# Patient Record
Sex: FEMALE | Race: WHITE | Employment: OTHER | URBAN - METROPOLITAN AREA
[De-identification: names, ages, dates, MRNs, and addresses within clinical notes are randomized per-mention and may not be internally consistent; named-entity substitution may affect disease eponyms.]

---

## 2018-10-15 ENCOUNTER — HOSPITAL ENCOUNTER (EMERGENCY)
Age: 83
Discharge: HOME | End: 2018-10-15
Payer: MEDICARE

## 2018-10-15 VITALS
WEIGHT: 140 LBS | HEART RATE: 73 BPM | DIASTOLIC BLOOD PRESSURE: 78 MMHG | OXYGEN SATURATION: 95 % | BODY MASS INDEX: 26.89 KG/M2 | RESPIRATION RATE: 16 BRPM | SYSTOLIC BLOOD PRESSURE: 173 MMHG | TEMPERATURE: 98.3 F

## 2018-10-15 DIAGNOSIS — Z48.02 ENCOUNTER FOR STAPLE REMOVAL: Primary | ICD-10-CM

## 2018-10-15 PROCEDURE — 99212 OFFICE O/P EST SF 10 MIN: CPT

## 2018-10-15 RX ORDER — ASPIRIN 81 MG/1
81 TABLET, CHEWABLE ORAL DAILY
COMMUNITY

## 2018-10-15 ASSESSMENT — PAIN SCALES - GENERAL: PAINLEVEL_OUTOF10: 0

## 2018-10-15 ASSESSMENT — PAIN DESCRIPTION - DESCRIPTORS: DESCRIPTORS: SORE

## 2018-10-15 ASSESSMENT — PAIN DESCRIPTION - LOCATION: LOCATION: HEAD

## 2018-10-15 NOTE — ED PROVIDER NOTES
HPI: Obdulio Deleon is a 80 y.o. female with a past medical history of  has no past medical history on file. presenting for reevaluation of the wound as well as suture removal. The patient sustained a laceration 8  Days ago. The wound was closed with staples.  Patient states that the wound has been healing well without evidence of erythema, purulent drainage, or increased pain. The patient denies a sensation of foreign body. The patient denies any fevers.     ROS:   Unless otherwise stated in this report or unable to obtain because of the patient's clinical or mental status as evidenced by the medical record, this patients's positive and negative responses for Review of Systems, constitutional, psych, eyes, ENT, cardiovascular, respiratory, gastrointestinal, neurological, genitourinary, musculoskeletal, integument systems and systems related to the presenting problem are either stated in the preceding or were not pertinent or were negative for the symptoms and/or complaints related to the medical problem. --------------------------------------------- PAST HISTORY ---------------------------------------------  Past Medical History:  has no past medical history on file. Past Surgical History:  has a past surgical history that includes Hysterectomy and Cholecystectomy. Social History:  reports that she has never smoked. She has never used smokeless tobacco. She reports that she does not drink alcohol. Family History: family history is not on file. The patients home medications have been reviewed. Allergies: Patient has no known allergies. ------------------------- NURSING NOTES AND VITALS REVIEWED ---------------------------   The nursing notes within the ED encounter and vital signs as below have been reviewed by myself.   BP (!) 173/78   Pulse 73   Temp 98.3 °F (36.8 °C) (Oral)   Resp 16   Wt 140 lb (63.5 kg)   SpO2 95%   BMI 26.89 kg/m²   Oxygen Saturation Interpretation: Normal    The

## 2019-02-15 LAB — MAMMOGRAPHY, EXTERNAL: NORMAL

## 2019-02-23 ENCOUNTER — APPOINTMENT (OUTPATIENT)
Dept: GENERAL RADIOLOGY | Age: 84
End: 2019-02-23
Payer: MEDICARE

## 2019-02-23 ENCOUNTER — APPOINTMENT (OUTPATIENT)
Dept: CT IMAGING | Age: 84
End: 2019-02-23
Payer: MEDICARE

## 2019-02-23 ENCOUNTER — HOSPITAL ENCOUNTER (EMERGENCY)
Age: 84
Discharge: HOME | End: 2019-02-23
Payer: MEDICARE

## 2019-02-23 VITALS
SYSTOLIC BLOOD PRESSURE: 157 MMHG | OXYGEN SATURATION: 96 % | TEMPERATURE: 97.7 F | RESPIRATION RATE: 18 BRPM | HEART RATE: 84 BPM | DIASTOLIC BLOOD PRESSURE: 84 MMHG | HEIGHT: 61 IN | WEIGHT: 140 LBS | BODY MASS INDEX: 26.43 KG/M2

## 2019-02-23 DIAGNOSIS — M25.562 ACUTE PAIN OF BOTH KNEES: ICD-10-CM

## 2019-02-23 DIAGNOSIS — S60.221A CONTUSION OF RIGHT HAND, INITIAL ENCOUNTER: ICD-10-CM

## 2019-02-23 DIAGNOSIS — M25.571 ACUTE RIGHT ANKLE PAIN: ICD-10-CM

## 2019-02-23 DIAGNOSIS — S00.83XA FACIAL CONTUSION, INITIAL ENCOUNTER: ICD-10-CM

## 2019-02-23 DIAGNOSIS — M25.561 ACUTE PAIN OF BOTH KNEES: ICD-10-CM

## 2019-02-23 DIAGNOSIS — S09.90XA INJURY OF HEAD, INITIAL ENCOUNTER: Primary | ICD-10-CM

## 2019-02-23 PROCEDURE — 73560 X-RAY EXAM OF KNEE 1 OR 2: CPT

## 2019-02-23 PROCEDURE — 73130 X-RAY EXAM OF HAND: CPT

## 2019-02-23 PROCEDURE — 73610 X-RAY EXAM OF ANKLE: CPT

## 2019-02-23 PROCEDURE — 73630 X-RAY EXAM OF FOOT: CPT

## 2019-02-23 PROCEDURE — 70486 CT MAXILLOFACIAL W/O DYE: CPT

## 2019-02-23 PROCEDURE — 73110 X-RAY EXAM OF WRIST: CPT

## 2019-02-23 PROCEDURE — 99284 EMERGENCY DEPT VISIT MOD MDM: CPT

## 2019-02-23 PROCEDURE — 70450 CT HEAD/BRAIN W/O DYE: CPT

## 2019-02-23 PROCEDURE — 72125 CT NECK SPINE W/O DYE: CPT

## 2019-02-23 ASSESSMENT — PAIN DESCRIPTION - ORIENTATION: ORIENTATION: RIGHT;LEFT

## 2019-02-23 ASSESSMENT — PAIN DESCRIPTION - PAIN TYPE: TYPE: ACUTE PAIN

## 2019-02-23 ASSESSMENT — PAIN DESCRIPTION - LOCATION: LOCATION: KNEE;LEG;HAND

## 2019-02-23 ASSESSMENT — PAIN DESCRIPTION - DESCRIPTORS: DESCRIPTORS: SPASM

## 2019-02-23 ASSESSMENT — PAIN SCALES - GENERAL: PAINLEVEL_OUTOF10: 5

## 2020-03-09 ENCOUNTER — HOSPITAL ENCOUNTER (OUTPATIENT)
Dept: MAMMOGRAPHY | Age: 85
Discharge: HOME | End: 2020-03-11
Payer: MEDICARE

## 2020-03-09 PROCEDURE — 77067 SCR MAMMO BI INCL CAD: CPT

## 2021-01-27 ENCOUNTER — IMMUNIZATION (OUTPATIENT)
Dept: PRIMARY CARE CLINIC | Age: 86
End: 2021-01-27
Payer: MEDICARE

## 2021-01-27 PROCEDURE — 0011A COVID-19, MODERNA VACCINE 100MCG/0.5ML DOSE: CPT | Performed by: NURSE PRACTITIONER

## 2021-01-27 PROCEDURE — 91301 COVID-19, MODERNA VACCINE 100MCG/0.5ML DOSE: CPT | Performed by: NURSE PRACTITIONER

## 2021-02-24 ENCOUNTER — IMMUNIZATION (OUTPATIENT)
Dept: PRIMARY CARE CLINIC | Age: 86
End: 2021-02-24
Payer: MEDICARE

## 2021-02-24 PROCEDURE — 91301 COVID-19, MODERNA VACCINE 100MCG/0.5ML DOSE: CPT | Performed by: NURSE PRACTITIONER

## 2021-02-24 PROCEDURE — 0012A COVID-19, MODERNA VACCINE 100MCG/0.5ML DOSE: CPT | Performed by: NURSE PRACTITIONER

## 2021-06-26 ENCOUNTER — HOSPITAL ENCOUNTER (EMERGENCY)
Age: 86
Discharge: ANOTHER HOSPITAL | DRG: 853 | End: 2021-06-26
Payer: MEDICARE

## 2021-06-26 ENCOUNTER — APPOINTMENT (OUTPATIENT)
Dept: CT IMAGING | Age: 86
DRG: 853 | End: 2021-06-26
Payer: MEDICARE

## 2021-06-26 ENCOUNTER — HOSPITAL ENCOUNTER (INPATIENT)
Age: 86
LOS: 6 days | Discharge: NURSING, REHAB OR LONG-TERM CARE | DRG: 853 | End: 2021-07-02
Attending: EMERGENCY MEDICINE | Admitting: SURGERY
Payer: MEDICARE

## 2021-06-26 VITALS
HEIGHT: 61 IN | DIASTOLIC BLOOD PRESSURE: 84 MMHG | OXYGEN SATURATION: 99 % | SYSTOLIC BLOOD PRESSURE: 160 MMHG | BODY MASS INDEX: 25.68 KG/M2 | HEART RATE: 84 BPM | WEIGHT: 136 LBS | TEMPERATURE: 98 F | RESPIRATION RATE: 20 BRPM

## 2021-06-26 DIAGNOSIS — K55.9 SMALL BOWEL ISCHEMIA (HCC): Primary | ICD-10-CM

## 2021-06-26 DIAGNOSIS — R10.30 LOWER ABDOMINAL PAIN: Primary | ICD-10-CM

## 2021-06-26 LAB
ALBUMIN SERPL-MCNC: 4.8 G/DL (ref 3.5–5.2)
ALP BLD-CCNC: 112 U/L (ref 35–104)
ALT SERPL-CCNC: 17 U/L (ref 0–32)
AMORPHOUS: ABNORMAL
ANION GAP SERPL CALCULATED.3IONS-SCNC: 17 MMOL/L (ref 7–16)
AST SERPL-CCNC: 22 U/L (ref 0–31)
BACTERIA: ABNORMAL /HPF
BASOPHILS ABSOLUTE: 0.04 E9/L (ref 0–0.2)
BASOPHILS RELATIVE PERCENT: 0.4 % (ref 0–2)
BILIRUB SERPL-MCNC: 0.6 MG/DL (ref 0–1.2)
BILIRUBIN URINE: NEGATIVE
BLOOD, URINE: NEGATIVE
BUN BLDV-MCNC: 11 MG/DL (ref 6–23)
CALCIUM SERPL-MCNC: 10 MG/DL (ref 8.6–10.2)
CHLORIDE BLD-SCNC: 93 MMOL/L (ref 98–107)
CLARITY: ABNORMAL
CO2: 22 MMOL/L (ref 22–29)
COLOR: YELLOW
CREAT SERPL-MCNC: 0.5 MG/DL (ref 0.5–1)
EOSINOPHILS ABSOLUTE: 0.02 E9/L (ref 0.05–0.5)
EOSINOPHILS RELATIVE PERCENT: 0.2 % (ref 0–6)
GFR AFRICAN AMERICAN: >60
GFR NON-AFRICAN AMERICAN: >60 ML/MIN/1.73
GLUCOSE BLD-MCNC: 119 MG/DL (ref 74–99)
GLUCOSE URINE: NEGATIVE MG/DL
HCT VFR BLD CALC: 40.5 % (ref 34–48)
HEMOGLOBIN: 14 G/DL (ref 11.5–15.5)
IMMATURE GRANULOCYTES #: 0.05 E9/L
IMMATURE GRANULOCYTES %: 0.5 % (ref 0–5)
KETONES, URINE: 40 MG/DL
LACTIC ACID: 1.8 MMOL/L (ref 0.5–2.2)
LEUKOCYTE ESTERASE, URINE: ABNORMAL
LIPASE: 17 U/L (ref 13–60)
LYMPHOCYTES ABSOLUTE: 1.21 E9/L (ref 1.5–4)
LYMPHOCYTES RELATIVE PERCENT: 11.5 % (ref 20–42)
MCH RBC QN AUTO: 31.2 PG (ref 26–35)
MCHC RBC AUTO-ENTMCNC: 34.6 % (ref 32–34.5)
MCV RBC AUTO: 90.2 FL (ref 80–99.9)
MONOCYTES ABSOLUTE: 0.61 E9/L (ref 0.1–0.95)
MONOCYTES RELATIVE PERCENT: 5.8 % (ref 2–12)
NEUTROPHILS ABSOLUTE: 8.63 E9/L (ref 1.8–7.3)
NEUTROPHILS RELATIVE PERCENT: 81.6 % (ref 43–80)
NITRITE, URINE: NEGATIVE
PDW BLD-RTO: 11.9 FL (ref 11.5–15)
PH UA: 8.5 (ref 5–9)
PLATELET # BLD: 261 E9/L (ref 130–450)
PMV BLD AUTO: 9.9 FL (ref 7–12)
POTASSIUM SERPL-SCNC: 3.9 MMOL/L (ref 3.5–5)
PROTEIN UA: NEGATIVE MG/DL
RBC # BLD: 4.49 E12/L (ref 3.5–5.5)
RBC UA: ABNORMAL /HPF (ref 0–2)
SODIUM BLD-SCNC: 132 MMOL/L (ref 132–146)
SPECIFIC GRAVITY UA: 1.02 (ref 1–1.03)
TOTAL PROTEIN: 7.7 G/DL (ref 6.4–8.3)
UROBILINOGEN, URINE: 0.2 E.U./DL
WBC # BLD: 10.6 E9/L (ref 4.5–11.5)
WBC UA: ABNORMAL /HPF (ref 0–5)

## 2021-06-26 PROCEDURE — 81001 URINALYSIS AUTO W/SCOPE: CPT

## 2021-06-26 PROCEDURE — 93005 ELECTROCARDIOGRAM TRACING: CPT | Performed by: EMERGENCY MEDICINE

## 2021-06-26 PROCEDURE — 99283 EMERGENCY DEPT VISIT LOW MDM: CPT

## 2021-06-26 PROCEDURE — 96374 THER/PROPH/DIAG INJ IV PUSH: CPT

## 2021-06-26 PROCEDURE — 85610 PROTHROMBIN TIME: CPT

## 2021-06-26 PROCEDURE — 86901 BLOOD TYPING SEROLOGIC RH(D): CPT

## 2021-06-26 PROCEDURE — 6360000002 HC RX W HCPCS: Performed by: EMERGENCY MEDICINE

## 2021-06-26 PROCEDURE — 2580000003 HC RX 258: Performed by: EMERGENCY MEDICINE

## 2021-06-26 PROCEDURE — 96376 TX/PRO/DX INJ SAME DRUG ADON: CPT

## 2021-06-26 PROCEDURE — 83690 ASSAY OF LIPASE: CPT

## 2021-06-26 PROCEDURE — 86850 RBC ANTIBODY SCREEN: CPT

## 2021-06-26 PROCEDURE — 36415 COLL VENOUS BLD VENIPUNCTURE: CPT

## 2021-06-26 PROCEDURE — 85025 COMPLETE CBC W/AUTO DIFF WBC: CPT

## 2021-06-26 PROCEDURE — 85730 THROMBOPLASTIN TIME PARTIAL: CPT

## 2021-06-26 PROCEDURE — 96375 TX/PRO/DX INJ NEW DRUG ADDON: CPT

## 2021-06-26 PROCEDURE — 6360000004 HC RX CONTRAST MEDICATION: Performed by: RADIOLOGY

## 2021-06-26 PROCEDURE — 2000000000 HC ICU R&B

## 2021-06-26 PROCEDURE — 80053 COMPREHEN METABOLIC PANEL: CPT

## 2021-06-26 PROCEDURE — 86900 BLOOD TYPING SEROLOGIC ABO: CPT

## 2021-06-26 PROCEDURE — 99211 OFF/OP EST MAY X REQ PHY/QHP: CPT

## 2021-06-26 PROCEDURE — 74177 CT ABD & PELVIS W/CONTRAST: CPT

## 2021-06-26 PROCEDURE — 83605 ASSAY OF LACTIC ACID: CPT

## 2021-06-26 PROCEDURE — 2580000003 HC RX 258

## 2021-06-26 RX ORDER — 0.9 % SODIUM CHLORIDE 0.9 %
500 INTRAVENOUS SOLUTION INTRAVENOUS ONCE
Status: COMPLETED | OUTPATIENT
Start: 2021-06-26 | End: 2021-06-26

## 2021-06-26 RX ORDER — FENTANYL CITRATE 50 UG/ML
50 INJECTION, SOLUTION INTRAMUSCULAR; INTRAVENOUS ONCE
Status: COMPLETED | OUTPATIENT
Start: 2021-06-26 | End: 2021-06-26

## 2021-06-26 RX ORDER — SODIUM CHLORIDE 9 MG/ML
INJECTION, SOLUTION INTRAVENOUS
Status: COMPLETED
Start: 2021-06-26 | End: 2021-06-26

## 2021-06-26 RX ORDER — 0.9 % SODIUM CHLORIDE 0.9 %
1000 INTRAVENOUS SOLUTION INTRAVENOUS ONCE
Status: COMPLETED | OUTPATIENT
Start: 2021-06-26 | End: 2021-06-27

## 2021-06-26 RX ORDER — ONDANSETRON 2 MG/ML
4 INJECTION INTRAMUSCULAR; INTRAVENOUS ONCE
Status: COMPLETED | OUTPATIENT
Start: 2021-06-26 | End: 2021-06-26

## 2021-06-26 RX ORDER — PROCHLORPERAZINE EDISYLATE 5 MG/ML
10 INJECTION INTRAMUSCULAR; INTRAVENOUS ONCE
Status: COMPLETED | OUTPATIENT
Start: 2021-06-26 | End: 2021-06-26

## 2021-06-26 RX ADMIN — Medication 500 ML: at 22:15

## 2021-06-26 RX ADMIN — IOPAMIDOL 75 ML: 755 INJECTION, SOLUTION INTRAVENOUS at 21:13

## 2021-06-26 RX ADMIN — PROCHLORPERAZINE EDISYLATE 10 MG: 5 INJECTION INTRAMUSCULAR; INTRAVENOUS at 20:14

## 2021-06-26 RX ADMIN — CEFTRIAXONE SODIUM 1000 MG: 1 INJECTION, POWDER, FOR SOLUTION INTRAMUSCULAR; INTRAVENOUS at 23:03

## 2021-06-26 RX ADMIN — SODIUM CHLORIDE 500 ML: 9 INJECTION, SOLUTION INTRAVENOUS at 22:15

## 2021-06-26 RX ADMIN — FENTANYL CITRATE 50 MCG: 50 INJECTION, SOLUTION INTRAMUSCULAR; INTRAVENOUS at 18:45

## 2021-06-26 RX ADMIN — ONDANSETRON 4 MG: 2 INJECTION INTRAMUSCULAR; INTRAVENOUS at 18:46

## 2021-06-26 RX ADMIN — FENTANYL CITRATE 50 MCG: 50 INJECTION, SOLUTION INTRAMUSCULAR; INTRAVENOUS at 22:16

## 2021-06-26 RX ADMIN — SODIUM CHLORIDE 1000 ML: 9 INJECTION, SOLUTION INTRAVENOUS at 22:55

## 2021-06-26 RX ADMIN — SODIUM CHLORIDE 500 ML: 0.9 INJECTION, SOLUTION INTRAVENOUS at 18:48

## 2021-06-26 ASSESSMENT — PAIN DESCRIPTION - PAIN TYPE
TYPE: ACUTE PAIN

## 2021-06-26 ASSESSMENT — PAIN SCALES - GENERAL
PAINLEVEL_OUTOF10: 10
PAINLEVEL_OUTOF10: 10
PAINLEVEL_OUTOF10: 6
PAINLEVEL_OUTOF10: 5

## 2021-06-26 ASSESSMENT — ENCOUNTER SYMPTOMS
SHORTNESS OF BREATH: 0
COUGH: 0
BACK PAIN: 0
ABDOMINAL PAIN: 1

## 2021-06-26 ASSESSMENT — PAIN DESCRIPTION - PROGRESSION
CLINICAL_PROGRESSION: GRADUALLY WORSENING
CLINICAL_PROGRESSION: NOT CHANGED

## 2021-06-26 ASSESSMENT — PAIN DESCRIPTION - ONSET
ONSET: ON-GOING
ONSET: ON-GOING

## 2021-06-26 ASSESSMENT — PAIN DESCRIPTION - LOCATION
LOCATION: ABDOMEN;BACK
LOCATION: ABDOMEN;BACK
LOCATION: ABDOMEN

## 2021-06-26 ASSESSMENT — PAIN - FUNCTIONAL ASSESSMENT
PAIN_FUNCTIONAL_ASSESSMENT: PREVENTS OR INTERFERES SOME ACTIVE ACTIVITIES AND ADLS
PAIN_FUNCTIONAL_ASSESSMENT: ACTIVITIES ARE NOT PREVENTED
PAIN_FUNCTIONAL_ASSESSMENT: 0-10

## 2021-06-26 ASSESSMENT — PAIN DESCRIPTION - DESCRIPTORS: DESCRIPTORS: SHARP;SHOOTING

## 2021-06-26 ASSESSMENT — PAIN DESCRIPTION - ORIENTATION: ORIENTATION: LOWER

## 2021-06-26 ASSESSMENT — PAIN DESCRIPTION - FREQUENCY
FREQUENCY: INTERMITTENT
FREQUENCY: INTERMITTENT

## 2021-06-26 NOTE — ED NOTES
FIRST PROVIDER CONTACT ASSESSMENT NOTE      Department of Emergency Medicine   Admit Date: No admission date for patient encounter. Chief Complaint: Abdominal Pain (Sent here from  for Abdominal Pain, Back Pain and Bloating.)      History of Present Illness:   Negra Aguiar is a 80 y.o. female who presents to the ED for periumbililcal abdominal pain with bloating. Pain radiates to the back.          -----------------END OF FIRST PROVIDER CONTACT ASSESSMENT NOTE--------------  Electronically signed by FRITZ Montgomery   DD: 6/26/21               Kristen Tobias [place]  06/26/21 1805

## 2021-06-26 NOTE — ED PROVIDER NOTES
[facility] [facility]  Department of Emergency Medicine   ED  Encounter Note  Admit Date/RoomTime: 2021  4:53 PM  ED Room:     NAME: Ariadna Amaya  : 1933  MRN: [id number]     Chief Complaint:  Abdominal Pain and Back Pain    History of Present Illness       Ariadna Amaya is a 80 y.o. old female who presents to the emergency department with complaint of abdominal pain. Patient states that she started with a twinge of lower abdominal pain last night. Thought it was the beans and soup that she had eaten at [employer] earlier yesterday. And then today around 11:00 the pain came on even worse. She is having sharp pains across her lower abdomen. Pain radiates around on each side into her back. Does admit that she is very nauseated. Did have some dry heaving and regurgitation, but no outright vomiting. Patient states that she had a small hard bowel movement this morning. States that she only has small hard bowel movements every day. No diarrhea. Patient denies any urinary frequency or burning with urination. She rates the pain a 6 out of 10. Nothing seems to be making it any better. Patient has had a hysterectomy and cholecystectomy. ROS   Pertinent positives and negatives are stated within HPI, all other systems reviewed and are negative. Past Medical History:  has no past medical history on file. Surgical History has a past surgical history that includes Hysterectomy and Cholecystectomy. Social History:  reports that she has never smoked. She has never used smokeless tobacco. She reports that she does not drink alcohol and does not use drugs. Family History: family history is not on file. Allergies: Nut [peanut-containing drug products] and Statins    Physical Exam   Oxygen Saturation Interpretation: Normal on room air analysis.         ED Triage Vitals   BP Temp Temp Source Pulse Resp SpO2 Height Weight   21 1656 21 1655 21 1655 21 1655 06/26/21 1655 06/26/21 1655 06/26/21 1655 06/26/21 1655   (!) 160/84 98 °F (36.7 °C) Temporal 84 20 99 % 5' 0.5\" (1.537 m) 136 lb (61.7 kg)          General:  NAD. Alert and Oriented. Well-appearing. Skin:  Warm, dry. No rashes. Head:  Normocephalic. Atraumatic. Eyes:  EOMI. Conjunctiva normal.  ENT:  Oral mucosa moist.  Airway patent. Neck:  Supple. Normal ROM. Respiratory:  No respiratory distress. No labored breathing. Lungs clear without rales, rhonchi or wheezing. Cardiovascular:  Regular rate. No peripheral edema. Extremities warm and good color. Chest:  Abdomen: Abdomen is distended. Hypoactive bowel sounds. She is tender to palpation to the bilateral lower quadrants and midline lower abdomen. Patient is having waves of pain and she will actually grab her lower abdomen and bend over. Negative guarding. Negative rebound. Rectal:  Gu: Bladder nontender and non distended. No CVA tenderness. Pelvic:  Extremities:  Normal ROM. Nontender to palpation. Atraumatic. Back:  Normal ROM. Nontender to palpation. Neuro:  Alert and Oriented to person, place, time and situation. Normal LOC. Moves all extremities. Speech fluent. Psych:  Calm and Cooperative. Normal thought process. Normal judgement.         Lab / Imaging Results   (All laboratory and radiology results have been personally reviewed by myself)  Labs:  Results for orders placed or performed during the hospital encounter of 06/26/21   Urinalysis, reflex to microscopic   Result Value Ref Range    Color, UA Yellow Straw/Yellow    Clarity, UA CLOUDY (A) Clear    Glucose, Ur Negative Negative mg/dL    Bilirubin Urine Negative Negative    Ketones, Urine 40 (A) Negative mg/dL    Specific Gravity, UA 1.020 1.005 - 1.030    Blood, Urine Negative Negative    pH, UA 8.5 5.0 - 9.0    Protein, UA Negative Negative mg/dL    Urobilinogen, Urine 0.2 <2.0 E.U./dL    Nitrite, Urine Negative Negative    Leukocyte Esterase, Urine TRACE (A) Negative     Imaging: All Radiology results interpreted by Radiologist unless otherwise noted. No orders to display     ED Course / Medical Decision Making   Medications - No data to display       Re-Evaluations:  6/26/21      Time:     Patients condition . Consultations:             None    Procedures:   none    MDM: Abdominal pain and back pain in an 54-year-old female requires more of a work-up than I can do here in urgent care. Son is at bedside and he will drive her to the ER. Plan of Care/Counseling:  Physician Assistant on duty reviewed today's visit with the patient in addition to providing specific details for the plan of care and counseling regarding the diagnosis and prognosis. Questions are answered at this time and are agreeable with the plan. Assessment      1. Lower abdominal pain New Problem     This patient's ED course included: a personal history and physicial examination  This patient has remained hemodynamically stable during their ED course. Plan   Discharged to [facility] ER  Patient condition is fair. New Medications     New Prescriptions    No medications on file     Electronically signed by FRITZ Fabian   DD: 6/26/21  **This report was transcribed using voice recognition software. Every effort was made to ensure accuracy; however, inadvertent computerized transcription errors may be present.   END OF PROVIDER NOTE       Alcira Fabian  06/26/21 1710

## 2021-06-26 NOTE — ED PROVIDER NOTES
This is an 49-year-old female with no significant past medical history presents to the ED for evaluation of abdominal pain. Patient states that last night at around 11:00 she developed gradual onset abdominal pain and bloating. Patient states that this pain wraps around to her lower back. Patient states that throughout the day today it has been gotten worse. Patient describes the pain is severe in nature. Patient states he is unable to have a small bowel movement earlier today but feels quite nauseous and has vomited several times. Patient has no chest pain or shortness of breath. Patient denies any dysuria or hematuria. Patient denies any mitigating or exacerbating factors. The history is provided by the patient. No  was used. Review of Systems   Constitutional: Positive for appetite change. HENT: Negative for congestion. Eyes: Negative for visual disturbance. Respiratory: Negative for cough and shortness of breath. Cardiovascular: Negative for chest pain. Gastrointestinal: Positive for abdominal pain. Endocrine: Negative for polyuria. Genitourinary: Negative for dysuria. Musculoskeletal: Negative for back pain. Skin: Negative for rash. Neurological: Negative for headaches. Hematological: Does not bruise/bleed easily. Psychiatric/Behavioral: Negative for confusion. Physical Exam  Vitals and nursing note reviewed. Constitutional:       General: She is not in acute distress. Appearance: She is well-developed. HENT:      Head: Normocephalic and atraumatic. Neck:      Vascular: No JVD. Cardiovascular:      Rate and Rhythm: Normal rate and regular rhythm. Pulmonary:      Effort: Pulmonary effort is normal.   Abdominal:      General: There is no distension. Palpations: Abdomen is soft. Musculoskeletal:      Cervical back: Normal range of motion. Skin:     General: Skin is warm and dry.    Neurological:      Mental Status: She is alert and oriented to person, place, and time. Cranial Nerves: No cranial nerve deficit. Psychiatric:         Behavior: Behavior normal.          Procedures     MDM  Number of Diagnoses or Management Options  Small bowel ischemia [facility])  Diagnosis management comments: Patient presented to the ED for evaluation of abdominal pain going to her back. Patient has significant abdominal tenderness as well as some distention. Patient was hemodynamically stable while I was here in the department observing the patient. Patient was treated with multiple doses of analgesics IV fluids CT imaging was concerning for small bowel ischemia. Patient was treated with antibiotics and I probably called surgery discussed the case who agreed to admit the patient. Patient and family were updated multiple times were agreeable this plan. ED Course as of Jun 27 1744   Sat Jun 26, 2021 2322 Page out to Dr. Nico Sharif    [BP]   [phone] with Dr. Nico Sharif, asked to have an NG tube placed and would admit patient. Patient agreeable with plan. [BP]      ED Course User Index  [BP] Andria Malik DO           ED Course as of Jun 27 1744   Sat Jun 26, 2021 2322 Page out to Dr. Nico Sharif    [BP]   [phone] with Dr. Nico Sharif, asked to have an NG tube placed and would admit patient. Patient agreeable with plan. [BP]      ED Course User Index  [BP] Andria Malik DO       --------------------------------------------- PAST HISTORY ---------------------------------------------  Past Medical History:  has no past medical history on file. Past Surgical History:  has a past surgical history that includes Hysterectomy and Cholecystectomy. Social History:  reports that she has never smoked. She has never used smokeless tobacco. She reports that she does not drink alcohol and does not use drugs. Family History: family history is not on file. The patients home medications have been reviewed.     Allergies: Nut [peanut-containing drug products] and Statins    -------------------------------------------------- RESULTS -------------------------------------------------    LABS:  Results for orders placed or performed during the hospital encounter of 06/26/21   CBC auto differential   Result Value Ref Range    WBC 10.6 4.5 - 11.5 E9/L    RBC 4.49 3.50 - 5.50 E12/L    Hemoglobin 14.0 11.5 - 15.5 g/dL    Hematocrit 40.5 34.0 - 48.0 %    MCV 90.2 80.0 - 99.9 fL    MCH 31.2 26.0 - 35.0 pg    MCHC 34.6 (H) 32.0 - 34.5 %    RDW 11.9 11.5 - 15.0 fL    Platelets 433 506 - 117 E9/L    MPV 9.9 7.0 - 12.0 fL    Neutrophils % 81.6 (H) 43.0 - 80.0 %    Immature Granulocytes % 0.5 0.0 - 5.0 %    Lymphocytes % 11.5 (L) 20.0 - 42.0 %    Monocytes % 5.8 2.0 - 12.0 %    Eosinophils % 0.2 0.0 - 6.0 %    Basophils % 0.4 0.0 - 2.0 %    Neutrophils Absolute 8.63 (H) 1.80 - 7.30 E9/L    Immature Granulocytes # 0.05 E9/L    Lymphocytes Absolute 1.21 (L) 1.50 - 4.00 E9/L    Monocytes Absolute 0.61 0.10 - 0.95 E9/L    Eosinophils Absolute 0.02 (L) 0.05 - 0.50 E9/L    Basophils Absolute 0.04 0.00 - 0.20 E9/L   COMPREHENSIVE METABOLIC PANEL   Result Value Ref Range    Sodium 132 132 - 146 mmol/L    Potassium 3.9 3.5 - 5.0 mmol/L    Chloride 93 (L) 98 - 107 mmol/L    CO2 22 22 - 29 mmol/L    Anion Gap 17 (H) 7 - 16 mmol/L    Glucose 119 (H) 74 - 99 mg/dL    BUN 11 6 - 23 mg/dL    CREATININE 0.5 0.5 - 1.0 mg/dL    GFR Non-African American >60 >=60 mL/min/1.73    GFR African American >60     Calcium 10.0 8.6 - 10.2 mg/dL    Total Protein 7.7 6.4 - 8.3 g/dL    Albumin 4.8 3.5 - 5.2 g/dL    Total Bilirubin 0.6 0.0 - 1.2 mg/dL    Alkaline Phosphatase 112 (H) 35 - 104 U/L    ALT 17 0 - 32 U/L    AST 22 0 - 31 U/L   Lipase   Result Value Ref Range    Lipase 17 13 - 60 U/L   LACTIC ACID, PLASMA   Result Value Ref Range    Lactic Acid 1.8 0.5 - 2.2 mmol/L   APTT   Result Value Ref Range    aPTT 20.6 (L) 24.5 - 35.1 sec   Protime-INR   Result Value Ref Range Protime 11.9 9.3 - 12.4 sec    INR 1.0    Lactic acid, plasma   Result Value Ref Range    Lactic Acid 7.2 (HH) 0.5 - 2.2 mmol/L   Basic Metabolic Panel w/ Reflex to MG   Result Value Ref Range    Sodium 132 132 - 146 mmol/L    Potassium reflex Magnesium 3.9 3.5 - 5.0 mmol/L    Chloride 100 98 - 107 mmol/L    CO2 15 (L) 22 - 29 mmol/L    Anion Gap 17 (H) 7 - 16 mmol/L    Glucose 249 (H) 74 - 99 mg/dL    BUN 20 6 - 23 mg/dL    CREATININE 1.2 (H) 0.5 - 1.0 mg/dL    GFR Non-African American 42 >=60 mL/min/1.73    GFR African American 51     Calcium 9.1 8.6 - 10.2 mg/dL   CBC auto differential   Result Value Ref Range    WBC 18.5 (H) 4.5 - 11.5 E9/L    RBC 5.77 (H) 3.50 - 5.50 E12/L    Hemoglobin 17.9 (H) 11.5 - 15.5 g/dL    Hematocrit 55.6 (H) 34.0 - 48.0 %    MCV 96.4 80.0 - 99.9 fL    MCH 31.0 26.0 - 35.0 pg    MCHC 32.2 32.0 - 34.5 %    RDW 12.6 11.5 - 15.0 fL    Platelets 882 324 - 709 E9/L    MPV 10.2 7.0 - 12.0 fL    Neutrophils % 87.4 (H) 43.0 - 80.0 %    Immature Granulocytes % 0.7 0.0 - 5.0 %    Lymphocytes % 5.1 (L) 20.0 - 42.0 %    Monocytes % 6.6 2.0 - 12.0 %    Eosinophils % 0.0 0.0 - 6.0 %    Basophils % 0.2 0.0 - 2.0 %    Neutrophils Absolute 16.14 (H) 1.80 - 7.30 E9/L    Immature Granulocytes # 0.12 E9/L    Lymphocytes Absolute 0.95 (L) 1.50 - 4.00 E9/L    Monocytes Absolute 1.22 (H) 0.10 - 0.95 E9/L    Eosinophils Absolute 0.00 (L) 0.05 - 0.50 E9/L    Basophils Absolute 0.03 0.00 - 0.20 E9/L   CBC WITH AUTO DIFFERENTIAL   Result Value Ref Range    WBC 15.1 (H) 4.5 - 11.5 E9/L    RBC 4.73 3.50 - 5.50 E12/L    Hemoglobin 15.0 11.5 - 15.5 g/dL    Hematocrit 43.4 34.0 - 48.0 %    MCV 91.8 80.0 - 99.9 fL    MCH 31.7 26.0 - 35.0 pg    MCHC 34.6 (H) 32.0 - 34.5 %    RDW 12.5 11.5 - 15.0 fL    Platelets 586 499 - 632 E9/L    MPV 10.2 7.0 - 12.0 fL    Neutrophils % 86.6 (H) 43.0 - 80.0 %    Immature Granulocytes % 0.5 0.0 - 5.0 %    Lymphocytes % 6.3 (L) 20.0 - 42.0 %    Monocytes % 6.5 2.0 - 12.0 % Eosinophils % 0.0 0.0 - 6.0 %    Basophils % 0.1 0.0 - 2.0 %    Neutrophils Absolute 13.03 (H) 1.80 - 7.30 E9/L    Immature Granulocytes # 0.07 E9/L    Lymphocytes Absolute 0.95 (L) 1.50 - 4.00 E9/L    Monocytes Absolute 0.98 (H) 0.10 - 0.95 E9/L    Eosinophils Absolute 0.00 (L) 0.05 - 0.50 E9/L    Basophils Absolute 0.02 0.00 - 0.20 E9/L   Lactic acid, plasma   Result Value Ref Range    Lactic Acid 3.4 (H) 0.5 - 2.2 mmol/L   Comprehensive Metabolic Panel w/ Reflex to MG   Result Value Ref Range    Sodium 133 132 - 146 mmol/L    Potassium reflex Magnesium 3.9 3.5 - 5.0 mmol/L    Chloride 105 98 - 107 mmol/L    CO2 16 (L) 22 - 29 mmol/L    Anion Gap 12 7 - 16 mmol/L    Glucose 170 (H) 74 - 99 mg/dL    BUN 22 6 - 23 mg/dL    CREATININE 0.9 0.5 - 1.0 mg/dL    GFR Non-African American 59 >=60 mL/min/1.73    GFR African American >60     Calcium 8.3 (L) 8.6 - 10.2 mg/dL    Total Protein 4.0 (L) 6.4 - 8.3 g/dL    Albumin 2.5 (L) 3.5 - 5.2 g/dL    Total Bilirubin 0.3 0.0 - 1.2 mg/dL    Alkaline Phosphatase 74 35 - 104 U/L    ALT 10 0 - 32 U/L    AST 14 0 - 31 U/L   Lactic acid, plasma   Result Value Ref Range    Lactic Acid 2.1 0.5 - 2.2 mmol/L   EKG 12 Lead   Result Value Ref Range    Ventricular Rate 97 BPM    Atrial Rate 97 BPM    P-R Interval 128 ms    QRS Duration 82 ms    Q-T Interval 364 ms    QTc Calculation (Bazett) 462 ms    P Axis 55 degrees    R Axis -27 degrees    T Axis 44 degrees   TYPE AND SCREEN   Result Value Ref Range    ABO/Rh A POS     Antibody Screen NEG        RADIOLOGY:  XR CHEST PORTABLE   Final Result   Endotracheal tube in good position. Left subclavian central venous catheter tip in the SVC. NG tube in the stomach. No pneumothorax on the right on the left. No acute cardiopulmonary process. XR ABDOMEN FOR NG/OG/NE TUBE PLACEMENT   Final Result   1. Satisfactory position of the NG tube within stomach.          CT ABDOMEN PELVIS W IV CONTRAST Additional Contrast? None Final Result   Findings for segmental small bowel ischemia of entrapped mid jejunal segment   likely due internal herniation/adhesions, forming a closed loop   syndrome-likely with vascular compromise including severe mesenteric edema   and thickening of bowel wall. Preliminary report given to Dr. Keyanna Vicente physician in [facility] at the time of the interpretation.                 ------------------------- NURSING NOTES [address] ---------------------------  Date / Time Roomed:  6/26/2021  6:05 PM  ED Bed Assignment:  [id number]    The nursing notes within the ED encounter and vital signs as below have been reviewed.      Patient Vitals for the past 24 hrs:   BP Temp Temp src Pulse Resp SpO2   06/27/21 1600 110/66 98.3 °F (36.8 °C) Axillary 101 17 94 %   06/27/21 1500 110/61 -- -- 97 24 97 %   06/27/21 1350 -- 98.2 °F (36.8 °C) Axillary 94 18 100 %   06/27/21 1328 114/68 -- -- 93 20 93 %   06/27/21 1318 (!) 113/58 -- -- 81 17 100 %   06/27/21 1308 103/71 -- -- 87 15 100 %   06/27/21 1259 (!) 115/56 -- -- 86 22 100 %   06/27/21 1248 102/73 -- -- 87 19 100 %   06/27/21 1238 (!) 100/57 -- -- 80 15 100 %   06/27/21 1228 97/71 -- -- 85 12 100 %   06/27/21 1225 -- -- -- -- 22 --   06/27/21 1224 -- -- -- -- 19 --   06/27/21 1223 -- -- -- -- 21 --   06/27/21 1222 -- -- -- -- 22 --   06/27/21 1221 -- -- -- -- 16 --   06/27/21 1220 -- -- -- -- 17 --   06/27/21 1219 -- -- -- -- 19 --   06/27/21 1218 100/70 -- -- 84 16 100 %   06/27/21 1208 (!) 109/51 -- -- 78 14 100 %   06/27/21 1204 -- -- -- 87 15 100 %   06/27/21 1203 -- -- -- 87 15 100 %   06/27/21 1202 -- -- -- 81 17 100 %   06/27/21 1201 -- -- -- 87 16 100 %   06/27/21 1200 -- -- -- 89 17 100 %   06/27/21 1159 -- -- -- 89 14 100 %   06/27/21 1158 (!) 85/49 -- -- 79 13 100 %   06/27/21 1157 -- -- -- 78 20 100 %   06/27/21 1156 -- -- -- 87 20 100 %   06/27/21 1155 -- -- -- 80 19 100 %   06/27/21 1154 -- -- -- 80 22 100 %   06/27/21 1153 -- -- -- 85 22 100 %   06/27/21 1152 -- -- -- 83 23 100 %   06/27/21 1151 -- -- -- 83 27 100 %   06/27/21 1150 -- -- -- 80 12 100 %   06/27/21 1149 -- -- -- 85 18 100 %   06/27/21 1148 (!) 93/51 -- -- 80 18 100 %   06/27/21 1147 -- -- -- 79 23 100 %   06/27/21 1146 -- -- -- 88 23 100 %   06/27/21 1145 -- -- -- 91 22 100 %   06/27/21 1138 84/60 -- -- -- 26 100 %   06/27/21 1137 -- -- -- -- 20 --   06/27/21 1136 -- -- -- -- 17 --   06/27/21 1135 -- -- -- -- (!) 31 --   06/27/21 1134 -- -- -- -- 18 --   06/27/21 1133 -- -- -- -- 23 --   06/27/21 1132 -- -- -- -- 17 --   06/27/21 1131 -- -- -- -- 20 --   06/27/21 1130 -- -- -- -- 9 --   06/27/21 1128 (!) 70/58 -- -- 96 11 100 %   06/27/21 1126 -- -- -- 96 16 100 %   06/27/21 1125 -- -- -- 85 17 100 %   06/27/21 1124 -- -- -- 82 18 100 %   06/27/21 1123 -- -- -- 87 20 100 %   06/27/21 1122 -- -- -- 83 18 100 %   06/27/21 1121 -- -- -- 90 17 100 %   06/27/21 1120 -- -- -- 94 22 100 %   06/27/21 1119 -- -- -- 79 14 100 %   06/27/21 1118 (!) 97/51 -- -- 87 15 100 %   06/27/21 1117 -- -- -- 88 15 100 %   06/27/21 1116 -- -- -- 87 15 100 %   06/27/21 1115 -- -- -- 91 12 100 %   06/27/21 1114 -- -- -- 87 15 100 %   06/27/21 1104 101/65 97.2 °F (36.2 °C) Temporal 91 16 100 %   06/27/21 0850 123/69 -- -- 104 20 95 %   06/27/21 0816 117/62 -- -- 113 22 95 %   06/27/21 0809 93/66 -- -- 95 23 94 %   06/27/21 0759 87/62 -- -- 160 25 --   06/27/21 0156 116/67 97.3 °F (36.3 °C) -- 92 18 98 %       Oxygen Saturation Interpretation: Normal    ------------------------------------------ PROGRESS NOTES ------------------------------------------  Re-evaluation(s):  Time: 9020  Patients symptoms are improving  Repeat physical examination is not changed    Counseling:  I have spoken with the patient and discussed todays results, in addition to providing specific details for the plan of care and counseling regarding the diagnosis and prognosis.   Their questions are answered at this time and they are agreeable with the plan of admission. Please note that the withdrawal or failure to initiate urgent interventions for this patient would likely result in a life threatening deterioration or permanent disability. Systems at risk for deterioration include: Cv/Pulm/GI    Accordingly this patient received 32 minutes of critical care time, excluding separately billable procedures. --------------------------------- ADDITIONAL PROVIDER NOTES ---------------------------------  Consultations:   Spoke with Dr. Eben Koo  Discussed case. They will admit the patient. This patient's ED course included: a personal history and physicial examination, re-evaluation prior to disposition, multiple bedside re-evaluations, IV medications, cardiac monitoring, continuous pulse oximetry and complex medical decision making and emergency management    This patient has remained hemodynamically stable during their ED course. Diagnosis:  1. Small bowel ischemia (HCC)        Disposition:  Patient's disposition: Admit to telemetry  Patient's condition is stable.        Saad Bruno DO  06/27/21 1745

## 2021-06-27 ENCOUNTER — ANESTHESIA EVENT (OUTPATIENT)
Dept: OPERATING ROOM | Age: 86
DRG: 853 | End: 2021-06-27
Payer: MEDICARE

## 2021-06-27 ENCOUNTER — APPOINTMENT (OUTPATIENT)
Dept: GENERAL RADIOLOGY | Age: 86
DRG: 853 | End: 2021-06-27
Payer: MEDICARE

## 2021-06-27 ENCOUNTER — ANESTHESIA (OUTPATIENT)
Dept: OPERATING ROOM | Age: 86
DRG: 853 | End: 2021-06-27
Payer: MEDICARE

## 2021-06-27 VITALS
DIASTOLIC BLOOD PRESSURE: 67 MMHG | SYSTOLIC BLOOD PRESSURE: 117 MMHG | OXYGEN SATURATION: 100 % | RESPIRATION RATE: 10 BRPM

## 2021-06-27 LAB
ABO/RH: NORMAL
ALBUMIN SERPL-MCNC: 2.5 G/DL (ref 3.5–5.2)
ALP BLD-CCNC: 74 U/L (ref 35–104)
ALT SERPL-CCNC: 10 U/L (ref 0–32)
ANION GAP SERPL CALCULATED.3IONS-SCNC: 12 MMOL/L (ref 7–16)
ANION GAP SERPL CALCULATED.3IONS-SCNC: 17 MMOL/L (ref 7–16)
ANTIBODY SCREEN: NORMAL
APTT: 20.6 SEC (ref 24.5–35.1)
AST SERPL-CCNC: 14 U/L (ref 0–31)
BASOPHILS ABSOLUTE: 0.02 E9/L (ref 0–0.2)
BASOPHILS ABSOLUTE: 0.03 E9/L (ref 0–0.2)
BASOPHILS RELATIVE PERCENT: 0.1 % (ref 0–2)
BASOPHILS RELATIVE PERCENT: 0.2 % (ref 0–2)
BILIRUB SERPL-MCNC: 0.3 MG/DL (ref 0–1.2)
BUN BLDV-MCNC: 20 MG/DL (ref 6–23)
BUN BLDV-MCNC: 22 MG/DL (ref 6–23)
CALCIUM SERPL-MCNC: 8.3 MG/DL (ref 8.6–10.2)
CALCIUM SERPL-MCNC: 9.1 MG/DL (ref 8.6–10.2)
CHLORIDE BLD-SCNC: 100 MMOL/L (ref 98–107)
CHLORIDE BLD-SCNC: 105 MMOL/L (ref 98–107)
CO2: 15 MMOL/L (ref 22–29)
CO2: 16 MMOL/L (ref 22–29)
CREAT SERPL-MCNC: 0.9 MG/DL (ref 0.5–1)
CREAT SERPL-MCNC: 1.2 MG/DL (ref 0.5–1)
EKG ATRIAL RATE: 97 BPM
EKG P AXIS: 55 DEGREES
EKG P-R INTERVAL: 128 MS
EKG Q-T INTERVAL: 364 MS
EKG QRS DURATION: 82 MS
EKG QTC CALCULATION (BAZETT): 462 MS
EKG R AXIS: -27 DEGREES
EKG T AXIS: 44 DEGREES
EKG VENTRICULAR RATE: 97 BPM
EOSINOPHILS ABSOLUTE: 0 E9/L (ref 0.05–0.5)
EOSINOPHILS ABSOLUTE: 0 E9/L (ref 0.05–0.5)
EOSINOPHILS RELATIVE PERCENT: 0 % (ref 0–6)
EOSINOPHILS RELATIVE PERCENT: 0 % (ref 0–6)
GFR AFRICAN AMERICAN: 51
GFR AFRICAN AMERICAN: >60
GFR NON-AFRICAN AMERICAN: 42 ML/MIN/1.73
GFR NON-AFRICAN AMERICAN: 59 ML/MIN/1.73
GLUCOSE BLD-MCNC: 170 MG/DL (ref 74–99)
GLUCOSE BLD-MCNC: 249 MG/DL (ref 74–99)
HCT VFR BLD CALC: 43.4 % (ref 34–48)
HCT VFR BLD CALC: 55.6 % (ref 34–48)
HEMOGLOBIN: 15 G/DL (ref 11.5–15.5)
HEMOGLOBIN: 17.9 G/DL (ref 11.5–15.5)
IMMATURE GRANULOCYTES #: 0.07 E9/L
IMMATURE GRANULOCYTES #: 0.12 E9/L
IMMATURE GRANULOCYTES %: 0.5 % (ref 0–5)
IMMATURE GRANULOCYTES %: 0.7 % (ref 0–5)
INR BLD: 1
LACTIC ACID: 1.4 MMOL/L (ref 0.5–2.2)
LACTIC ACID: 2.1 MMOL/L (ref 0.5–2.2)
LACTIC ACID: 3.4 MMOL/L (ref 0.5–2.2)
LACTIC ACID: 7.2 MMOL/L (ref 0.5–2.2)
LYMPHOCYTES ABSOLUTE: 0.95 E9/L (ref 1.5–4)
LYMPHOCYTES ABSOLUTE: 0.95 E9/L (ref 1.5–4)
LYMPHOCYTES RELATIVE PERCENT: 5.1 % (ref 20–42)
LYMPHOCYTES RELATIVE PERCENT: 6.3 % (ref 20–42)
MCH RBC QN AUTO: 31 PG (ref 26–35)
MCH RBC QN AUTO: 31.7 PG (ref 26–35)
MCHC RBC AUTO-ENTMCNC: 32.2 % (ref 32–34.5)
MCHC RBC AUTO-ENTMCNC: 34.6 % (ref 32–34.5)
MCV RBC AUTO: 91.8 FL (ref 80–99.9)
MCV RBC AUTO: 96.4 FL (ref 80–99.9)
MONOCYTES ABSOLUTE: 0.98 E9/L (ref 0.1–0.95)
MONOCYTES ABSOLUTE: 1.22 E9/L (ref 0.1–0.95)
MONOCYTES RELATIVE PERCENT: 6.5 % (ref 2–12)
MONOCYTES RELATIVE PERCENT: 6.6 % (ref 2–12)
NEUTROPHILS ABSOLUTE: 13.03 E9/L (ref 1.8–7.3)
NEUTROPHILS ABSOLUTE: 16.14 E9/L (ref 1.8–7.3)
NEUTROPHILS RELATIVE PERCENT: 86.6 % (ref 43–80)
NEUTROPHILS RELATIVE PERCENT: 87.4 % (ref 43–80)
PDW BLD-RTO: 12.5 FL (ref 11.5–15)
PDW BLD-RTO: 12.6 FL (ref 11.5–15)
PLATELET # BLD: 227 E9/L (ref 130–450)
PLATELET # BLD: 320 E9/L (ref 130–450)
PMV BLD AUTO: 10.2 FL (ref 7–12)
PMV BLD AUTO: 10.2 FL (ref 7–12)
POTASSIUM REFLEX MAGNESIUM: 3.9 MMOL/L (ref 3.5–5)
POTASSIUM REFLEX MAGNESIUM: 3.9 MMOL/L (ref 3.5–5)
PROTHROMBIN TIME: 11.9 SEC (ref 9.3–12.4)
RBC # BLD: 4.73 E12/L (ref 3.5–5.5)
RBC # BLD: 5.77 E12/L (ref 3.5–5.5)
SODIUM BLD-SCNC: 132 MMOL/L (ref 132–146)
SODIUM BLD-SCNC: 133 MMOL/L (ref 132–146)
TOTAL PROTEIN: 4 G/DL (ref 6.4–8.3)
WBC # BLD: 15.1 E9/L (ref 4.5–11.5)
WBC # BLD: 18.5 E9/L (ref 4.5–11.5)

## 2021-06-27 PROCEDURE — 02HV33Z INSERTION OF INFUSION DEVICE INTO SUPERIOR VENA CAVA, PERCUTANEOUS APPROACH: ICD-10-PCS | Performed by: SURGERY

## 2021-06-27 PROCEDURE — C1751 CATH, INF, PER/CENT/MIDLINE: HCPCS | Performed by: SURGERY

## 2021-06-27 PROCEDURE — 2000000000 HC ICU R&B

## 2021-06-27 PROCEDURE — 7100000001 HC PACU RECOVERY - ADDTL 15 MIN: Performed by: SURGERY

## 2021-06-27 PROCEDURE — 44120 REMOVAL OF SMALL INTESTINE: CPT | Performed by: SURGERY

## 2021-06-27 PROCEDURE — 2580000003 HC RX 258: Performed by: NURSE ANESTHETIST, CERTIFIED REGISTERED

## 2021-06-27 PROCEDURE — 36415 COLL VENOUS BLD VENIPUNCTURE: CPT

## 2021-06-27 PROCEDURE — 74018 RADEX ABDOMEN 1 VIEW: CPT

## 2021-06-27 PROCEDURE — 99222 1ST HOSP IP/OBS MODERATE 55: CPT | Performed by: SURGERY

## 2021-06-27 PROCEDURE — 85025 COMPLETE CBC W/AUTO DIFF WBC: CPT

## 2021-06-27 PROCEDURE — 2500000003 HC RX 250 WO HCPCS: Performed by: EMERGENCY MEDICINE

## 2021-06-27 PROCEDURE — 2709999900 HC NON-CHARGEABLE SUPPLY: Performed by: SURGERY

## 2021-06-27 PROCEDURE — 83605 ASSAY OF LACTIC ACID: CPT

## 2021-06-27 PROCEDURE — 2580000003 HC RX 258: Performed by: SURGERY

## 2021-06-27 PROCEDURE — 6360000002 HC RX W HCPCS: Performed by: SURGERY

## 2021-06-27 PROCEDURE — 0DB80ZZ EXCISION OF SMALL INTESTINE, OPEN APPROACH: ICD-10-PCS | Performed by: SURGERY

## 2021-06-27 PROCEDURE — 7100000000 HC PACU RECOVERY - FIRST 15 MIN: Performed by: SURGERY

## 2021-06-27 PROCEDURE — 2500000003 HC RX 250 WO HCPCS: Performed by: SURGERY

## 2021-06-27 PROCEDURE — 6360000002 HC RX W HCPCS: Performed by: NURSE ANESTHETIST, CERTIFIED REGISTERED

## 2021-06-27 PROCEDURE — 3700000001 HC ADD 15 MINUTES (ANESTHESIA): Performed by: SURGERY

## 2021-06-27 PROCEDURE — 80053 COMPREHEN METABOLIC PANEL: CPT

## 2021-06-27 PROCEDURE — 93010 ELECTROCARDIOGRAM REPORT: CPT | Performed by: INTERNAL MEDICINE

## 2021-06-27 PROCEDURE — 2500000003 HC RX 250 WO HCPCS: Performed by: NURSE ANESTHETIST, CERTIFIED REGISTERED

## 2021-06-27 PROCEDURE — 6360000002 HC RX W HCPCS: Performed by: ANESTHESIOLOGY

## 2021-06-27 PROCEDURE — C9113 INJ PANTOPRAZOLE SODIUM, VIA: HCPCS | Performed by: SURGERY

## 2021-06-27 PROCEDURE — 3700000000 HC ANESTHESIA ATTENDED CARE: Performed by: SURGERY

## 2021-06-27 PROCEDURE — 88307 TISSUE EXAM BY PATHOLOGIST: CPT

## 2021-06-27 PROCEDURE — 36592 COLLECT BLOOD FROM PICC: CPT

## 2021-06-27 PROCEDURE — 2580000003 HC RX 258

## 2021-06-27 PROCEDURE — 37799 UNLISTED PX VASCULAR SURGERY: CPT

## 2021-06-27 PROCEDURE — 3600000004 HC SURGERY LEVEL 4 BASE: Performed by: SURGERY

## 2021-06-27 PROCEDURE — 3600000014 HC SURGERY LEVEL 4 ADDTL 15MIN: Performed by: SURGERY

## 2021-06-27 PROCEDURE — 2720000010 HC SURG SUPPLY STERILE: Performed by: SURGERY

## 2021-06-27 PROCEDURE — 6360000002 HC RX W HCPCS: Performed by: INTERNAL MEDICINE

## 2021-06-27 PROCEDURE — 71045 X-RAY EXAM CHEST 1 VIEW: CPT

## 2021-06-27 PROCEDURE — 80048 BASIC METABOLIC PNL TOTAL CA: CPT

## 2021-06-27 RX ORDER — LABETALOL HYDROCHLORIDE 5 MG/ML
5 INJECTION, SOLUTION INTRAVENOUS EVERY 10 MIN PRN
Status: DISCONTINUED | OUTPATIENT
Start: 2021-06-27 | End: 2021-06-27

## 2021-06-27 RX ORDER — PROMETHAZINE HYDROCHLORIDE 25 MG/ML
25 INJECTION, SOLUTION INTRAMUSCULAR; INTRAVENOUS PRN
Status: DISCONTINUED | OUTPATIENT
Start: 2021-06-27 | End: 2021-06-27

## 2021-06-27 RX ORDER — PANTOPRAZOLE SODIUM 40 MG/10ML
40 INJECTION, POWDER, LYOPHILIZED, FOR SOLUTION INTRAVENOUS DAILY
Status: DISCONTINUED | OUTPATIENT
Start: 2021-06-27 | End: 2021-07-01

## 2021-06-27 RX ORDER — FLUCONAZOLE 2 MG/ML
200 INJECTION, SOLUTION INTRAVENOUS EVERY 24 HOURS
Status: DISCONTINUED | OUTPATIENT
Start: 2021-06-27 | End: 2021-07-01

## 2021-06-27 RX ORDER — SODIUM CHLORIDE 9 MG/ML
25 INJECTION, SOLUTION INTRAVENOUS EVERY 8 HOURS
Status: DISCONTINUED | OUTPATIENT
Start: 2021-06-27 | End: 2021-06-29

## 2021-06-27 RX ORDER — KETOROLAC TROMETHAMINE 15 MG/ML
15 INJECTION, SOLUTION INTRAMUSCULAR; INTRAVENOUS EVERY 6 HOURS PRN
Status: DISCONTINUED | OUTPATIENT
Start: 2021-06-27 | End: 2021-06-27

## 2021-06-27 RX ORDER — SUCCINYLCHOLINE/SOD CL,ISO/PF 200MG/10ML
SYRINGE (ML) INTRAVENOUS PRN
Status: DISCONTINUED | OUTPATIENT
Start: 2021-06-27 | End: 2021-06-27 | Stop reason: SDUPTHER

## 2021-06-27 RX ORDER — SODIUM CHLORIDE, SODIUM LACTATE, POTASSIUM CHLORIDE, CALCIUM CHLORIDE 600; 310; 30; 20 MG/100ML; MG/100ML; MG/100ML; MG/100ML
INJECTION, SOLUTION INTRAVENOUS CONTINUOUS PRN
Status: DISCONTINUED | OUTPATIENT
Start: 2021-06-27 | End: 2021-06-27 | Stop reason: SDUPTHER

## 2021-06-27 RX ORDER — SODIUM CHLORIDE, SODIUM LACTATE, POTASSIUM CHLORIDE, CALCIUM CHLORIDE 600; 310; 30; 20 MG/100ML; MG/100ML; MG/100ML; MG/100ML
INJECTION, SOLUTION INTRAVENOUS CONTINUOUS
Status: DISCONTINUED | OUTPATIENT
Start: 2021-06-27 | End: 2021-06-30

## 2021-06-27 RX ORDER — PROPOFOL 10 MG/ML
INJECTION, EMULSION INTRAVENOUS PRN
Status: DISCONTINUED | OUTPATIENT
Start: 2021-06-27 | End: 2021-06-27 | Stop reason: SDUPTHER

## 2021-06-27 RX ORDER — SODIUM CHLORIDE 0.9 % (FLUSH) 0.9 %
10 SYRINGE (ML) INJECTION PRN
Status: DISCONTINUED | OUTPATIENT
Start: 2021-06-27 | End: 2021-07-02 | Stop reason: HOSPADM

## 2021-06-27 RX ORDER — BUPIVACAINE HYDROCHLORIDE AND EPINEPHRINE 2.5; 5 MG/ML; UG/ML
INJECTION, SOLUTION EPIDURAL; INFILTRATION; INTRACAUDAL; PERINEURAL PRN
Status: DISCONTINUED | OUTPATIENT
Start: 2021-06-27 | End: 2021-06-27 | Stop reason: ALTCHOICE

## 2021-06-27 RX ORDER — LIDOCAINE HYDROCHLORIDE 20 MG/ML
INJECTION, SOLUTION INTRAVENOUS PRN
Status: DISCONTINUED | OUTPATIENT
Start: 2021-06-27 | End: 2021-06-27 | Stop reason: SDUPTHER

## 2021-06-27 RX ORDER — PHENYLEPHRINE HYDROCHLORIDE 10 MG/ML
INJECTION INTRAVENOUS PRN
Status: DISCONTINUED | OUTPATIENT
Start: 2021-06-27 | End: 2021-06-27 | Stop reason: SDUPTHER

## 2021-06-27 RX ORDER — ONDANSETRON 2 MG/ML
4 INJECTION INTRAMUSCULAR; INTRAVENOUS EVERY 6 HOURS PRN
Status: DISCONTINUED | OUTPATIENT
Start: 2021-06-27 | End: 2021-06-30

## 2021-06-27 RX ORDER — FENTANYL CITRATE 50 UG/ML
INJECTION, SOLUTION INTRAMUSCULAR; INTRAVENOUS PRN
Status: DISCONTINUED | OUTPATIENT
Start: 2021-06-27 | End: 2021-06-27 | Stop reason: SDUPTHER

## 2021-06-27 RX ORDER — PROPOFOL 10 MG/ML
INJECTION, EMULSION INTRAVENOUS
Status: DISPENSED
Start: 2021-06-27 | End: 2021-06-27

## 2021-06-27 RX ORDER — MORPHINE SULFATE 4 MG/ML
4 INJECTION, SOLUTION INTRAMUSCULAR; INTRAVENOUS EVERY 4 HOURS PRN
Status: DISCONTINUED | OUTPATIENT
Start: 2021-06-27 | End: 2021-06-30

## 2021-06-27 RX ORDER — ONDANSETRON 4 MG/1
4 TABLET, ORALLY DISINTEGRATING ORAL EVERY 8 HOURS PRN
Status: DISCONTINUED | OUTPATIENT
Start: 2021-06-27 | End: 2021-06-30

## 2021-06-27 RX ORDER — SODIUM CHLORIDE 9 MG/ML
25 INJECTION, SOLUTION INTRAVENOUS PRN
Status: DISCONTINUED | OUTPATIENT
Start: 2021-06-27 | End: 2021-07-02 | Stop reason: HOSPADM

## 2021-06-27 RX ORDER — MEPERIDINE HYDROCHLORIDE 25 MG/ML
12.5 INJECTION INTRAMUSCULAR; INTRAVENOUS; SUBCUTANEOUS EVERY 5 MIN PRN
Status: DISCONTINUED | OUTPATIENT
Start: 2021-06-27 | End: 2021-06-27

## 2021-06-27 RX ORDER — SODIUM CHLORIDE, SODIUM LACTATE, POTASSIUM CHLORIDE, CALCIUM CHLORIDE 600; 310; 30; 20 MG/100ML; MG/100ML; MG/100ML; MG/100ML
INJECTION, SOLUTION INTRAVENOUS
Status: COMPLETED
Start: 2021-06-27 | End: 2021-06-27

## 2021-06-27 RX ORDER — MORPHINE SULFATE 2 MG/ML
2 INJECTION, SOLUTION INTRAMUSCULAR; INTRAVENOUS EVERY 4 HOURS PRN
Status: DISCONTINUED | OUTPATIENT
Start: 2021-06-27 | End: 2021-06-30

## 2021-06-27 RX ORDER — PROPOFOL 10 MG/ML
5-50 INJECTION, EMULSION INTRAVENOUS
Status: DISCONTINUED | OUTPATIENT
Start: 2021-06-27 | End: 2021-06-28

## 2021-06-27 RX ORDER — SODIUM CHLORIDE 9 MG/ML
10 INJECTION INTRAVENOUS DAILY
Status: DISCONTINUED | OUTPATIENT
Start: 2021-06-27 | End: 2021-07-02 | Stop reason: HOSPADM

## 2021-06-27 RX ORDER — SODIUM CHLORIDE 0.9 % (FLUSH) 0.9 %
10 SYRINGE (ML) INJECTION EVERY 12 HOURS SCHEDULED
Status: DISCONTINUED | OUTPATIENT
Start: 2021-06-27 | End: 2021-07-02 | Stop reason: HOSPADM

## 2021-06-27 RX ORDER — ROCURONIUM BROMIDE 10 MG/ML
INJECTION, SOLUTION INTRAVENOUS PRN
Status: DISCONTINUED | OUTPATIENT
Start: 2021-06-27 | End: 2021-06-27 | Stop reason: SDUPTHER

## 2021-06-27 RX ADMIN — Medication 100 MG: at 09:41

## 2021-06-27 RX ADMIN — PROPOFOL 80 MG: 10 INJECTION, EMULSION INTRAVENOUS at 09:40

## 2021-06-27 RX ADMIN — Medication 10 ML: at 21:00

## 2021-06-27 RX ADMIN — PHENYLEPHRINE HYDROCHLORIDE 40 MCG/MIN: 10 INJECTION INTRAVENOUS at 09:48

## 2021-06-27 RX ADMIN — MORPHINE SULFATE 4 MG: 4 INJECTION, SOLUTION INTRAMUSCULAR; INTRAVENOUS at 20:17

## 2021-06-27 RX ADMIN — PHENYLEPHRINE HYDROCHLORIDE 300 MCG: 10 INJECTION INTRAVENOUS at 09:45

## 2021-06-27 RX ADMIN — PANTOPRAZOLE SODIUM 40 MG: 40 INJECTION, POWDER, FOR SOLUTION INTRAVENOUS at 08:40

## 2021-06-27 RX ADMIN — PIPERACILLIN SODIUM AND TAZOBACTAM SODIUM 3375 MG: 3; .375 INJECTION, POWDER, LYOPHILIZED, FOR SOLUTION INTRAVENOUS at 08:41

## 2021-06-27 RX ADMIN — PROPOFOL 10 MCG/KG/MIN: 10 INJECTION, EMULSION INTRAVENOUS at 11:24

## 2021-06-27 RX ADMIN — PIPERACILLIN SODIUM AND TAZOBACTAM SODIUM 3375 MG: 3; .375 INJECTION, POWDER, LYOPHILIZED, FOR SOLUTION INTRAVENOUS at 17:20

## 2021-06-27 RX ADMIN — LIDOCAINE HYDROCHLORIDE 100 MG: 20 INJECTION, SOLUTION INTRAVENOUS at 09:40

## 2021-06-27 RX ADMIN — SODIUM CHLORIDE, POTASSIUM CHLORIDE, SODIUM LACTATE AND CALCIUM CHLORIDE: 600; 310; 30; 20 INJECTION, SOLUTION INTRAVENOUS at 07:06

## 2021-06-27 RX ADMIN — SODIUM CHLORIDE 25 ML: 9 INJECTION, SOLUTION INTRAVENOUS at 21:20

## 2021-06-27 RX ADMIN — SODIUM CHLORIDE, POTASSIUM CHLORIDE, SODIUM LACTATE AND CALCIUM CHLORIDE: 600; 310; 30; 20 INJECTION, SOLUTION INTRAVENOUS at 22:28

## 2021-06-27 RX ADMIN — SODIUM CHLORIDE, POTASSIUM CHLORIDE, SODIUM LACTATE AND CALCIUM CHLORIDE: 600; 310; 30; 20 INJECTION, SOLUTION INTRAVENOUS at 09:31

## 2021-06-27 RX ADMIN — PHENYLEPHRINE HYDROCHLORIDE 200 MCG: 10 INJECTION INTRAVENOUS at 09:43

## 2021-06-27 RX ADMIN — FLUCONAZOLE IN SODIUM CHLORIDE 200 MG: 2 INJECTION, SOLUTION INTRAVENOUS at 15:34

## 2021-06-27 RX ADMIN — FENTANYL CITRATE 50 MCG: 50 INJECTION, SOLUTION INTRAMUSCULAR; INTRAVENOUS at 09:40

## 2021-06-27 RX ADMIN — ROCURONIUM BROMIDE 40 MG: 10 SOLUTION INTRAVENOUS at 09:40

## 2021-06-27 RX ADMIN — SODIUM CHLORIDE, POTASSIUM CHLORIDE, SODIUM LACTATE AND CALCIUM CHLORIDE: 600; 310; 30; 20 INJECTION, SOLUTION INTRAVENOUS at 14:11

## 2021-06-27 RX ADMIN — METRONIDAZOLE 500 MG: 500 INJECTION, SOLUTION INTRAVENOUS at 00:00

## 2021-06-27 RX ADMIN — FENTANYL CITRATE 50 MCG: 50 INJECTION, SOLUTION INTRAMUSCULAR; INTRAVENOUS at 10:09

## 2021-06-27 ASSESSMENT — PAIN DESCRIPTION - DESCRIPTORS: DESCRIPTORS: SORE;DULL;DISCOMFORT

## 2021-06-27 ASSESSMENT — PULMONARY FUNCTION TESTS
PIF_VALUE: 16
PIF_VALUE: 0
PIF_VALUE: 15
PIF_VALUE: 0
PIF_VALUE: 2
PIF_VALUE: 1
PIF_VALUE: 22
PIF_VALUE: 17
PIF_VALUE: 19
PIF_VALUE: 16
PIF_VALUE: 4
PIF_VALUE: 15
PIF_VALUE: 16
PIF_VALUE: 17
PIF_VALUE: 16
PIF_VALUE: 16
PIF_VALUE: 25
PIF_VALUE: 16
PIF_VALUE: 0
PIF_VALUE: 17
PIF_VALUE: 17
PIF_VALUE: 16
PIF_VALUE: 19
PIF_VALUE: 17
PIF_VALUE: 1
PIF_VALUE: 16
PIF_VALUE: 16
PIF_VALUE: 17
PIF_VALUE: 16
PIF_VALUE: 2
PIF_VALUE: 16
PIF_VALUE: 16
PIF_VALUE: 15
PIF_VALUE: 3
PIF_VALUE: 16
PIF_VALUE: 16
PIF_VALUE: 19
PIF_VALUE: 16
PIF_VALUE: 4
PIF_VALUE: 18
PIF_VALUE: 1
PIF_VALUE: 21
PIF_VALUE: 0
PIF_VALUE: 16
PIF_VALUE: 1
PIF_VALUE: 22
PIF_VALUE: 15
PIF_VALUE: 19
PIF_VALUE: 19
PIF_VALUE: 16
PIF_VALUE: 1
PIF_VALUE: 0
PIF_VALUE: 18
PIF_VALUE: 16
PIF_VALUE: 10
PIF_VALUE: 23
PIF_VALUE: 16
PIF_VALUE: 16
PIF_VALUE: 21
PIF_VALUE: 14
PIF_VALUE: 16
PIF_VALUE: 18
PIF_VALUE: 17
PIF_VALUE: 17
PIF_VALUE: 15
PIF_VALUE: 1
PIF_VALUE: 0
PIF_VALUE: 17
PIF_VALUE: 16
PIF_VALUE: 0
PIF_VALUE: 16
PIF_VALUE: 23
PIF_VALUE: 1
PIF_VALUE: 18
PIF_VALUE: 11
PIF_VALUE: 16
PIF_VALUE: 18
PIF_VALUE: 16
PIF_VALUE: 1
PIF_VALUE: 16
PIF_VALUE: 17
PIF_VALUE: 16
PIF_VALUE: 0
PIF_VALUE: 3
PIF_VALUE: 16
PIF_VALUE: 28
PIF_VALUE: 17
PIF_VALUE: 20
PIF_VALUE: 21
PIF_VALUE: 18

## 2021-06-27 ASSESSMENT — PAIN SCALES - GENERAL
PAINLEVEL_OUTOF10: 0
PAINLEVEL_OUTOF10: 5
PAINLEVEL_OUTOF10: 0
PAINLEVEL_OUTOF10: 0
PAINLEVEL_OUTOF10: 8

## 2021-06-27 ASSESSMENT — PAIN DESCRIPTION - PAIN TYPE: TYPE: ACUTE PAIN

## 2021-06-27 ASSESSMENT — PAIN DESCRIPTION - ORIENTATION: ORIENTATION: MID

## 2021-06-27 ASSESSMENT — LIFESTYLE VARIABLES: SMOKING_STATUS: 0

## 2021-06-27 ASSESSMENT — PAIN DESCRIPTION - FREQUENCY: FREQUENCY: INTERMITTENT

## 2021-06-27 ASSESSMENT — PAIN DESCRIPTION - ONSET: ONSET: ON-GOING

## 2021-06-27 ASSESSMENT — PAIN DESCRIPTION - LOCATION: LOCATION: ABDOMEN

## 2021-06-27 ASSESSMENT — PAIN DESCRIPTION - PROGRESSION: CLINICAL_PROGRESSION: NOT CHANGED

## 2021-06-27 NOTE — OP NOTE
Operative Note      Patient: Lia Arora  YOB: 1933  MRN: [id number]    Date of Procedure: 6/27/2021    Pre-Op Diagnosis: SMALL BOWEL ISCHEMIA, severe sepsis, poor venous access, acute kidney injury, lactic acidemia    Post-Op Diagnosis: Small bowel ischemia, 180cm of nonviable bowel up to 15cm if IC valve, 140cm remaining from ligament of treitz, septic shock, lactic acidemia, acute kidney injury       Procedure(s):  EXPLORATORY LAPAROTOMY, SMALL BOWEL RESECTION, TRIPLE LUMEN CENTRAL VENOUS CATHETER INSERTION    Surgeon(s):  Alejandra Russell MD    Assistant:   First Assistant: Melvin Goldberg    Anesthesia: General    Estimated Blood Loss (mL): 67JM    Complications: None    Specimens:   ID Type Source Tests Collected by Time Destination   A : SMALL BOWEL Tissue  jejunum and terminal ileum SURGICAL PATHOLOGY Alejandra Russell MD 6/27/2021 1028        Implants:  * No implants in log *      Drains:   Urethral Catheter Straight-tip; Non-latex 16 fr (Active)       Findings:             Detailed Description of Procedure:   81yo female presented the emergency department with acute onset of abdominal pain a CT examination showed signs question ischemic bowel. She was administered IV fluids aggressively in order to resuscitate her prior to proceeding to the operating room. .  After being explained risk benefits alternatives of procedure she was urgently taken the operating room for diagnostic laparoscopy. She received IV antibiotics in the emergency department as well as IV fluid resuscitation. She was taken the operating room placed upon administered general anesthesia and intubated. When she was adequate sedated she was prepped and draped in normal sterile fashion timeout was performed confirm surgical site the patient's name.   She had bilateral PCVs placed and Torres catheter placed by the surgical team.  I initially made a 5 mm stab incision left upper quadrant inserted Veress needle confirmed to be Finding an area proximal and distal to the area of ischemia I used a SHIRLENE-75 stapler to staple across the bowel. The mesentery was then taken with the impact LigaSure device. This was passed off the table and labeled jejunum and terminal ileum. I then performed a side-to-side functional end-to-end anastomosis using another 2 fires of the SHIRLENE 75 stapler. My anastomosis came within 3 cm of the ileocecal valve but did not involve it. Palpation of the common lumen could adequately appreciate good clean anastomosis. 3-0 Vicryl suture was placed at the crotch of the anastomosis to take tension off the staple line. I then used interrupted 3-0 Vicryl stitches in order to close the mesenteric defect. I then placed this back within the abdominal cavity and then irrigated with 3 L of warm saline. Until the fluid returned clear. I then inspected the colon transverse colon descending colon and ascending colon all appeared healthy and viable there is no evidence of any ongoing problems with ischemia or malperfusion. This point I was satisfied with addressing the patient's primary complaint and surgical issue. I then performed a midline closure using oh loop PDS suture running from cephalad to caudad. I then irrigated out the subcu space and closed with staples. 4 Monocryl was used to close the 2 other 5 mm trocar sites in the left side of the abdomen. Glue was placed over these incisions and a sterile dressing was placed over the midline. Due to the ongoing sepsis burden we elected to leave the patient intubated due to his her poor venous access I elected to place a left subclavian triple-lumen catheter. Area over left clavicle was prepped and draped in normal sterile fashion I then intubated the left subclavian vein with a Cook needle passed a guidewire without any difficulty. I then threaded a catheter over the top the guidewire after dilating the tract secured in place with silk suture.   Good flash and flush was observed from all 3 ports. It was secured in place with a sterile dressing. Patient was then transferred to the recovery unit and then to the surgical intensive care unit in guarded condition for further resuscitation. All instrument counts lap counts no counts were correct at completion of the procedure. Chest x-ray was ordered in recovery in order to confirm placement of the triple-lumen catheter.     Electronically signed by Joan Dickson MD on 6/27/2021 at 11:10 AM

## 2021-06-27 NOTE — ANESTHESIA PRE PROCEDURE
Department of Anesthesiology  Preprocedure Note       Name:  Urban Worthy   Age:  80 y.o.  :  1933                                          MRN:  [id number]         Date:  2021      Surgeon: Ana Rosa Randall):  Amaryllis Boeck, MD    Procedure: Procedure(s):  DIAGNOSTIC LAPAROSCOPY POSS OPEN POSS BOWEL RESECTION    Medications prior to admission:   Prior to Admission medications    Medication Sig Start Date End Date Taking?  Authorizing Provider   aspirin 81 MG chewable tablet Take 81 mg by mouth daily    Historical Provider, MD   Multiple Vitamins-Minerals (WOMENS ONE DAILY PO) Take by mouth    Historical Provider, MD   Multiple Vitamins-Minerals (PRESERVISION AREDS 2 PO) Take by mouth    Historical Provider, MD   calcium carbonate (OSCAL) 500 MG TABS tablet Take 500 mg by mouth daily    Historical Provider, MD       Current medications:    Current Facility-Administered Medications   Medication Dose Route Frequency Provider Last Rate Last Admin    lactated ringers infusion   Intravenous Continuous Amaryllis Boeck,  mL/hr at 21 0706 New Bag at 21 0706    sodium chloride flush 0.9 % injection 10 mL  10 mL Intravenous 2 times per day Amaryllis Boeck, MD        sodium chloride flush 0.9 % injection 10 mL  10 mL Intravenous PRN Amaryllis Boeck, MD        0.9 % sodium chloride infusion  25 mL Intravenous PRN Amaryllis Boeck, MD        morphine (PF) injection 2 mg  2 mg Intravenous Q4H PRN Amaryllis Boeck, MD        Or    morphine injection 4 mg  4 mg Intravenous Q4H PRN Amaryllis Boeck, MD        pantoprazole (PROTONIX) injection 40 mg  40 mg Intravenous Daily Amaryllis Boeck, MD        And    sodium chloride (PF) 0.9 % injection 10 mL  10 mL Intravenous Daily Amaryllis Boeck, MD        ondansetron (ZOFRAN-ODT) disintegrating tablet 4 mg  4 mg Oral Q8H PRN Amaryllis Boeck, MD        Or    [facility] injection 4 mg  4 mg Intravenous Q6H PRN Amaryllis Boeck, MD       Fernanda Manual 5.77 06/27/2021    HGB 17.9 06/27/2021    HCT 55.6 06/27/2021    MCV 96.4 06/27/2021    RDW 12.6 06/27/2021     06/27/2021       CMP:   Lab Results   Component Value Date     06/27/2021    K 3.9 06/27/2021     06/27/2021    CO2 15 06/27/2021    BUN 20 06/27/2021    CREATININE 1.2 06/27/2021    GFRAA 51 06/27/2021    LABGLOM 42 06/27/2021    GLUCOSE 249 06/27/2021    PROT 7.7 06/26/2021    CALCIUM 9.1 06/27/2021    BILITOT 0.6 06/26/2021    ALKPHOS 112 06/26/2021    AST 22 06/26/2021    ALT 17 06/26/2021       POC Tests: No results for input(s): POCGLU, POCNA, POCK, POCCL, POCBUN, POCHEMO, POCHCT in the last 72 hours. Coags:   Lab Results   Component Value Date    PROTIME 11.9 06/26/2021    INR 1.0 06/26/2021    APTT 20.6 06/26/2021       HCG (If Applicable): No results found for: PREGTESTUR, PREGSERUM, HCG, HCGQUANT     ABGs: No results found for: PHART, PO2ART, MQS4UMP, ILW5UYF, BEART, W9UXNNOJ     Type & Screen (If Applicable):  No results found for: LABABO, LABRH    Drug/Infectious Status (If Applicable):  No results found for: HIV, HEPCAB    COVID-19 Screening (If Applicable): No results found for: COVID19        Anesthesia Evaluation  Patient summary reviewed no history of anesthetic complications:   Airway: Mallampati: III  TM distance: >3 FB   Neck ROM: full  Mouth opening: > = 3 FB and < 3 FB Dental: normal exam         Pulmonary:Negative Pulmonary ROS breath sounds clear to auscultation      (-) not a current smoker                           Cardiovascular:Negative CV ROS          ECG reviewed  Rhythm: regular  Rate: normal                    Neuro/Psych:   Negative Neuro/Psych ROS              GI/Hepatic/Renal:            ROS comment: Small bowel ischemia. Endo/Other: Negative Endo/Other ROS                    Abdominal:             Vascular: negative vascular ROS.          Other Findings:           Anesthesia Plan      general     ASA 3 - emergent       Induction: intravenous. arterial line  MIPS: Postoperative opioids intended and Prophylactic antiemetics administered. Anesthetic plan and risks discussed with patient. Use of blood products discussed with patient whom consented to blood products. Plan discussed with CRNA.                 Bethany Fuentes MD   6/27/2021

## 2021-06-27 NOTE — ED NOTES
Ok to bolus LR in per Dr. Adriana Echeverria. LR on pressure bag.      Aidan Villatoro RN  06/27/21 3734

## 2021-06-27 NOTE — ANESTHESIA POSTPROCEDURE EVALUATION
Department of Anesthesiology  Postprocedure Note    Patient: Elizabet Salas  MRN: [id number]  YOB: 1933  Date of evaluation: 6/27/2021  Time:  3:42 PM     Procedure Summary     Date: 06/27/21 Room / Location: [address] [place] [address] / [address] [place] [address]    Anesthesia Start: 0119 Anesthesia Stop: 1110    Procedure: EXPLORATORY LAPAROTOMY, SMALL BOWEL RESECTION, TRIPLE LUMEN CENTRAL VENOUS CATHETER INSERTION (N/A ) Diagnosis: (SMALL BOWEL ISCHEMIA)    Surgeons: Kell Ruano MD Responsible Provider: Jey Prado MD    Anesthesia Type: general ASA Status: 3 - Emergent          Anesthesia Type: general    Miriam Phase I: Miriam Score: 9    Miriam Phase II:      Last vitals: Reviewed and per EMR flowsheets.        Anesthesia Post Evaluation    Patient location during evaluation: ICU  Patient participation: complete - patient participated  Level of consciousness: sedated and ventilated  Airway patency: patent  Complications: no  Cardiovascular status: hemodynamically stable  Respiratory status: ventilator and intubated

## 2021-06-27 NOTE — ANESTHESIA PROCEDURE NOTES
Arterial Line:    An arterial line was placed using surface landmarks, in the holding area for the following indication(s): continuous blood pressure monitoring and blood sampling needed. A 20 gauge (size), 1 and 3/4 inch (length), Arrow (type) catheter was placed, Seldinger technique used, into the left radial artery, secured by Tegaderm and tape. Anesthesia type: Local  Local infiltration: Injection    Events:  patient tolerated procedure well with no complications.   6/27/2021 8:25 AM6/27/2021 8:30 AM  Anesthesiologist: Juan Manuel Adrian MD  Preanesthetic Checklist  Completed: patient identified, IV checked, site marked, risks and benefits discussed, surgical consent, monitors and equipment checked, pre-op evaluation, timeout performed, anesthesia consent given, oxygen available and patient being monitored

## 2021-06-27 NOTE — CONSULTS
Department of Internal Medicine  Internal Medicine Consultation Note    Primary Care Physician: Pasquale Baugh MD   Admitting Physician:  Rosetta Stevenson MD  Admission date and time: 6/26/2021  6:05 PM    Room:  [place] [address]  Admitting diagnosis: Small bowel ischemia [facility]) [K55.9]    Patient Name: Aminta Schuler  MRN: [id number]    Date of Service: 6/27/2021     Reason for consultation: Medical management    History of present illness: The patient is an 42-year-old female who was seen and examined in the recovery room postoperatively. Patient was admitted to the hospital with small bowel ischemia, severe sepsis, acute kidney injury and lactic acidemia and was taken to surgery and underwent exploratory laparotomy, small bowel resection, triple-lumen central venous catheter insertion. No family was present therefore HPI was obtained from medical record review. As per emergency room note patient presented to the emergency department with a complaint of abdominal pain. Patient had stated that she started with a twinge of lower abdominal pain the previous night. Patient felt it was secondary to the foods that she had eaten-beans and soup from the [place]. Then approximately 11 AM yesterday the pain did become worse. Patient had complaint of sharp pains that were crossed her abdomen. The pain apparently radiated on each side into her back. Patient was also having issue with intermittent nausea. Was also reported the patient had dry heaving and regurgitation but no outright vomiting. Patient's last bowel movement was the morning of admission. No reported diarrhea. No urinary complaints. Pain was apparently 6 out of 10. 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   General surgeon, Dr. Brianne Arellano, was consulted. After evaluation it was determined that the patient would need to undergo surgical intervention and again underwent exploratory laparotomy, small bowel resection, triple lumen central venous catheter insertion for poor venous access. PAST MEDICAL Hx:  History reviewed. No pertinent past medical history. PAST SURGICAL Hx:   Past Surgical History:   Procedure Laterality Date    CHOLECYSTECTOMY      HYSTERECTOMY         FAMILY Hx:  History reviewed. No pertinent family history. Parents are . HOME MEDICATIONS:  Prior to Admission medications    Medication Sig Start Date End Date Taking? Authorizing Provider   aspirin 81 MG chewable tablet Take 81 mg by mouth daily    Historical Provider, MD   Multiple Vitamins-Minerals (WOMENS ONE DAILY PO) Take by mouth    Historical Provider, MD   Multiple Vitamins-Minerals (PRESERVISION AREDS 2 PO) Take by mouth    Historical Provider, MD   calcium carbonate (OSCAL) 500 MG TABS tablet Take 500 mg by mouth daily    Historical Provider, MD       ALLERGIES:  Nut [peanut-containing drug products] and Statins    SOCIAL Hx:  Social History     Socioeconomic History    Marital status:       Spouse name: Not on file    Number of children: Not on file    Years of education: Not on file    Highest education level: Not on file   Occupational History    Not on file   Tobacco Use    Smoking status: Never Smoker    Smokeless tobacco: Never Used   Vaping Use    Vaping Use: Never used   Substance and Sexual Activity    Alcohol use: No    Drug use: Never    Sexual activity: Not Currently     Partners: Male   Other Topics Concern    Not on file   Social History Narrative    Not on file     Social Determinants of Health     Financial Resource Strain:     Difficulty of Paying Living Expenses:    Food Insecurity:     Worried About [address] in the Last Year:     [address] in the Last Year:    Transportation Needs:     Lack of Transportation (Medical):     Lack of Transportation (Non-Medical):    Physical Activity:     Days of Exercise per Week:     Minutes of Exercise per Session:    Stress:     Feeling of Stress :    Social Connections:     Frequency of Communication with Friends and Family:     Frequency of Social Gatherings with Friends and Family:     Attends Denominational Services: Active Member of Clubs or Organizations:     Attends Club or Organization Meetings:     Marital Status:    Intimate Partner Violence:     Fear of Current or Ex-Partner:     Emotionally Abused:     Physically Abused:     Sexually Abused:        ROS-unable to obtain-patient intubated:    PHYSICAL EXAM:  VITALS:  Blood pressure 114/68, pulse 94, temperature 98.2 °F (36.8 °C), temperature source Axillary, resp. rate 18, height 5' (1.524 m), weight 135 lb (61.2 kg), SpO2 100 %. CONSTITUTIONAL:    Intubated. In no acute distress. Currently on Diprovan drip    HEENT:  Grossly normal to visual inspection with exception of patient is currently intubated. Shree Ra NECK:    Supple, symmetrical, trachea midline, no adenopathy, thyroid symmetric, not enlarged and no tenderness, skin normal, no bruits, no JVD    HEMATOLOGIC/LYMPHATICS:    No cervical lymphadenopathy and no supraclavicular lymphadenopathy    LUNGS:    Symmetric. Clear to auscultation. Breathing same right is ventilator. CARDIOVASCULAR:    Normal apical impulse, regular rate and rhythm, normal S1 and S2, no S3 or S4, and no murmur noted    ABDOMEN:    Normal contour, normal bowel sounds, soft, midline incision with dressing intact. MUSCULOSKELETAL:    There is no redness, warmth, or swelling of the joints. Tone is normal.    NEUROLOGIC:    Currently on Diprovan drip. SKIN:    No bruising or bleeding. No redness, warmth, or swelling. Midline abdominal incision with dressing intact. EXTREMITIES:    Peripheral pulses present. No edema, cyanosis, or swelling.     LABORATORY DATA:  CBC with Differential:    Lab Results   Component Value Date    WBC 15.1 06/27/2021    RBC 4.73 06/27/2021    HGB 15.0 06/27/2021    HCT 43.4 06/27/2021     06/27/2021    MCV 91.8 06/27/2021    MCH 31.7 06/27/2021    MCHC 34.6 06/27/2021    RDW 12.5 06/27/2021    LYMPHOPCT 6.3 06/27/2021    MONOPCT 6.5 06/27/2021    BASOPCT 0.1 06/27/2021    MONOSABS 0.98 06/27/2021    LYMPHSABS 0.95 06/27/2021    EOSABS 0.00 06/27/2021    BASOSABS 0.02 06/27/2021     CMP:    Lab Results   Component Value Date     06/27/2021    K 3.9 06/27/2021     06/27/2021    CO2 16 06/27/2021    BUN 22 06/27/2021    CREATININE 0.9 06/27/2021    GFRAA >60 06/27/2021    LABGLOM 59 06/27/2021    GLUCOSE 170 06/27/2021    PROT 4.0 06/27/2021    LABALBU 2.5 06/27/2021    CALCIUM 8.3 06/27/2021    BILITOT 0.3 06/27/2021    ALKPHOS 74 06/27/2021    AST 14 06/27/2021    ALT 10 06/27/2021       ASSESSMENT:  Ischemic small bowel status post exploratory laparotomy, small bowel resection performed on June 27th by Dr. Марина Rodriguez. Severe sepsis secondary to #1  Acute postoperative respiratory insufficiency  Postoperative hypotension  Acute kidney injury  Leukocytosis secondary to #1      PLAN:  Patient is currently sedated and on ventilator. In no acute distress. Patient is to be admitted to the intensive care unit for close monitoring. Consult has been made to the intensivist.  Continue ventilator management. Antibiotic therapy as prescribed. DVT prophylaxis. Continue IV fluids for gentle hydration. Pain management. Once extubated and awake diet as per general surgery. GI prophylaxis. Patient was seen, examined, and then discussed with Dr. Liliana Bishop. Please see orders for further plan of care.     [facility], APRN - CNP,   4:16 PM  6/27/2021    Electronically signed by [facility], APRN - CNP on 6/27/21 at 4:16 PM EDT    I have personally participated in the medical decision making with the nurse practitioner on the date of service and I agree with all the pertinent clinical information unless otherwise noted. I have also reviewed and agree with the past medical, family, and social history unless otherwise noted.       Lisandro Venegas DO, D.O., FACOI  4:42 PM  6/27/2021

## 2021-06-27 NOTE — PROGRESS NOTES
Admitted  to PACU 5 from OR #7 Ett tube intact at 21 c connected to Vent , FiO2 60% A/C 10 peep 5 assisting. Lungs CTA Abdomen soft with 2 lap sites and dry dressing. Active bowel sounds. NGT intact left nares connected to LIS draining dark green. Iv infusing per proximal port left TLC.   1130 Portable chest xray done  To verify TLC, ETT, NGT.  Diprovan drip infusing

## 2021-06-27 NOTE — CONSULTS
Assessment and Plan  Patient is a 80 y.o. female with the following medical Problems:   1. acute postoperative respiratory insufficiency as expected  2. Ischemic bowel  3. Status post exlaparotomy with small bowel resection      Plan of care:  1. Patient is awake and following commands. We will proceed with spontaneous breathing trial and possible extubation  2. Continue with Zosyn but add fluconazole  3. DVT prophylaxis  4. IV fluid  5.  N.p.o. until cleared by general surgery  6. NGT to intermittent suction  7.  GI prophylaxis    History of Present Illness:   Patient is a 27-year-old woman who presented to the emergency room with abdominal pain. 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 Patient underwent exploratory laparotomy and small bowel resection. Postoperatively patient was lethargic and was transferred to the ICU. She is currently hemodynamically stable, undergoing spontaneous breathing trial and possible extubation. Past Medical History:  History reviewed. No pertinent past medical history. Family History:   History reviewed. No pertinent family history. Allergies:         Nut [peanut-containing drug products] and Statins    Social history:  Social History     Socioeconomic History    Marital status:       Spouse name: Not on file    Number of children: Not on file    Years of education: Not on file    Highest education level: Not on file   Occupational History    Not on file   Tobacco Use    Smoking status: Never Smoker    Smokeless tobacco: Never Used   Vaping Use    Vaping Use: Never used   Substance and Sexual Activity    Alcohol use: No    Drug use: Never    Sexual activity: Not Currently     Partners: Male   Other Topics Concern    Not on file   Social History Narrative    Not on file     Social Determinants of Health     Financial Resource Strain:     Difficulty of Paying Living Expenses:    Food Insecurity:     Worried About Running Out of Food in the Last Year:     [address] in the Last Year:    Transportation Needs:     Lack of Transportation (Medical):      Lack of Transportation (Non-Medical):    Physical Activity:     Days of Exercise per Week:     Minutes of Exercise per Session:    Stress:     Feeling of Stress :    Social Connections:     Frequency of Communication with Friends and Family:     Frequency of Social Gatherings with Friends and Family:     Attends Mosque Services:     Active Member of Clubs or Organizations:     Attends Club or Organization Meetings:     Marital Status:    Intimate Partner Violence:     Fear of Current or Ex-Partner:     Emotionally Abused:     Physically Abused:     Sexually Abused:        Current Medications:     Current Facility-Administered Medications:     lactated ringers infusion, , Intravenous, Continuous, Paulino Sykes MD, Last Rate: 125 mL/hr at 06/27/21 1411, New Bag at 06/27/21 1411    sodium chloride flush 0.9 % injection 10 mL, 10 mL, Intravenous, 2 times per day, Paulino Sykes MD    sodium chloride flush 0.9 % injection 10 mL, 10 mL, Intravenous, PRN, Paulino Sykes MD    0.9 % sodium chloride infusion, 25 mL, Intravenous, PRN, Paulino Sykes MD    morphine (PF) injection 2 mg, 2 mg, Intravenous, Q4H PRN **OR** morphine injection 4 mg, 4 mg, Intravenous, Q4H PRN, Paulino Sykes MD    pantoprazole (PROTONIX) injection 40 mg, 40 mg, Intravenous, Daily, 40 mg at 06/27/21 0840 **AND** sodium chloride (PF) 0.9 % injection 10 mL, 10 mL, Intravenous, Daily, Paulino Sykes MD    ondansetron (ZOFRAN-ODT) disintegrating tablet 4 mg, 4 mg, Oral, Q8H PRN **OR** ondansetron (ZOFRAN) injection 4 mg, 4 mg, Intravenous, Q6H PRN, Dennie Bucker Angelica Lal MD  [facility] [place] 6/28/2021] enoxaparin (LOVENOX) injection 40 mg, 40 mg, Subcutaneous, Daily, Maeve Jones MD    piperacillin-tazobactam (ZOSYN) 3,375 mg in dextrose 5 % 50 mL IVPB extended infusion (mini-bag), 3,375 mg, Intravenous, Q8H, Last Rate: 12.5 mL/hr at 06/27/21 0841, 3,375 mg at 06/27/21 0841 **AND** 0.9 % sodium chloride infusion, 25 mL, Intravenous, Q8H, Maeve Jones MD    propofol 1000 MG/100ML injection, , , ,     propofol injection, 5-50 mcg/kg/min, Intravenous, Titrated, Zeina Hinojosa MD, Last Rate: 3.7 mL/hr at 06/27/21 1124, 10 mcg/kg/min at 06/27/21 1124    fluconazole (DIFLUCAN) in 0.9 % sodium chloride IVPB 200 mg, 200 mg, Intravenous, Q24H, Issac Mcgee MD    Review of Systems:   Unable to obtain due to medical condition    Physical Exam:   Vital Signs:  /68   Pulse 94   Temp 98.2 °F (36.8 °C) (Axillary)   Resp 18   Ht 5' (1.524 m)   Wt 135 lb (61.2 kg)   SpO2 100%   BMI 26.37 kg/m²     Input/Output: In: 800 [I.V.:800]  Out: 575     Oxygen requirements: MV    Ventilator Information:  Vent Information  Skin Assessment: Clean, dry, & intact  FiO2 : 60 %  SpO2: 100 %  SpO2/FiO2 ratio: 166.67    General appearance: Well developed, not in pain or distress, in no respiratory distress    HEENT: Intubated  Neck: Supple, no jugular venous distension, lymphadenopathy, thyromegaly or carotid bruits  Chest: Decreased breath sounds, no wheezing, +ve crackles and no tenderness over ribs   Cardiovascular: Normal S1 , S2, regular rate and rhythm, no murmur, rub or gallop  Abdomen: Normal sounds present, soft, lax with no tenderness, no hepatosplenomegaly, and no masses  Extremities: No edema. Pulses are equally present. Skin: intact, no rashes   Neurologic: Awake and follows commands of sedation, no focal deficit.      Investigations:  Labs, radiological imaging and cardiac work up were reviewed        ICU STAFF PHYSICIAN NOTE OF PERSONAL INVOLVEMENT IN CARE  As the attending physician, I certify that I personally reviewed the patient's history and personally examined the patient to confirm the physical findings described above, and that I reviewed the relevant imaging studies and available reports. I also discussed the differential diagnosis and all of the proposed management plans with the patient and individuals accompanying the patient to this visit. They had the opportunity to ask questions about the proposed management plans and to have those questions answered. This patient has a high probability of sudden, clinically significant deterioration, which requires the highest level of physician preparedness to intervene urgently. I managed/supervised life or organ supporting interventions that required frequent physician assessment. I devoted my full attention to the direct care of this patient for the amount of time indicated below. Time I spent with family or surrogate(s) is included only if the patient was incapable of providing the necessary information or participating in medical decision-making. Time devoted to teaching and to any procedures I billed separately is not included.   Critical Care Time: 35 minutes      Electronically signed by Dash Thompson MD on 6/27/2021 at 2:34 PM

## 2021-06-27 NOTE — ED NOTES
Dr. Gema Acosta at the bedside. Aware of vital signs. Ok to give 0.9% NS bolus at this time. NS on pressure bag at this time.      Clarence Ferguson RN  06/27/21 0958

## 2021-06-27 NOTE — H&P
General Surgery History and Physical  [facility]    Patient's Name/Date of Birth: Basilio Remy / 8/9/1933    Date: June 27, 2021     Surgeon: Kedar Hogue M.D.    PCP: Sean Canchola MD     Chief Complaint: Abd pain    HPI:   Basilio Remy is a 80 y.o. female who presents for evaluation of abdominal pain. Timing is constant, radiation to midline, alleviated by npo and started Friday evening and severity is 8/10. Seen in ER with concerning findings on CT. Vitals stable with normal lactate on presentation. Admits nausea, vomiting, per nursing some bloody stools. Denies SOB, chest pain. She is alert and awake and pleasant to speak with. States pain after lunch Friday. Patient Active Problem List   Diagnosis    Small bowel ischemia ([address]       History reviewed. No pertinent past medical history. Past Surgical History:   Procedure Laterality Date    CHOLECYSTECTOMY      HYSTERECTOMY         Allergies   Allergen Reactions    Nut [Peanut-Containing Drug Products] Swelling     WALNUTS/ PECANS/ TREE NUTS    Statins        The patient has a family history that is negative for severe cardiovascular or respiratory issues, negative for reaction to anesthesia. Time spent reviewing past medical, surgical, social and family history, vitals, nursing assessment and images. No changes from above documented history. Social History     Socioeconomic History    Marital status:       Spouse name: Not on file    Number of children: Not on file    Years of education: Not on file    Highest education level: Not on file   Occupational History    Not on file   Tobacco Use    Smoking status: Never Smoker    Smokeless tobacco: Never Used   Vaping Use    Vaping Use: Never used   Substance and Sexual Activity    Alcohol use: No    Drug use: Never    Sexual activity: Not Currently     Partners: Male   Other Topics Concern    Not on file   Social History Narrative    Not on file     Social Determinants of Health     Financial Resource Strain:     Difficulty of Paying Living Expenses:    Food Insecurity:     Worried About Running Out of Food in the Last Year:     [address] in the Last Year:    Transportation Needs:     Lack of Transportation (Medical):  Lack of Transportation (Non-Medical):    Physical Activity:     Days of Exercise per Week:     Minutes of Exercise per Session:    Stress:     Feeling of Stress :    Social Connections:     Frequency of Communication with Friends and Family:     Frequency of Social Gatherings with Friends and Family:     Attends Anglican Services:     Active Member of Clubs or Organizations:     Attends Club or Organization Meetings:     Marital Status:    Intimate Partner Violence:     Fear of Current or Ex-Partner:     Emotionally Abused:     Physically Abused:     Sexually Abused:        I have reviewed relevant labs from this admission and interpretation is included in my assessment and plan    Review of Systems    A complete 10 system review was performed and are otherwise negative unless mentioned in the above HPI. Specific negatives are listed below but may not include all those reviewed.     General ROS: negative obtundation, AMS  ENT ROS: negative rhinorrhea, epistaxis  Allergy and Immunology ROS: negative itchy/watery eyes or nasal congestion  Hematological and Lymphatic ROS: negative spontaneous bleeding or bruising  Endocrine ROS: negative  lethargy, mood swings, palpitations or polydipsia/polyuria  Respiratory ROS: negative sputum changes, stridor, tachypnea or wheezing  Cardiovascular ROS: negative for - loss of consciousness, murmur or orthopnea  Gastrointestinal ROS: negative for - hematochezia or hematemesis  Genito-Urinary ROS: negative for -  genital discharge or hematuria  Musculoskeletal ROS: negative for - focal weakness, gangrene  Psych/Neuro ROS: negative for - visual or auditory hallucinations, suicidal ideation    Physical exam:   /67   Pulse 92   Temp 97.3 °F (36.3 °C)   Resp 18   Ht 5' (1.524 m)   Wt 135 lb (61.2 kg)   SpO2 98%   BMI 26.37 kg/m²   General appearance:  NAD, appears stated age  Head: NCAT, PERRLA, EOMI, red conjunctiva  Neck: supple, no masses, trachea midline  Lungs: Equal chest rise bilateral, no retractions, no wheezing  Heart: Reg rate  Abdomen: soft, tender mild, mildly distended  Skin; warm and dry, no cyanosis  Gu: no cva tenderness  Extremities: atraumatic, no focal motor deficits, no open wounds  Psych: No tremor, visual hallucinations      Radiology: I reviewed relevant abdominal imaging from this admission and that available in the EMR including CT abd/pel from admission.  My assessment is concerns for mesenteric volvuluts    Assessment:  Collin Tapia is a 80 y.o. female with abd pain, mesenteric volvulus, concerns for ischemic bowel  Patient Active Problem List   Diagnosis    Small bowel ischemia ([address]         Plan:  Admit  IV abx  IVF  OR for dx lap poss open poss bowel resection  Discussed with son and daughter in law via phone  TIM Fletcher MD  8:12 AM  6/27/2021

## 2021-06-27 NOTE — PROGRESS NOTES
Sleeping quietly arouses with stimulation Emotional support given Respirations easy Report Called to RN ICCU.  Transferred to [employer]

## 2021-06-28 LAB
ANION GAP SERPL CALCULATED.3IONS-SCNC: 6 MMOL/L (ref 7–16)
BASOPHILS ABSOLUTE: 0 E9/L (ref 0–0.2)
BASOPHILS RELATIVE PERCENT: 0.1 % (ref 0–2)
BUN BLDV-MCNC: 28 MG/DL (ref 6–23)
BURR CELLS: ABNORMAL
CALCIUM SERPL-MCNC: 7.9 MG/DL (ref 8.6–10.2)
CHLORIDE BLD-SCNC: 106 MMOL/L (ref 98–107)
CO2: 24 MMOL/L (ref 22–29)
CREAT SERPL-MCNC: 0.9 MG/DL (ref 0.5–1)
EOSINOPHILS ABSOLUTE: 0 E9/L (ref 0.05–0.5)
EOSINOPHILS RELATIVE PERCENT: 0 % (ref 0–6)
GFR AFRICAN AMERICAN: >60
GFR NON-AFRICAN AMERICAN: 59 ML/MIN/1.73
GLUCOSE BLD-MCNC: 147 MG/DL (ref 74–99)
HCT VFR BLD CALC: 34.5 % (ref 34–48)
HEMOGLOBIN: 11.5 G/DL (ref 11.5–15.5)
LACTIC ACID: 0.8 MMOL/L (ref 0.5–2.2)
LYMPHOCYTES ABSOLUTE: 0.99 E9/L (ref 1.5–4)
LYMPHOCYTES RELATIVE PERCENT: 7 % (ref 20–42)
MCH RBC QN AUTO: 31.3 PG (ref 26–35)
MCHC RBC AUTO-ENTMCNC: 33.3 % (ref 32–34.5)
MCV RBC AUTO: 94 FL (ref 80–99.9)
MONOCYTES ABSOLUTE: 0.71 E9/L (ref 0.1–0.95)
MONOCYTES RELATIVE PERCENT: 5.2 % (ref 2–12)
NEUTROPHILS ABSOLUTE: 12.5 E9/L (ref 1.8–7.3)
NEUTROPHILS RELATIVE PERCENT: 87.8 % (ref 43–80)
OVALOCYTES: ABNORMAL
PDW BLD-RTO: 12.9 FL (ref 11.5–15)
PHOSPHORUS: 4.1 MG/DL (ref 2.5–4.5)
PLATELET # BLD: 159 E9/L (ref 130–450)
PMV BLD AUTO: 10.3 FL (ref 7–12)
POIKILOCYTES: ABNORMAL
POLYCHROMASIA: ABNORMAL
POTASSIUM REFLEX MAGNESIUM: 3.8 MMOL/L (ref 3.5–5)
RBC # BLD: 3.67 E12/L (ref 3.5–5.5)
SODIUM BLD-SCNC: 136 MMOL/L (ref 132–146)
WBC # BLD: 14.2 E9/L (ref 4.5–11.5)

## 2021-06-28 PROCEDURE — 2580000003 HC RX 258: Performed by: INTERNAL MEDICINE

## 2021-06-28 PROCEDURE — 2500000003 HC RX 250 WO HCPCS: Performed by: INTERNAL MEDICINE

## 2021-06-28 PROCEDURE — 83605 ASSAY OF LACTIC ACID: CPT

## 2021-06-28 PROCEDURE — 84100 ASSAY OF PHOSPHORUS: CPT

## 2021-06-28 PROCEDURE — 85025 COMPLETE CBC W/AUTO DIFF WBC: CPT

## 2021-06-28 PROCEDURE — 2580000003 HC RX 258: Performed by: SURGERY

## 2021-06-28 PROCEDURE — 6360000002 HC RX W HCPCS: Performed by: SURGERY

## 2021-06-28 PROCEDURE — 36415 COLL VENOUS BLD VENIPUNCTURE: CPT

## 2021-06-28 PROCEDURE — C9113 INJ PANTOPRAZOLE SODIUM, VIA: HCPCS | Performed by: SURGERY

## 2021-06-28 PROCEDURE — 2000000000 HC ICU R&B

## 2021-06-28 PROCEDURE — 6360000002 HC RX W HCPCS: Performed by: INTERNAL MEDICINE

## 2021-06-28 PROCEDURE — 80048 BASIC METABOLIC PNL TOTAL CA: CPT

## 2021-06-28 PROCEDURE — 2580000003 HC RX 258: Performed by: NURSE PRACTITIONER

## 2021-06-28 RX ORDER — DILTIAZEM HYDROCHLORIDE 5 MG/ML
5 INJECTION INTRAVENOUS ONCE
Status: COMPLETED | OUTPATIENT
Start: 2021-06-28 | End: 2021-06-28

## 2021-06-28 RX ORDER — SODIUM CHLORIDE, SODIUM LACTATE, POTASSIUM CHLORIDE, AND CALCIUM CHLORIDE .6; .31; .03; .02 G/100ML; G/100ML; G/100ML; G/100ML
500 INJECTION, SOLUTION INTRAVENOUS ONCE
Status: COMPLETED | OUTPATIENT
Start: 2021-06-28 | End: 2021-06-28

## 2021-06-28 RX ORDER — POTASSIUM CHLORIDE 29.8 MG/ML
20 INJECTION INTRAVENOUS ONCE
Status: COMPLETED | OUTPATIENT
Start: 2021-06-28 | End: 2021-06-28

## 2021-06-28 RX ORDER — METOPROLOL TARTRATE 5 MG/5ML
5 INJECTION INTRAVENOUS ONCE
Status: COMPLETED | OUTPATIENT
Start: 2021-06-28 | End: 2021-06-28

## 2021-06-28 RX ADMIN — SODIUM CHLORIDE, PRESERVATIVE FREE 10 ML: 5 INJECTION INTRAVENOUS at 09:25

## 2021-06-28 RX ADMIN — MORPHINE SULFATE 2 MG: 2 INJECTION, SOLUTION INTRAMUSCULAR; INTRAVENOUS at 06:46

## 2021-06-28 RX ADMIN — SODIUM CHLORIDE, POTASSIUM CHLORIDE, SODIUM LACTATE AND CALCIUM CHLORIDE 500 ML: 600; 310; 30; 20 INJECTION, SOLUTION INTRAVENOUS at 00:15

## 2021-06-28 RX ADMIN — DILTIAZEM HYDROCHLORIDE 5 MG/HR: 5 INJECTION, SOLUTION INTRAVENOUS at 12:32

## 2021-06-28 RX ADMIN — METOPROLOL TARTRATE 5 MG: 1 INJECTION, SOLUTION INTRAVENOUS at 11:25

## 2021-06-28 RX ADMIN — PIPERACILLIN SODIUM AND TAZOBACTAM SODIUM 3375 MG: 3; .375 INJECTION, POWDER, LYOPHILIZED, FOR SOLUTION INTRAVENOUS at 00:30

## 2021-06-28 RX ADMIN — SODIUM CHLORIDE, POTASSIUM CHLORIDE, SODIUM LACTATE AND CALCIUM CHLORIDE 500 ML: 600; 310; 30; 20 INJECTION, SOLUTION INTRAVENOUS at 05:33

## 2021-06-28 RX ADMIN — Medication 10 ML: at 09:24

## 2021-06-28 RX ADMIN — SODIUM CHLORIDE 25 ML: 9 INJECTION, SOLUTION INTRAVENOUS at 12:33

## 2021-06-28 RX ADMIN — FLUCONAZOLE IN SODIUM CHLORIDE 200 MG: 2 INJECTION, SOLUTION INTRAVENOUS at 15:04

## 2021-06-28 RX ADMIN — ENOXAPARIN SODIUM 40 MG: 40 INJECTION SUBCUTANEOUS at 09:23

## 2021-06-28 RX ADMIN — DILTIAZEM HYDROCHLORIDE 5 MG: 5 INJECTION INTRAVENOUS at 12:31

## 2021-06-28 RX ADMIN — PIPERACILLIN SODIUM AND TAZOBACTAM SODIUM 3375 MG: 3; .375 INJECTION, POWDER, LYOPHILIZED, FOR SOLUTION INTRAVENOUS at 09:24

## 2021-06-28 RX ADMIN — PANTOPRAZOLE SODIUM 40 MG: 40 INJECTION, POWDER, FOR SOLUTION INTRAVENOUS at 09:23

## 2021-06-28 RX ADMIN — SODIUM CHLORIDE, POTASSIUM CHLORIDE, SODIUM LACTATE AND CALCIUM CHLORIDE: 600; 310; 30; 20 INJECTION, SOLUTION INTRAVENOUS at 07:53

## 2021-06-28 RX ADMIN — PIPERACILLIN SODIUM AND TAZOBACTAM SODIUM 3375 MG: 3; .375 INJECTION, POWDER, LYOPHILIZED, FOR SOLUTION INTRAVENOUS at 16:56

## 2021-06-28 RX ADMIN — Medication 10 ML: at 22:06

## 2021-06-28 RX ADMIN — PIPERACILLIN SODIUM AND TAZOBACTAM SODIUM 3375 MG: 3; .375 INJECTION, POWDER, LYOPHILIZED, FOR SOLUTION INTRAVENOUS at 23:41

## 2021-06-28 RX ADMIN — POTASSIUM CHLORIDE 20 MEQ: 29.8 INJECTION, SOLUTION INTRAVENOUS at 13:18

## 2021-06-28 RX ADMIN — DILTIAZEM HYDROCHLORIDE 10 MG/HR: 5 INJECTION, SOLUTION INTRAVENOUS at 23:50

## 2021-06-28 RX ADMIN — SODIUM CHLORIDE 25 ML: 9 INJECTION, SOLUTION INTRAVENOUS at 22:04

## 2021-06-28 RX ADMIN — SODIUM CHLORIDE, POTASSIUM CHLORIDE, SODIUM LACTATE AND CALCIUM CHLORIDE: 600; 310; 30; 20 INJECTION, SOLUTION INTRAVENOUS at 16:56

## 2021-06-28 RX ADMIN — SODIUM CHLORIDE 25 ML: 9 INJECTION, SOLUTION INTRAVENOUS at 04:30

## 2021-06-28 ASSESSMENT — PAIN DESCRIPTION - PROGRESSION
CLINICAL_PROGRESSION: NOT CHANGED

## 2021-06-28 ASSESSMENT — PAIN SCALES - GENERAL
PAINLEVEL_OUTOF10: 0
PAINLEVEL_OUTOF10: 5
PAINLEVEL_OUTOF10: 3
PAINLEVEL_OUTOF10: 0
PAINLEVEL_OUTOF10: 3
PAINLEVEL_OUTOF10: 0

## 2021-06-28 ASSESSMENT — ENCOUNTER SYMPTOMS: TACHYPNEA: 1

## 2021-06-28 ASSESSMENT — PAIN DESCRIPTION - FREQUENCY: FREQUENCY: INTERMITTENT

## 2021-06-28 ASSESSMENT — PAIN DESCRIPTION - DESCRIPTORS: DESCRIPTORS: DULL;SORE;DISCOMFORT

## 2021-06-28 ASSESSMENT — PAIN - FUNCTIONAL ASSESSMENT: PAIN_FUNCTIONAL_ASSESSMENT: ACTIVITIES ARE NOT PREVENTED

## 2021-06-28 ASSESSMENT — PAIN DESCRIPTION - PAIN TYPE: TYPE: ACUTE PAIN

## 2021-06-28 ASSESSMENT — PAIN DESCRIPTION - ORIENTATION: ORIENTATION: MID

## 2021-06-28 ASSESSMENT — PAIN DESCRIPTION - ONSET: ONSET: ON-GOING

## 2021-06-28 ASSESSMENT — PAIN DESCRIPTION - LOCATION: LOCATION: ABDOMEN

## 2021-06-28 NOTE — CARE COORDINATION
6-28-Cm note: ( no covid testing) met with pt for transition of care needs, pt lives alone, she is independent, pt wearing 2l nc now, no home 02,  Pt on cardizem gtt at this time. Pt  denies any needs for dc at this time, states she potted 19 plants at home and \"that's how active I am\" . Pt's son or DIL will provide transport home. CM/SS will follow throughout her stay.  Electronically signed by [place] RN on 6/28/2021 at 2:56 PM

## 2021-06-28 NOTE — PROGRESS NOTES
GENERAL SURGERY  DAILY PROGRESS NOTE  6/28/2021    Subjective:  S/p bowel resection yesterday for small bowel ischemia. Doing well in ICU. NGT. On IVF. Objective:  BP (!) 106/52   Pulse 99   Temp 98.9 °F (37.2 °C) (Axillary)   Resp 15   Ht 5' (1.524 m)   Wt 135 lb (61.2 kg)   SpO2 91%   BMI 26.37 kg/m²     GENERAL:  Laying in bed, awake, alert, cooperative, no apparent distress  HEAD: Normocephalic, atraumatic, NGT w/ bilious output  EYES: No sclera icterus, pupils equal  LUNGS:  No increased work of breathing, on NC  CARDIOVASCULAR:  A line in place w/ adequate pressures on monitor  ABDOMEN:  Midline incision is dressed w/ c/d/i dressing, TTP throughout w/o rebound or guarding  EXTREMITIES: No edema or swelling  SKIN: Warm and dry    Assessment/Plan:  80 y.o. female s/p resxn of 180cm nonviable bowel up to 15cm of ICV. Doing well.       Pain control  NPO IVF continue NG  AROBF  Ok to [employer] x2 IV then can dc central line  Ambulate as able - PT/OT    Electronically signed by Love Amaya MD on 6/28/2021 at 7:09 AM      As above  Clamp NG  Clears  [id number] out of ICU if ok with others  Out of bed  Torres out once out of bed likely tomorrow- cont to monitor fluid shifts  Nahed out  Cont Central line today for blood draws as necessary  Await return bowel function    Sumanth Celis MD, MS  Minimally Invasive and Bariatric Surgery  [phone] (p)  6/28/2021  8:09 AM

## 2021-06-28 NOTE — PROGRESS NOTES
bruising. Hematologic/Lymphatic:  Denies bruising or bleeding. Physical Exam:  No intake/output data recorded. Intake/Output Summary (Last 24 hours) at 6/28/2021 1508  Last data filed at 6/28/2021 1459  Gross per 24 hour   Intake 4390 ml   Output 1075 ml   Net 3315 ml   I/O last 3 completed shifts: In: 1079 [P.O.:240; I.V.:2836; IV Piggyback:1314]  Out: 18 [Urine:800; Emesis/NG output:275]  Patient Vitals for the past 96 hrs (Last 3 readings):   Weight   06/26/21 1742 135 lb (61.2 kg)     Vital Signs:   Blood pressure 129/74, pulse 89, temperature 98.4 °F (36.9 °C), temperature source Oral, resp. rate 23, height 5' (1.524 m), weight 135 lb (61.2 kg), SpO2 97 %. General appearance:  Awake and alert. Very polite and pleasant. Head:  Normocephalic. No masses, lesions or tenderness. Eyes:  PERRLA. EOMI. Sclera clear. Buccal mucosa moist.  ENT:  Ears normal. Mucosa normal.  NG tube is in place. Neck:    Supple. Trachea midline. No thyromegaly. No JVD. No bruits. Heart:    Atrial fibrillation with intermittent rate control. Lungs:    Better aeration throughout. Abdomen:   Postoperative dressings are in place. Bowel sounds remain hypoactive. Pain to palpation diffusely with voluntary guarding elicited. Extremities:    Peripheral pulses present. No peripheral edema. No ulcers. No cyanosis. No clubbing. Neurologic:    Alert x 3. No focal deficit. Cranial nerves grossly intact. No focal weakness. Psych:   Behavior is normal. Mood appears normal. Speech is not rapid and/or pressured. Musculoskeletal:   Spine ROM normal. Muscular strength intact. Gait not assessed. Integumentary:  No rashes  Skin normal color and texture. Genitalia/Breast:  Voiding with use of a Torres catheter.     Medication:  Scheduled Meds:   sodium chloride flush  10 mL Intravenous 2 times per day    pantoprazole  40 mg Intravenous Daily    And    sodium chloride (PF)  10 mL Intravenous Daily    enoxaparin  40 mg Subcutaneous Daily    piperacillin-tazobactam  3,375 mg Intravenous Q8H    fluconazole  200 mg Intravenous Q24H     Continuous Infusions:   dilTIAZem 10 mg/hr (06/28/21 1308)    lactated ringers 125 mL/hr at 06/28/21 0753    sodium chloride      sodium chloride 25 mL (06/28/21 1233)       Objective Data:  CBC with Differential:    Lab Results   Component Value Date    WBC 14.2 06/28/2021    RBC 3.67 06/28/2021    HGB 11.5 06/28/2021    HCT 34.5 06/28/2021     06/28/2021    MCV 94.0 06/28/2021    MCH 31.3 06/28/2021    MCHC 33.3 06/28/2021    RDW 12.9 06/28/2021    LYMPHOPCT 7.0 06/28/2021    MONOPCT 5.2 06/28/2021    BASOPCT 0.1 06/28/2021    MONOSABS 0.71 06/28/2021    LYMPHSABS 0.99 06/28/2021    EOSABS 0.00 06/28/2021    BASOSABS 0.00 06/28/2021     BMP:    Lab Results   Component Value Date     06/28/2021    K 3.8 06/28/2021     06/28/2021    CO2 24 06/28/2021    BUN 28 06/28/2021    LABALBU 2.5 06/27/2021    CREATININE 0.9 06/28/2021    CALCIUM 7.9 06/28/2021    GFRAA >60 06/28/2021    LABGLOM 59 06/28/2021    GLUCOSE 147 06/28/2021       Assessment:  1. Severe sepsis secondary to ischemic small bowel from an internal hernia status post exploratory laparotomy  2. Postoperative respiratory failure with hypoxia status post extubation  3. New onset atrial fibrillation with rapid ventricular response    Plan: To complicate matters further, the patient transitioned into new onset atrial fibrillation with rapid ventricular response. Therapeutic Lovenox has been instituted and a Cardizem drip has been started. NG tube remains in place with expected postoperative abdominal examination. The patient's respiratory status remained stable and we encourage the use of incentive spirometer. We also encouraged early ambulation. Continue current therapy. See orders for further plan of care. Greater than 40 minutes of critical care time was spent with the patient.   This time included chart review, , and discussion with those consultants involved in the patient's care. More than 50% of my  time was spent at the bedside counseling/coordinating care with the patient and/or family with face to face contact. This time was spent reviewing notes and laboratory data as well as instructing and counseling the patient. Time I spent with the family or surrogate(s) is included only if the patient was incapable of providing the necessary information or participating in medical decisions. I also discussed the differential diagnosis and all of the proposed management plans with the patient and individuals accompanying the patient. Mayola Mohs requires this high level of physician care and nursing in the ICU due to the complexity of decision management and chance of rapid decline or death. Continued cardiac monitoring and higher level of nursing are required. I am readily available for any further decision-making and intervention.      Nicole Salinas DO, ANEESH.C.O.I.  6/28/2021  3:08 PM

## 2021-06-28 NOTE — PROGRESS NOTES
Comprehensive Nutrition Assessment    Type and Reason for Visit:  Initial, RD Nutrition Re-Screen/LOS    Nutrition Recommendations/Plan: Recommend diet advancement as medically appropriate. Will start Ensure Clear BID and Carlos BID to supplement intake. Pt declined Gelatein d/t difficulty w/ jello w/ NGT in place. Nutrition Assessment:  Pt admitted w/ small bowel ischemia, s/p ex lap w/ SBR. Pt reported good intake typically but poor intake for past 4 days. Will start ONS to supplement intake and monitor while admitted. Malnutrition Assessment:  Malnutrition Status: At risk for malnutrition (Comment)    Context:  Acute Illness     Findings of the 6 clinical characteristics of malnutrition:  Energy Intake:  Mild decrease in energy intake (Comment)  Weight Loss:  Unable to assess (d/t lack of EMR wt hx)     Body Fat Loss:  No significant body fat loss     Muscle Mass Loss:  No significant muscle mass loss    Fluid Accumulation:  No significant fluid accumulation     Strength:  Not Performed    Estimated Daily Nutrient Needs:  Energy (kcal):  0603-2100 (MSJ REE= 1025 x 1.2); Weight Used for Energy Requirements:  Current     Protein (g):  60-70 (1.3-1.5 gm/kg IBW); Weight Used for Protein Requirements:  Ideal        Fluid (ml/day):  3510-7363 or per critical care; Method Used for Fluid Requirements:  1 ml/kcal      Nutrition Related Findings:  Pt A/Ox4, abd soft, tinkling BS, +2.6L I/O, no edema      Wounds:  Surgical Incision       Current Nutrition Therapies:    ADULT DIET; Clear Liquid    Anthropometric Measures:  · Height: 5' (152.4 cm)  · Current Body Weight: 147 lb 4.8 oz (66.8 kg) (6/28 bed scale)   · Admission Body Weight: 135 lb (61.2 kg) (6/26 stated)    · Usual Body Weight:  (REVA d/t lack of EMR wt hx)     · Ideal Body Weight: 100 lbs; % Ideal Body Weight 147.3 %   · BMI: 28.8  · Adjusted Body Weight: No Adjustment    · BMI Categories: Overweight (BMI 25.0-29. 9)       Nutrition Diagnosis: · Inadequate oral intake related to altered GI structure (s/p SBR) as evidenced by intake 0-25%, NPO or clear liquid status due to medical condition    Nutrition Interventions:   Food and/or Nutrient Delivery:  Modify Current Diet, Start Oral Nutrition Supplement (Recommend diet advancement as medically appropriate. Will start Ensure Clear BID and Carlos BID to supplement intake. Pt declined Gelatein d/t difficulty w/ jello w/ NGT in place.)  Nutrition Education/Counseling:  Education not indicated   Coordination of Nutrition Care:  Continue to monitor while inpatient    Goals:  Nutrition Progression       Nutrition Monitoring and Evaluation:   Behavioral-Environmental Outcomes:  None Identified   Food/Nutrient Intake Outcomes:  Diet Advancement/Tolerance, Food and Nutrient Intake, Supplement Intake  Physical Signs/Symptoms Outcomes:  Biochemical Data, GI Status, Fluid Status or Edema, Hemodynamic Status, Nutrition Focused Physical Findings, Skin, Weight     Discharge Planning:     Too soon to determine     Electronically signed by Florentin Montanez RD, LD on 6/28/21 at 4:52 PM EDT    Contact: [phone]

## 2021-06-28 NOTE — PROGRESS NOTES
Problem:  CARE  Goal: Vital signs are medically acceptable  Outcome: Ongoing  Vital signs and assessments WNL. Goal: Thermoregulation maintained greater than 97/less than 99.4 Ax  Outcome: Ongoing  Vital signs and assessments WNL. Goal: Infant exhibits minimal/reduced signs of pain/discomfort  Outcome: Ongoing  NIPS scores less 3  Goal: Infant is maintained in safe environment  Outcome: Ongoing  Id Bracelet remain on infant and mom. Hugs patent and active  Goal: Baby is with Mother and family  Outcome: Ongoing  Infant has roomed in with mom this shift. Problem: Discharge Planning:  Goal: Discharged to appropriate level of care  Discharged to appropriate level of care   Outcome: Ongoing  Discharge not anticipated for today    Problem: Infant Care:  Goal: Will show no infection signs and symptoms  Will show no infection signs and symptoms   Outcome: Ongoing  Infant shows no signs or symptoms of infection. Problem: [place] Screening:  Goal: Serum bilirubin within specified parameters  Serum bilirubin within specified parameters   Outcome: Ongoing  TCB will be done pror discharge mom aware  Goal: Neurodevelopmental maturation within specified parameters  Neurodevelopmental maturation within specified parameters   Outcome: Ongoing  OAE will be done prior discharge mom aware. Goal: Circulatory function within specified parameters  Circulatory function within specified parameters   Outcome: Ongoing  CCHD will be one prior discharge mom aware. Problem: Nutritional:  Goal: Knowledge of adequate nutritional intake and output  Knowledge of adequate nutritional intake and output   Outcome: Ongoing  Mom aware to feed infant every 2 to 3 hours with feeding cues. See infant intake and output.   Goal: Knowledge of infant formula  Knowledge of infant formula   Outcome: Ongoing  See infant intake and output  Goal: Knowledge of infant feeding cues  Knowledge of infant feeding cues   Outcome: Completed Date Met: Pulmonary/Critical Care Progress Note    We are following patient for small bowel resection (180 cm) secondary to ischemia from internal herniation or adhesion of entrapped mid jejunal segment, new development of atrial fibrillation with rapid ventricular response, mild hypokalemia    SUBJECTIVE:  Patient was weaned and extubated without difficulty yesterday. She continues to do fairly well today on IV fluids but urine output was low overnight having begun to  a bit today. The patient then developed atrial fibrillation with rapid ventricular response and we have placed her on a low-dose diltiazem drip after a small 5 mg bolus in deference to her age. Normally, the patient is able to drive her own car and lives by herself, and an entirely independent lifestyle. For now, we will continue her diltiazem but will increase the drip to 10 mg/h and observe carefully. She will only require enoxaparin subcutaneously for now unless she remains in atrial fibrillation for the next 24 to 48 hours. We might want to give consideration to DC cardioversion tomorrow; alternatively, if diltiazem does not help with conversion, will change to IV amiodarone. In these situations, it is important to keep potassium levels above 4 so that we will give an additional 20 mEq IV today.     MEDICATIONS:   potassium chloride  20 mEq Intravenous Once    sodium chloride flush  10 mL Intravenous 2 times per day    pantoprazole  40 mg Intravenous Daily    And    sodium chloride (PF)  10 mL Intravenous Daily    enoxaparin  40 mg Subcutaneous Daily    piperacillin-tazobactam  3,375 mg Intravenous Q8H    fluconazole  200 mg Intravenous Q24H      dilTIAZem 5 mg/hr (06/28/21 1232)    lactated ringers 125 mL/hr at 06/28/21 0753    sodium chloride      sodium chloride 25 mL (06/28/21 1233)     sodium chloride flush, sodium chloride, morphine **OR** morphine, ondansetron **OR** ondansetron      REVIEW OF SYSTEMS:  Constitutional: 10/10/17  Mom aware of feeding cues are sucking, rooting or fussing prior a feed. Comments: Plan of care discussed with mother and she contributes to goal setting and voices understanding of plan of care. Denies fever, weight loss, night sweats, and fatigue  Skin: Denies pigmentation, dark lesions, and rashes   HEENT: Denies hearing loss, tinnitus, ear drainage, epistaxis, sore throat, and hoarseness. Cardiovascular: Denies palpitations, chest pain, and chest pressure. Respiratory: Denies cough, dyspnea at rest, hemoptysis, apnea, and choking. Gastrointestinal: Denies nausea, vomiting, poor appetite, diarrhea, heartburn or reflux. However, does complain of incisional pain. Genitourinary: Denies dysuria, frequency, urgency or hematuria  Musculoskeletal: Denies myalgias, muscle weakness, and bone pain  Neurological: Denies dizziness, vertigo, headache, and focal weakness  Psychological: Denies anxiety and depression  Endocrine: Denies heat intolerance and cold intolerance  Hematopoietic/Lymphatic: Denies bleeding problems and blood transfusions    OBJECTIVE:  Vitals:    06/28/21 1200   BP: 124/83   Pulse: 102   Resp: 13   Temp: 98.4 °F (36.9 °C)   SpO2:      FiO2 : 50 %  O2 Flow Rate (L/min): 2 L/min  O2 Device: Nasal cannula    PHYSICAL EXAM:  Constitutional: No fever, chills, diaphoresis  Skin: No skin rash, no skin breakdown  HEENT: Unremarkable  Neck: JVD, lymphadenopathy, thyromegaly  Cardiovascular: S1, S2 irregular. No S3 murmurs rubs present  Respiratory: Clear to auscultation bilaterally  Gastrointestinal: Soft but tender to incisional palpation. Sounds are present  Genitourinary: No CVA tenderness  Extremities: No clubbing, cyanosis, or edema  Neurological: Awake, alert, oriented x3. No evidence of focal motor or sensory deficits  Psychological: Appropriate affect.   In relatively good spirits    LABS:  WBC   Date Value Ref Range Status   06/28/2021 14.2 (H) 4.5 - 11.5 E9/L Final   06/27/2021 15.1 (H) 4.5 - 11.5 E9/L Final   06/27/2021 18.5 (H) 4.5 - 11.5 E9/L Final     Hemoglobin   Date Value Ref Range Status   06/28/2021 11.5 11.5 - 15.5 g/dL Final   06/27/2021 15.0 11.5 - 15.5 g/dL Final 06/27/2021 17.9 (H) 11.5 - 15.5 g/dL Final     Hematocrit   Date Value Ref Range Status   06/28/2021 34.5 34.0 - 48.0 % Final   06/27/2021 43.4 34.0 - 48.0 % Final   06/27/2021 55.6 (H) 34.0 - 48.0 % Final     MCV   Date Value Ref Range Status   06/28/2021 94.0 80.0 - 99.9 fL Final   06/27/2021 91.8 80.0 - 99.9 fL Final   06/27/2021 96.4 80.0 - 99.9 fL Final     Platelets   Date Value Ref Range Status   06/28/2021 159 130 - 450 E9/L Final   06/27/2021 227 130 - 450 E9/L Final   06/27/2021 320 130 - 450 E9/L Final     Sodium   Date Value Ref Range Status   06/28/2021 136 132 - 146 mmol/L Final   06/27/2021 133 132 - 146 mmol/L Final   06/27/2021 132 132 - 146 mmol/L Final     Potassium reflex Magnesium   Date Value Ref Range Status   06/28/2021 3.8 3.5 - 5.0 mmol/L Final   06/27/2021 3.9 3.5 - 5.0 mmol/L Final   06/27/2021 3.9 3.5 - 5.0 mmol/L Final     Chloride   Date Value Ref Range Status   06/28/2021 106 98 - 107 mmol/L Final   06/27/2021 105 98 - 107 mmol/L Final   06/27/2021 100 98 - 107 mmol/L Final     CO2   Date Value Ref Range Status   06/28/2021 24 22 - 29 mmol/L Final   06/27/2021 16 (L) 22 - 29 mmol/L Final   06/27/2021 15 (L) 22 - 29 mmol/L Final     BUN   Date Value Ref Range Status   06/28/2021 28 (H) 6 - 23 mg/dL Final   06/27/2021 22 6 - 23 mg/dL Final   06/27/2021 20 6 - 23 mg/dL Final     CREATININE   Date Value Ref Range Status   06/28/2021 0.9 0.5 - 1.0 mg/dL Final   06/27/2021 0.9 0.5 - 1.0 mg/dL Final   06/27/2021 1.2 (H) 0.5 - 1.0 mg/dL Final     Glucose   Date Value Ref Range Status   06/28/2021 147 (H) 74 - 99 mg/dL Final   06/27/2021 170 (H) 74 - 99 mg/dL Final   06/27/2021 249 (H) 74 - 99 mg/dL Final     Calcium   Date Value Ref Range Status   06/28/2021 7.9 (L) 8.6 - 10.2 mg/dL Final   06/27/2021 8.3 (L) 8.6 - 10.2 mg/dL Final   06/27/2021 9.1 8.6 - 10.2 mg/dL Final     Total Protein   Date Value Ref Range Status   06/27/2021 4.0 (L) 6.4 - 8.3 g/dL Final   06/26/2021 7.7 6.4 - 8.3 g/dL Final     Albumin   Date Value Ref Range Status   06/27/2021 2.5 (L) 3.5 - 5.2 g/dL Final   06/26/2021 4.8 3.5 - 5.2 g/dL Final     Total Bilirubin   Date Value Ref Range Status   06/27/2021 0.3 0.0 - 1.2 mg/dL Final   06/26/2021 0.6 0.0 - 1.2 mg/dL Final     Alkaline Phosphatase   Date Value Ref Range Status   06/27/2021 74 35 - 104 U/L Final   06/26/2021 112 (H) 35 - 104 U/L Final     AST   Date Value Ref Range Status   06/27/2021 14 0 - 31 U/L Final   06/26/2021 22 0 - 31 U/L Final     ALT   Date Value Ref Range Status   06/27/2021 10 0 - 32 U/L Final   06/26/2021 17 0 - 32 U/L Final     GFR Non-   Date Value Ref Range Status   06/28/2021 59 >=60 mL/min/1.73 Final     Comment:     Chronic Kidney Disease: less than 60 ml/min/1.73 sq.m. Kidney Failure: less than 15 ml/min/1.73 sq.m. Results valid for patients 18 years and older. 06/27/2021 59 >=60 mL/min/1.73 Final     Comment:     Chronic Kidney Disease: less than 60 ml/min/1.73 sq.m. Kidney Failure: less than 15 ml/min/1.73 sq.m. Results valid for patients 18 years and older. 06/27/2021 42 >=60 mL/min/1.73 Final     Comment:     Chronic Kidney Disease: less than 60 ml/min/1.73 sq.m. Kidney Failure: less than 15 ml/min/1.73 sq.m. Results valid for patients 18 years and older. GFR    Date Value Ref Range Status   06/28/2021 >60  Final   06/27/2021 >60  Final   06/27/2021 51  Final     No results found for: MG  Phosphorus   Date Value Ref Range Status   06/28/2021 4.1 2.5 - 4.5 mg/dL Final     No results for input(s): PH, PO2, PCO2, HCO3, BE, O2SAT in the last 72 hours. RADIOLOGY:  XR CHEST PORTABLE   Final Result   Endotracheal tube in good position. Left subclavian central venous catheter tip in the SVC. NG tube in the stomach. No pneumothorax on the right on the left. No acute cardiopulmonary process.          XR ABDOMEN FOR NG/OG/NE TUBE PLACEMENT   Final Result 1. Satisfactory position of the NG tube within stomach. CT ABDOMEN PELVIS W IV CONTRAST Additional Contrast? None   Final Result   Findings for segmental small bowel ischemia of entrapped mid jejunal segment   likely due internal herniation/adhesions, forming a closed loop   syndrome-likely with vascular compromise including severe mesenteric edema   and thickening of bowel wall. Preliminary report given to Dr. Dany Nation physician in [facility] at the time of the interpretation. XR CHEST PORTABLE    (Results Pending)           PROBLEM LIST:  Active Problems:    Small bowel ischemia (HCC)  Resolved Problems:    * No resolved hospital problems. *      IMPRESSION:  1. 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 Status post post operative respiratory failure, with patient now extubated  3. Mild hypokalemia  4. Hypovolemia, resolving  5. New onset atrial fibrillation with rapid ventricular response    PLAN:  1. Continue IV fluids  2. Potassium supplement  3. Initiate Cardizem bolus followed by drip at 10 mg/h  4. Maintain Lovenox at DVT prophylactic doses for now  5. Incentive spirometry every hour while awake  6. Continue IV Zosyn and fluconazole  7. Try to mobilize in bed and out of bed    ATTESTATION:  ICU Staff Physician note of personal involvement in Care  As the attending physician, I certify that I personally reviewed the patients history and personally examined the patient to confirm the physical findings described above,  And that I reviewed the relevant imaging studies and available reports. I also discussed the differential diagnosis and all of the proposed management plans with the patient and individuals accompanying the patient to this visit.   They had the opportunity to ask questions about the proposed management plans and to have those questions answered. This patient has a high probability of sudden, clinically significant deterioration, which requires the highest level of physician preparedness to intervene urgently. I managed/supervised life or organ supporting interventions that required frequent physician assessment. I devoted my full attention to the direct care of this patient for the amount of time indicated below. Time I spent with the family or surrogate(s) is included only if the patient was incapable of providing the necessary information or participating in medical decisions - Time devoted to teaching and to any procedures I billed separately is not included.     CRITICAL CARE TIME:  35 minutes    Electronically signed by Lata Dsouza MD on 6/28/2021 at 12:54 PM

## 2021-06-29 ENCOUNTER — APPOINTMENT (OUTPATIENT)
Dept: GENERAL RADIOLOGY | Age: 86
DRG: 853 | End: 2021-06-29
Payer: MEDICARE

## 2021-06-29 LAB
ALBUMIN SERPL-MCNC: 2.4 G/DL (ref 3.5–5.2)
ALP BLD-CCNC: 61 U/L (ref 35–104)
ALT SERPL-CCNC: 10 U/L (ref 0–32)
ANION GAP SERPL CALCULATED.3IONS-SCNC: 8 MMOL/L (ref 7–16)
ANION GAP SERPL CALCULATED.3IONS-SCNC: 8 MMOL/L (ref 7–16)
AST SERPL-CCNC: 24 U/L (ref 0–31)
BASOPHILS ABSOLUTE: 0.01 E9/L (ref 0–0.2)
BASOPHILS RELATIVE PERCENT: 0.1 % (ref 0–2)
BILIRUB SERPL-MCNC: 0.3 MG/DL (ref 0–1.2)
BUN BLDV-MCNC: 12 MG/DL (ref 6–23)
BUN BLDV-MCNC: 9 MG/DL (ref 6–23)
CALCIUM SERPL-MCNC: 7.8 MG/DL (ref 8.6–10.2)
CALCIUM SERPL-MCNC: 8.1 MG/DL (ref 8.6–10.2)
CHLORIDE BLD-SCNC: 102 MMOL/L (ref 98–107)
CHLORIDE BLD-SCNC: 98 MMOL/L (ref 98–107)
CO2: 25 MMOL/L (ref 22–29)
CO2: 25 MMOL/L (ref 22–29)
CREAT SERPL-MCNC: 0.5 MG/DL (ref 0.5–1)
CREAT SERPL-MCNC: 0.5 MG/DL (ref 0.5–1)
EKG ATRIAL RATE: 500 BPM
EKG Q-T INTERVAL: 350 MS
EKG QRS DURATION: 80 MS
EKG QTC CALCULATION (BAZETT): 442 MS
EKG R AXIS: -8 DEGREES
EKG T AXIS: 49 DEGREES
EKG VENTRICULAR RATE: 96 BPM
EOSINOPHILS ABSOLUTE: 0.01 E9/L (ref 0.05–0.5)
EOSINOPHILS RELATIVE PERCENT: 0.1 % (ref 0–6)
GFR AFRICAN AMERICAN: >60
GFR AFRICAN AMERICAN: >60
GFR NON-AFRICAN AMERICAN: >60 ML/MIN/1.73
GFR NON-AFRICAN AMERICAN: >60 ML/MIN/1.73
GLUCOSE BLD-MCNC: 125 MG/DL (ref 74–99)
GLUCOSE BLD-MCNC: 134 MG/DL (ref 74–99)
HCT VFR BLD CALC: 29.2 % (ref 34–48)
HEMOGLOBIN: 9.7 G/DL (ref 11.5–15.5)
IMMATURE GRANULOCYTES #: 0.06 E9/L
IMMATURE GRANULOCYTES %: 0.5 % (ref 0–5)
LYMPHOCYTES ABSOLUTE: 1.22 E9/L (ref 1.5–4)
LYMPHOCYTES RELATIVE PERCENT: 10.6 % (ref 20–42)
MAGNESIUM: 1.7 MG/DL (ref 1.6–2.6)
MAGNESIUM: 1.9 MG/DL (ref 1.6–2.6)
MCH RBC QN AUTO: 31.8 PG (ref 26–35)
MCHC RBC AUTO-ENTMCNC: 33.2 % (ref 32–34.5)
MCV RBC AUTO: 95.7 FL (ref 80–99.9)
MONOCYTES ABSOLUTE: 0.98 E9/L (ref 0.1–0.95)
MONOCYTES RELATIVE PERCENT: 8.5 % (ref 2–12)
NEUTROPHILS ABSOLUTE: 9.2 E9/L (ref 1.8–7.3)
NEUTROPHILS RELATIVE PERCENT: 80.2 % (ref 43–80)
PDW BLD-RTO: 12.7 FL (ref 11.5–15)
PHOSPHORUS: 1.6 MG/DL (ref 2.5–4.5)
PHOSPHORUS: 2.8 MG/DL (ref 2.5–4.5)
PLATELET # BLD: 141 E9/L (ref 130–450)
PMV BLD AUTO: 10.2 FL (ref 7–12)
POTASSIUM SERPL-SCNC: 3.7 MMOL/L (ref 3.5–5)
POTASSIUM SERPL-SCNC: 3.7 MMOL/L (ref 3.5–5)
RBC # BLD: 3.05 E12/L (ref 3.5–5.5)
SODIUM BLD-SCNC: 131 MMOL/L (ref 132–146)
SODIUM BLD-SCNC: 135 MMOL/L (ref 132–146)
TOTAL PROTEIN: 4.4 G/DL (ref 6.4–8.3)
WBC # BLD: 11.5 E9/L (ref 4.5–11.5)

## 2021-06-29 PROCEDURE — 2000000000 HC ICU R&B

## 2021-06-29 PROCEDURE — 93005 ELECTROCARDIOGRAM TRACING: CPT | Performed by: INTERNAL MEDICINE

## 2021-06-29 PROCEDURE — 6360000002 HC RX W HCPCS: Performed by: SURGERY

## 2021-06-29 PROCEDURE — 83735 ASSAY OF MAGNESIUM: CPT

## 2021-06-29 PROCEDURE — 87449 NOS EACH ORGANISM AG IA: CPT

## 2021-06-29 PROCEDURE — 2580000003 HC RX 258: Performed by: SURGERY

## 2021-06-29 PROCEDURE — 84100 ASSAY OF PHOSPHORUS: CPT

## 2021-06-29 PROCEDURE — 2580000003 HC RX 258: Performed by: INTERNAL MEDICINE

## 2021-06-29 PROCEDURE — 87324 CLOSTRIDIUM AG IA: CPT

## 2021-06-29 PROCEDURE — 2500000003 HC RX 250 WO HCPCS: Performed by: INTERNAL MEDICINE

## 2021-06-29 PROCEDURE — 6370000000 HC RX 637 (ALT 250 FOR IP): Performed by: INTERNAL MEDICINE

## 2021-06-29 PROCEDURE — 6370000000 HC RX 637 (ALT 250 FOR IP): Performed by: SURGERY

## 2021-06-29 PROCEDURE — 6360000002 HC RX W HCPCS: Performed by: INTERNAL MEDICINE

## 2021-06-29 PROCEDURE — 93308 TTE F-UP OR LMTD: CPT

## 2021-06-29 PROCEDURE — C9113 INJ PANTOPRAZOLE SODIUM, VIA: HCPCS | Performed by: SURGERY

## 2021-06-29 PROCEDURE — 71045 X-RAY EXAM CHEST 1 VIEW: CPT

## 2021-06-29 PROCEDURE — 85025 COMPLETE CBC W/AUTO DIFF WBC: CPT

## 2021-06-29 PROCEDURE — 80048 BASIC METABOLIC PNL TOTAL CA: CPT

## 2021-06-29 PROCEDURE — 36592 COLLECT BLOOD FROM PICC: CPT

## 2021-06-29 PROCEDURE — 80053 COMPREHEN METABOLIC PANEL: CPT

## 2021-06-29 RX ORDER — DOCUSATE SODIUM 100 MG/1
100 CAPSULE, LIQUID FILLED ORAL DAILY
Status: DISCONTINUED | OUTPATIENT
Start: 2021-06-29 | End: 2021-06-30

## 2021-06-29 RX ORDER — TRAMADOL HYDROCHLORIDE 50 MG/1
50 TABLET ORAL EVERY 6 HOURS PRN
Status: DISCONTINUED | OUTPATIENT
Start: 2021-06-29 | End: 2021-07-02 | Stop reason: HOSPADM

## 2021-06-29 RX ORDER — MAGNESIUM SULFATE 1 G/100ML
1000 INJECTION INTRAVENOUS ONCE
Status: COMPLETED | OUTPATIENT
Start: 2021-06-29 | End: 2021-06-29

## 2021-06-29 RX ORDER — SODIUM CHLORIDE, SODIUM LACTATE, POTASSIUM CHLORIDE, CALCIUM CHLORIDE 600; 310; 30; 20 MG/100ML; MG/100ML; MG/100ML; MG/100ML
INJECTION, SOLUTION INTRAVENOUS CONTINUOUS
Status: DISCONTINUED | OUTPATIENT
Start: 2021-06-29 | End: 2021-06-29

## 2021-06-29 RX ORDER — SENNA PLUS 8.6 MG/1
1 TABLET ORAL NIGHTLY
Status: DISCONTINUED | OUTPATIENT
Start: 2021-06-29 | End: 2021-06-30

## 2021-06-29 RX ADMIN — SODIUM CHLORIDE 25 ML: 9 INJECTION, SOLUTION INTRAVENOUS at 12:37

## 2021-06-29 RX ADMIN — DOCUSATE SODIUM 100 MG: 100 CAPSULE, LIQUID FILLED ORAL at 08:48

## 2021-06-29 RX ADMIN — TRAMADOL HYDROCHLORIDE 50 MG: 50 TABLET ORAL at 10:05

## 2021-06-29 RX ADMIN — PANTOPRAZOLE SODIUM 40 MG: 40 INJECTION, POWDER, FOR SOLUTION INTRAVENOUS at 08:16

## 2021-06-29 RX ADMIN — Medication 10 ML: at 20:33

## 2021-06-29 RX ADMIN — SODIUM CHLORIDE, PRESERVATIVE FREE 10 ML: 5 INJECTION INTRAVENOUS at 08:18

## 2021-06-29 RX ADMIN — DICLOFENAC SODIUM 2 G: 10 GEL TOPICAL at 08:35

## 2021-06-29 RX ADMIN — Medication 10 ML: at 08:17

## 2021-06-29 RX ADMIN — PIPERACILLIN SODIUM AND TAZOBACTAM SODIUM 3375 MG: 3; .375 INJECTION, POWDER, LYOPHILIZED, FOR SOLUTION INTRAVENOUS at 08:16

## 2021-06-29 RX ADMIN — FLUCONAZOLE IN SODIUM CHLORIDE 200 MG: 2 INJECTION, SOLUTION INTRAVENOUS at 15:15

## 2021-06-29 RX ADMIN — SODIUM CHLORIDE, POTASSIUM CHLORIDE, SODIUM LACTATE AND CALCIUM CHLORIDE: 600; 310; 30; 20 INJECTION, SOLUTION INTRAVENOUS at 20:43

## 2021-06-29 RX ADMIN — POTASSIUM PHOSPHATE, MONOBASIC AND POTASSIUM PHOSPHATE, DIBASIC 30 MMOL: 224; 236 INJECTION, SOLUTION, CONCENTRATE INTRAVENOUS at 14:26

## 2021-06-29 RX ADMIN — MAGNESIUM SULFATE HEPTAHYDRATE 1000 MG: 1 INJECTION, SOLUTION INTRAVENOUS at 12:34

## 2021-06-29 RX ADMIN — ENOXAPARIN SODIUM 40 MG: 40 INJECTION SUBCUTANEOUS at 08:16

## 2021-06-29 RX ADMIN — ENOXAPARIN SODIUM 60 MG: 60 INJECTION SUBCUTANEOUS at 20:33

## 2021-06-29 RX ADMIN — SODIUM CHLORIDE, POTASSIUM CHLORIDE, SODIUM LACTATE AND CALCIUM CHLORIDE: 600; 310; 30; 20 INJECTION, SOLUTION INTRAVENOUS at 09:35

## 2021-06-29 ASSESSMENT — PAIN SCALES - GENERAL
PAINLEVEL_OUTOF10: 0
PAINLEVEL_OUTOF10: 6
PAINLEVEL_OUTOF10: 0
PAINLEVEL_OUTOF10: 2
PAINLEVEL_OUTOF10: 0

## 2021-06-29 ASSESSMENT — PAIN DESCRIPTION - PAIN TYPE: TYPE: ACUTE PAIN

## 2021-06-29 ASSESSMENT — PAIN DESCRIPTION - FREQUENCY: FREQUENCY: INTERMITTENT

## 2021-06-29 ASSESSMENT — PAIN DESCRIPTION - LOCATION: LOCATION: ABDOMEN

## 2021-06-29 ASSESSMENT — PAIN DESCRIPTION - ONSET: ONSET: ON-GOING

## 2021-06-29 ASSESSMENT — PAIN DESCRIPTION - DESCRIPTORS: DESCRIPTORS: SORE;DISCOMFORT

## 2021-06-29 ASSESSMENT — PAIN DESCRIPTION - ORIENTATION: ORIENTATION: MID

## 2021-06-29 NOTE — PROGRESS NOTES
Pulmonary/Critical Care Progress Note    We are following patient for small bowel resection for ischemia secondary to internal hernia (180 cm removed), atrial fibrillation with controlled ventricular response, mild hypokalemia, hypophosphatemia, borderline hypomagnesemia    SUBJECTIVE:  Patient is awake alert and feeling better. She is still in atrial fibrillation but the ventricular response is much better controlled. We will await conversion to sinus rhythm hopefully and maintain her on the current dose of diltiazem. Patient is passing some gas but her abdomen is still slightly distended. We will keep her diet to clear liquids for now. In addition, she will need replacement of electrolytes including potassium, phosphorus, and magnesium. She is up in a chair and is otherwise doing nicely. We will hold off on amiodarone for now and obtain echocardiogram today. MEDICATIONS:   docusate sodium  100 mg Oral Daily    senna  1 tablet Oral Nightly    sodium chloride flush  10 mL Intravenous 2 times per day    pantoprazole  40 mg Intravenous Daily    And    sodium chloride (PF)  10 mL Intravenous Daily    enoxaparin  40 mg Subcutaneous Daily    piperacillin-tazobactam  3,375 mg Intravenous Q8H    fluconazole  200 mg Intravenous Q24H      dilTIAZem 10 mg/hr (06/29/21 0800)    lactated ringers 125 mL/hr at 06/29/21 0935    sodium chloride      sodium chloride Stopped (06/29/21 0547)     diclofenac sodium, traMADol, sodium chloride flush, sodium chloride, morphine **OR** morphine, ondansetron **OR** ondansetron      REVIEW OF SYSTEMS:  Constitutional: Denies fever, weight loss, night sweats, and fatigue  Skin: Denies pigmentation, dark lesions, and rashes   HEENT: Denies hearing loss, tinnitus, ear drainage, epistaxis, sore throat, and hoarseness. Cardiovascular: Denies palpitations, chest pain, and chest pressure.   Respiratory: Denies cough, dyspnea at rest, hemoptysis, apnea, and choking. Gastrointestinal: Denies nausea, vomiting, poor appetite, diarrhea, heartburn or reflux  Genitourinary: Denies dysuria, frequency, urgency or hematuria  Musculoskeletal: Denies myalgias, muscle weakness, and bone pain  Neurological: Denies dizziness, vertigo, headache, and focal weakness  Psychological: Denies anxiety and depression  Endocrine: Denies heat intolerance and cold intolerance  Hematopoietic/Lymphatic: Denies bleeding problems and blood transfusions    OBJECTIVE:  Vitals:    06/29/21 1000   BP: (!) 111/52   Pulse: 87   Resp: 22   Temp:    SpO2:      FiO2 : 50 %  O2 Flow Rate (L/min): 3 L/min  O2 Device: Nasal cannula    PHYSICAL EXAM:  Constitutional: No fever, chills, diaphoresis  Skin: Skin rash, no skin breakdown  HEENT: Unremarkable  Neck: No JVD, lymphadenopathy, thyromegaly  Cardiovascular: S1, S2 slightly irregular still, no S3-S4 murmurs or rubs  Respiratory: Clear to auscultation bilaterally except for minimal crackles during inspiration both posterior lung bases  Gastrointestinal: Minimal distention, soft, incisional pain to palpation  Genitourinary: No CVA tenderness  Extremities: Clubbing, cyanosis, or edema  Neurological: Awake, alert, oriented x3. No evidence of focal motor or sensory deficits  Psychological: Appropriate affect.   In good spirits    LABS:  WBC   Date Value Ref Range Status   06/29/2021 11.5 4.5 - 11.5 E9/L Final   06/28/2021 14.2 (H) 4.5 - 11.5 E9/L Final   06/27/2021 15.1 (H) 4.5 - 11.5 E9/L Final     Hemoglobin   Date Value Ref Range Status   06/29/2021 9.7 (L) 11.5 - 15.5 g/dL Final   06/28/2021 11.5 11.5 - 15.5 g/dL Final   06/27/2021 15.0 11.5 - 15.5 g/dL Final     Hematocrit   Date Value Ref Range Status   06/29/2021 29.2 (L) 34.0 - 48.0 % Final   06/28/2021 34.5 34.0 - 48.0 % Final   06/27/2021 43.4 34.0 - 48.0 % Final     MCV   Date Value Ref Range Status   06/29/2021 95.7 80.0 - 99.9 fL Final   06/28/2021 94.0 80.0 - 99.9 fL Final   06/27/2021 91.8 80.0 - 99.9 fL Final     Platelets   Date Value Ref Range Status   06/29/2021 141 130 - 450 E9/L Final   06/28/2021 159 130 - 450 E9/L Final   06/27/2021 227 130 - 450 E9/L Final     Sodium   Date Value Ref Range Status   06/29/2021 135 132 - 146 mmol/L Final   06/28/2021 136 132 - 146 mmol/L Final   06/27/2021 133 132 - 146 mmol/L Final     Potassium   Date Value Ref Range Status   06/29/2021 3.7 3.5 - 5.0 mmol/L Final     Potassium reflex Magnesium   Date Value Ref Range Status   06/28/2021 3.8 3.5 - 5.0 mmol/L Final   06/27/2021 3.9 3.5 - 5.0 mmol/L Final   06/27/2021 3.9 3.5 - 5.0 mmol/L Final     Chloride   Date Value Ref Range Status   06/29/2021 102 98 - 107 mmol/L Final   06/28/2021 106 98 - 107 mmol/L Final   06/27/2021 105 98 - 107 mmol/L Final     CO2   Date Value Ref Range Status   06/29/2021 25 22 - 29 mmol/L Final   06/28/2021 24 22 - 29 mmol/L Final   06/27/2021 16 (L) 22 - 29 mmol/L Final     BUN   Date Value Ref Range Status   06/29/2021 12 6 - 23 mg/dL Final   06/28/2021 28 (H) 6 - 23 mg/dL Final   06/27/2021 22 6 - 23 mg/dL Final     CREATININE   Date Value Ref Range Status   06/29/2021 0.5 0.5 - 1.0 mg/dL Final   06/28/2021 0.9 0.5 - 1.0 mg/dL Final   06/27/2021 0.9 0.5 - 1.0 mg/dL Final     Glucose   Date Value Ref Range Status   06/29/2021 125 (H) 74 - 99 mg/dL Final   06/28/2021 147 (H) 74 - 99 mg/dL Final   06/27/2021 170 (H) 74 - 99 mg/dL Final     Calcium   Date Value Ref Range Status   06/29/2021 7.8 (L) 8.6 - 10.2 mg/dL Final   06/28/2021 7.9 (L) 8.6 - 10.2 mg/dL Final   06/27/2021 8.3 (L) 8.6 - 10.2 mg/dL Final     Total Protein   Date Value Ref Range Status   06/29/2021 4.4 (L) 6.4 - 8.3 g/dL Final   06/27/2021 4.0 (L) 6.4 - 8.3 g/dL Final   06/26/2021 7.7 6.4 - 8.3 g/dL Final     Albumin   Date Value Ref Range Status   06/29/2021 2.4 (L) 3.5 - 5.2 g/dL Final   06/27/2021 2.5 (L) 3.5 - 5.2 g/dL Final   06/26/2021 4.8 3.5 - 5.2 g/dL Final     Total Bilirubin   Date Value Ref Range Status 06/29/2021 0.3 0.0 - 1.2 mg/dL Final   06/27/2021 0.3 0.0 - 1.2 mg/dL Final   06/26/2021 0.6 0.0 - 1.2 mg/dL Final     Alkaline Phosphatase   Date Value Ref Range Status   06/29/2021 61 35 - 104 U/L Final   06/27/2021 74 35 - 104 U/L Final   06/26/2021 112 (H) 35 - 104 U/L Final     AST   Date Value Ref Range Status   06/29/2021 24 0 - 31 U/L Final     Comment:     Specimen is slightly Hemolyzed. Result may be artificially increased. 06/27/2021 14 0 - 31 U/L Final   06/26/2021 22 0 - 31 U/L Final     ALT   Date Value Ref Range Status   06/29/2021 10 0 - 32 U/L Final   06/27/2021 10 0 - 32 U/L Final   06/26/2021 17 0 - 32 U/L Final     GFR Non-   Date Value Ref Range Status   06/29/2021 >60 >=60 mL/min/1.73 Final     Comment:     Chronic Kidney Disease: less than 60 ml/min/1.73 sq.m. Kidney Failure: less than 15 ml/min/1.73 sq.m. Results valid for patients 18 years and older. 06/28/2021 59 >=60 mL/min/1.73 Final     Comment:     Chronic Kidney Disease: less than 60 ml/min/1.73 sq.m. Kidney Failure: less than 15 ml/min/1.73 sq.m. Results valid for patients 18 years and older. 06/27/2021 59 >=60 mL/min/1.73 Final     Comment:     Chronic Kidney Disease: less than 60 ml/min/1.73 sq.m. Kidney Failure: less than 15 ml/min/1.73 sq.m. Results valid for patients 18 years and older. GFR    Date Value Ref Range Status   06/29/2021 >60  Final   06/28/2021 >60  Final   06/27/2021 >60  Final     Magnesium   Date Value Ref Range Status   06/29/2021 1.7 1.6 - 2.6 mg/dL Final     Phosphorus   Date Value Ref Range Status   06/29/2021 1.6 (L) 2.5 - 4.5 mg/dL Final   06/28/2021 4.1 2.5 - 4.5 mg/dL Final     No results for input(s): PH, PO2, PCO2, HCO3, BE, O2SAT in the last 72 hours. RADIOLOGY:  XR CHEST PORTABLE   Preliminary Result   Free air under the hemidiaphragms consistent with a recent surgical procedure. The lungs are clear.          XR CHEST PORTABLE   Final Result   Endotracheal tube in good position. Left subclavian central venous catheter tip in the SVC. NG tube in the stomach. No pneumothorax on the right on the left. No acute cardiopulmonary process. XR ABDOMEN FOR NG/OG/NE TUBE PLACEMENT   Final Result   1. Satisfactory position of the NG tube within stomach. CT ABDOMEN PELVIS W IV CONTRAST Additional Contrast? None   Final Result   Findings for segmental small bowel ischemia of entrapped mid jejunal segment   likely due internal herniation/adhesions, forming a closed loop   syndrome-likely with vascular compromise including severe mesenteric edema   and thickening of bowel wall. Preliminary report given to Dr. Simone Castañeda physician in [facility] at the time of the interpretation. PROBLEM LIST:  Active Problems:    Small bowel ischemia (HCC)  Resolved Problems:    * No resolved hospital problems. *      IMPRESSION:  1. Status post small bowel resection secondary to bowel ischemia from internal hernia with approximately 180 cc small bowel resected and end-to-end anastomosis performed  2. New onset atrial fibrillation with controlled response  3. Hypokalemia  4. Hypophosphatemia  5. Hypomagnesemia    PLAN:  1. Replete all electrolytes as noted above  2. Continue Cardizem drip at 10 mg/h for now  3. Increase Lovenox to 60 mg subcutaneously every 12 hours in the setting of normal renal function  4. Continue incentive spirometry  5. In chair as she is doing  6. Clear liquids for now  7. Decrease amount of IV fluids  8. Continue IV antibiotics for 1 more day    ATTESTATION:  ICU Staff Physician note of personal involvement in Care  As the attending physician, I certify that I personally reviewed the patients history and personally examined the patient to confirm the physical findings described above,  And that I reviewed the relevant imaging studies and available reports.   I

## 2021-06-29 NOTE — PROGRESS NOTES
Internal Medicine Progress Note    ROD=[facility]    [facility]. Radha Henderson, HASEEB Gandhi D.O., HASEEB Ramachandran D.O. Izzy Melo, MSN, APRN, NP-C  Jason Nation. Maureen Agudelo, MSN, APRN-CNP     Primary Care Physician: Marcia Hammond MD   Admitting Physician:  Chelsey Fletcher MD  Admission date and time: 6/26/2021  6:05 PM    Room:  [place] [address]  Admitting diagnosis: Small bowel ischemia [facility]) [K55.9]    Patient Name: Collin Tapia  MRN: [id number]    Date of Service: 6/29/2021     Subjective:  Danae العراقي is a 80 y.o. female who was seen and examined today,6/29/2021, at the bedside. Danae العراقي was evaluated ambulating to the bedside chair today. She states that she is feeling more comfortable today and is passing flatus. Diet is being advanced as per the general surgery team.  She continues to have intermittent periods of atrial fibrillation with rapid ventricular response. No family members were present during my examination. Review of System:   Constitutional:   Denies fever or chills, weight loss or gain, fatigue or malaise. HEENT:   Denies ear pain, sore throat, sinus or eye problems. NG tube has been removed. Cardiovascular:   Intermittent periods of palpitations. Respiratory:   Shortness of breath continues to improve. Gastrointestinal:   Postoperative abdominal pain as to be expected. .  Genitourinary:    Denies any urgency, frequency, hematuria. Voiding with the use of a Torres catheter. Extremities:   Denies lower extremity swelling, edema or cyanosis. Neurology:    Denies any headache or focal neurological deficits, Denies generalized weakness or memory difficulty. Psch:   Denies being anxious or depressed. Musculoskeletal:    Denies  myalgias, joint complaints or back pain. Integumentary:   Denies any rashes, ulcers, or excoriations. Denies bruising. Hematologic/Lymphatic:  Denies bruising or bleeding.     Physical Exam:  I/O this shift:  In: -   Out: 225 [Urine:225]    Intake/Output Summary (Last 24 hours) at 6/29/2021 1342  Last data filed at 6/29/2021 0800  Gross per 24 hour   Intake 2541.67 ml   Output 1675 ml   Net 866.67 ml   I/O last 3 completed shifts: In: 3411.7 [P.O.:320; I.V.:2941.7; IV Piggyback:150]  Out: 4907 [Urine:1450]  Patient Vitals for the past 96 hrs (Last 3 readings):   Weight   06/26/21 1742 135 lb (61.2 kg)     Vital Signs:   Blood pressure 129/60, pulse 94, temperature 98.4 °F (36.9 °C), temperature source Oral, resp. rate 22, height 5' (1.524 m), weight 135 lb (61.2 kg), SpO2 94 %. General appearance:  Awake and alert. Very polite and pleasant. Head:  Normocephalic. No masses, lesions or tenderness. Eyes:  PERRLA. EOMI. Sclera clear. Buccal mucosa moist.  ENT:  Ears normal. Mucosa normal.  NG tube has been removed  Neck:    Supple. Trachea midline. No thyromegaly. No JVD. No bruits. Heart:    Atrial fibrillation with intermittent rate control. Lungs:    Better aeration throughout. Abdomen:   Postoperative dressings are in place. Bowel sounds remain hypoactive. Pain to palpation diffusely with voluntary guarding elicited. Extremities:    Peripheral pulses present. No peripheral edema. No ulcers. No cyanosis. No clubbing. Neurologic:    Alert x 3. No focal deficit. Cranial nerves grossly intact. No focal weakness. Psych:   Behavior is normal. Mood appears normal. Speech is not rapid and/or pressured. Musculoskeletal:   Spine ROM normal. Muscular strength intact. Gait not assessed. Integumentary:  No rashes  Skin normal color and texture. Genitalia/Breast:  Voiding with use of a Torres catheter.     Medication:  Scheduled Meds:   docusate sodium  100 mg Oral Daily    senna  1 tablet Oral Nightly    potassium phosphate IVPB  30 mmol Intravenous Once    enoxaparin  1 mg/kg Subcutaneous BID    sodium chloride flush  10 mL Intravenous 2 times per day    pantoprazole  40 mg Intravenous Daily And    sodium chloride (PF)  10 mL Intravenous Daily    piperacillin-tazobactam  3,375 mg Intravenous Q8H    fluconazole  200 mg Intravenous Q24H     Continuous Infusions:   dilTIAZem 10 mg/hr (06/29/21 0800)    lactated ringers 85 mL/hr at 06/29/21 1129    sodium chloride      sodium chloride 25 mL (06/29/21 1237)       Objective Data:  CBC with Differential:    Lab Results   Component Value Date    WBC 11.5 06/29/2021    RBC 3.05 06/29/2021    HGB 9.7 06/29/2021    HCT 29.2 06/29/2021     06/29/2021    MCV 95.7 06/29/2021    MCH 31.8 06/29/2021    MCHC 33.2 06/29/2021    RDW 12.7 06/29/2021    LYMPHOPCT 10.6 06/29/2021    MONOPCT 8.5 06/29/2021    BASOPCT 0.1 06/29/2021    MONOSABS 0.98 06/29/2021    LYMPHSABS 1.22 06/29/2021    EOSABS 0.01 06/29/2021    BASOSABS 0.01 06/29/2021     BMP:    Lab Results   Component Value Date     06/29/2021    K 3.7 06/29/2021    K 3.8 06/28/2021     06/29/2021    CO2 25 06/29/2021    BUN 12 06/29/2021    LABALBU 2.4 06/29/2021    CREATININE 0.5 06/29/2021    CALCIUM 7.8 06/29/2021    GFRAA >60 06/29/2021    LABGLOM >60 06/29/2021    GLUCOSE 125 06/29/2021       Assessment:  1. Severe sepsis secondary to ischemic small bowel from an internal hernia status post exploratory laparotomy  2. Postoperative respiratory failure with hypoxia status post extubation  3. New onset atrial fibrillation with rapid ventricular response    Plan:   NG tube has been removed and full liquid diet has been instituted. She is passing flatus and has become increasingly ambulatory. Her respiratory status appears relatively stable at this point. .  She continues to have intermittent periods of atrial fibrillation with rapid ventricular response. She is maintained on a Cardizem infusion and oral medications are being uptitrated. Greater than 40 minutes of critical care time was spent with the patient.   This time included chart review, , and discussion with those consultants involved in the patient's care. More than 50% of my  time was spent at the bedside counseling/coordinating care with the patient and/or family with face to face contact. This time was spent reviewing notes and laboratory data as well as instructing and counseling the patient. Time I spent with the family or surrogate(s) is included only if the patient was incapable of providing the necessary information or participating in medical decisions. I also discussed the differential diagnosis and all of the proposed management plans with the patient and individuals accompanying the patient. Katarzyna Gill requires this high level of physician care and nursing in the ICU due to the complexity of decision management and chance of rapid decline or death. Continued cardiac monitoring and higher level of nursing are required. I am readily available for any further decision-making and intervention.      Mary Polanco DO, F.A.C.O.I.  6/29/2021  1:42 PM

## 2021-06-29 NOTE — PROGRESS NOTES
General Surgery Progress Note  Julio Marcano MD, MS    Patient's Name/Date of Birth: Iav Shepard / 8/9/1933    Date: June 29, 2021     Surgeon: Jennifer London MD    Chief Complaint: sbo    Patient Active Problem List   Diagnosis    Small bowel ischemia [facility])       Subjective: doing well, pain controlled, some flatus, tolerating clears    Objective:  /68   Pulse 87   Temp 97.9 °F (36.6 °C) (Oral)   Resp 19   Ht 5' (1.524 m)   Wt 135 lb (61.2 kg)   SpO2 94%   BMI 26.37 kg/m²   Labs:  Recent Labs     06/27/21  1238 06/28/21  0512 06/29/21  0607   WBC 15.1* 14.2* 11.5   HGB 15.0 11.5 9.7*   HCT 43.4 34.5 29.2*     Lab Results   Component Value Date    CREATININE 0.5 06/29/2021    BUN 12 06/29/2021     06/29/2021    K 3.7 06/29/2021     06/29/2021    CO2 25 06/29/2021     Recent Labs     06/26/21  1814   LIPASE 17         General appearance:  NAD  Head: NCAT, PERRLA, EOMI, red conjunctiva  Neck: supple, no masses  Lungs: CTAB, equal chest rise bilateral  Heart: irreg, controlled rate  Abdomen: soft, mildly distended, tender appropriately, incision C/D/I  Skin; no lesions  Gu: no cva tenderness  Extremities: extremities normal, atraumatic, no cyanosis or edema      Assessment/Plan:  Iva Shepard is a 80 y.o. female POD 2 SBR for closed loop obstruction, small bowel ischemia, afib    Full liquids  PT/OT  Torres out  Replace electrolytes  Cardizem per ICU/medicine  Ok out of ICU once ok with others  Await return bowel function    Physician Signature: Electronically signed by Dr. Jennifer London  [phone] (p)  6/29/2021  8:10 AM

## 2021-06-30 LAB
ALBUMIN SERPL-MCNC: 2.6 G/DL (ref 3.5–5.2)
ALP BLD-CCNC: 106 U/L (ref 35–104)
ALT SERPL-CCNC: 12 U/L (ref 0–32)
ANION GAP SERPL CALCULATED.3IONS-SCNC: 10 MMOL/L (ref 7–16)
AST SERPL-CCNC: 17 U/L (ref 0–31)
BASOPHILS ABSOLUTE: 0.01 E9/L (ref 0–0.2)
BASOPHILS RELATIVE PERCENT: 0.1 % (ref 0–2)
BILIRUB SERPL-MCNC: 0.3 MG/DL (ref 0–1.2)
BUN BLDV-MCNC: 6 MG/DL (ref 6–23)
C DIFF TOXIN/ANTIGEN: NORMAL
CALCIUM SERPL-MCNC: 7.9 MG/DL (ref 8.6–10.2)
CHLORIDE BLD-SCNC: 99 MMOL/L (ref 98–107)
CO2: 24 MMOL/L (ref 22–29)
CREAT SERPL-MCNC: 0.4 MG/DL (ref 0.5–1)
EOSINOPHILS ABSOLUTE: 0.05 E9/L (ref 0.05–0.5)
EOSINOPHILS RELATIVE PERCENT: 0.5 % (ref 0–6)
GFR AFRICAN AMERICAN: >60
GFR NON-AFRICAN AMERICAN: >60 ML/MIN/1.73
GLUCOSE BLD-MCNC: 157 MG/DL (ref 74–99)
HCT VFR BLD CALC: 28.6 % (ref 34–48)
HEMOGLOBIN: 9.4 G/DL (ref 11.5–15.5)
IMMATURE GRANULOCYTES #: 0.04 E9/L
IMMATURE GRANULOCYTES %: 0.4 % (ref 0–5)
LYMPHOCYTES ABSOLUTE: 0.97 E9/L (ref 1.5–4)
LYMPHOCYTES RELATIVE PERCENT: 10.5 % (ref 20–42)
MAGNESIUM: 1.9 MG/DL (ref 1.6–2.6)
MCH RBC QN AUTO: 31.4 PG (ref 26–35)
MCHC RBC AUTO-ENTMCNC: 32.9 % (ref 32–34.5)
MCV RBC AUTO: 95.7 FL (ref 80–99.9)
MONOCYTES ABSOLUTE: 0.77 E9/L (ref 0.1–0.95)
MONOCYTES RELATIVE PERCENT: 8.3 % (ref 2–12)
NEUTROPHILS ABSOLUTE: 7.44 E9/L (ref 1.8–7.3)
NEUTROPHILS RELATIVE PERCENT: 80.2 % (ref 43–80)
PDW BLD-RTO: 12.6 FL (ref 11.5–15)
PHOSPHORUS: 2 MG/DL (ref 2.5–4.5)
PLATELET # BLD: 152 E9/L (ref 130–450)
PMV BLD AUTO: 10.7 FL (ref 7–12)
POTASSIUM SERPL-SCNC: 3.5 MMOL/L (ref 3.5–5)
RBC # BLD: 2.99 E12/L (ref 3.5–5.5)
SODIUM BLD-SCNC: 133 MMOL/L (ref 132–146)
TOTAL PROTEIN: 4.9 G/DL (ref 6.4–8.3)
WBC # BLD: 9.3 E9/L (ref 4.5–11.5)

## 2021-06-30 PROCEDURE — 2580000003 HC RX 258: Performed by: INTERNAL MEDICINE

## 2021-06-30 PROCEDURE — 2500000003 HC RX 250 WO HCPCS: Performed by: INTERNAL MEDICINE

## 2021-06-30 PROCEDURE — 6360000002 HC RX W HCPCS: Performed by: SURGERY

## 2021-06-30 PROCEDURE — 2580000003 HC RX 258: Performed by: SURGERY

## 2021-06-30 PROCEDURE — 83735 ASSAY OF MAGNESIUM: CPT

## 2021-06-30 PROCEDURE — 6360000002 HC RX W HCPCS: Performed by: INTERNAL MEDICINE

## 2021-06-30 PROCEDURE — 6370000000 HC RX 637 (ALT 250 FOR IP): Performed by: SURGERY

## 2021-06-30 PROCEDURE — 85025 COMPLETE CBC W/AUTO DIFF WBC: CPT

## 2021-06-30 PROCEDURE — 80053 COMPREHEN METABOLIC PANEL: CPT

## 2021-06-30 PROCEDURE — 2000000000 HC ICU R&B

## 2021-06-30 PROCEDURE — C9113 INJ PANTOPRAZOLE SODIUM, VIA: HCPCS | Performed by: SURGERY

## 2021-06-30 PROCEDURE — 97165 OT EVAL LOW COMPLEX 30 MIN: CPT

## 2021-06-30 PROCEDURE — 84100 ASSAY OF PHOSPHORUS: CPT

## 2021-06-30 PROCEDURE — 97530 THERAPEUTIC ACTIVITIES: CPT

## 2021-06-30 PROCEDURE — 2700000000 HC OXYGEN THERAPY PER DAY

## 2021-06-30 RX ORDER — CHOLESTYRAMINE 4 G/9G
1 POWDER, FOR SUSPENSION ORAL 2 TIMES DAILY
Status: DISCONTINUED | OUTPATIENT
Start: 2021-06-30 | End: 2021-07-02 | Stop reason: HOSPADM

## 2021-06-30 RX ORDER — POTASSIUM CHLORIDE 29.8 MG/ML
20 INJECTION INTRAVENOUS ONCE
Status: COMPLETED | OUTPATIENT
Start: 2021-06-30 | End: 2021-06-30

## 2021-06-30 RX ADMIN — PANTOPRAZOLE SODIUM 40 MG: 40 INJECTION, POWDER, FOR SOLUTION INTRAVENOUS at 08:46

## 2021-06-30 RX ADMIN — AMIODARONE HYDROCHLORIDE 1 MG/MIN: 50 INJECTION, SOLUTION INTRAVENOUS at 10:28

## 2021-06-30 RX ADMIN — AMIODARONE HYDROCHLORIDE 75 MG: 50 INJECTION, SOLUTION INTRAVENOUS at 10:08

## 2021-06-30 RX ADMIN — POTASSIUM CHLORIDE 20 MEQ: 29.8 INJECTION, SOLUTION INTRAVENOUS at 12:30

## 2021-06-30 RX ADMIN — FLUCONAZOLE IN SODIUM CHLORIDE 200 MG: 2 INJECTION, SOLUTION INTRAVENOUS at 15:13

## 2021-06-30 RX ADMIN — Medication 10 ML: at 20:02

## 2021-06-30 RX ADMIN — CHOLESTYRAMINE 4 G: 4 POWDER, FOR SUSPENSION ORAL at 13:21

## 2021-06-30 RX ADMIN — SODIUM CHLORIDE, PRESERVATIVE FREE 10 ML: 5 INJECTION INTRAVENOUS at 08:47

## 2021-06-30 RX ADMIN — SODIUM CHLORIDE, POTASSIUM CHLORIDE, SODIUM LACTATE AND CALCIUM CHLORIDE: 600; 310; 30; 20 INJECTION, SOLUTION INTRAVENOUS at 08:18

## 2021-06-30 RX ADMIN — CHOLESTYRAMINE 4 G: 4 POWDER, FOR SUSPENSION ORAL at 20:00

## 2021-06-30 RX ADMIN — ENOXAPARIN SODIUM 60 MG: 60 INJECTION SUBCUTANEOUS at 20:32

## 2021-06-30 RX ADMIN — Medication 10 ML: at 08:47

## 2021-06-30 RX ADMIN — DILTIAZEM HYDROCHLORIDE 5 MG/HR: 5 INJECTION, SOLUTION INTRAVENOUS at 04:29

## 2021-06-30 RX ADMIN — AMIODARONE HYDROCHLORIDE 0.5 MG/MIN: 50 INJECTION, SOLUTION INTRAVENOUS at 16:56

## 2021-06-30 RX ADMIN — ENOXAPARIN SODIUM 60 MG: 60 INJECTION SUBCUTANEOUS at 08:46

## 2021-06-30 ASSESSMENT — PAIN DESCRIPTION - PROGRESSION
CLINICAL_PROGRESSION: NOT CHANGED

## 2021-06-30 ASSESSMENT — PAIN - FUNCTIONAL ASSESSMENT: PAIN_FUNCTIONAL_ASSESSMENT: ACTIVITIES ARE NOT PREVENTED

## 2021-06-30 ASSESSMENT — PAIN SCALES - GENERAL
PAINLEVEL_OUTOF10: 0

## 2021-06-30 ASSESSMENT — PAIN DESCRIPTION - FREQUENCY: FREQUENCY: INTERMITTENT

## 2021-06-30 ASSESSMENT — PAIN DESCRIPTION - PAIN TYPE: TYPE: ACUTE PAIN

## 2021-06-30 ASSESSMENT — PAIN DESCRIPTION - ONSET: ONSET: ON-GOING

## 2021-06-30 ASSESSMENT — PAIN DESCRIPTION - ORIENTATION: ORIENTATION: MID

## 2021-06-30 ASSESSMENT — PAIN DESCRIPTION - DESCRIPTORS: DESCRIPTORS: SORE;DISCOMFORT

## 2021-06-30 ASSESSMENT — PAIN DESCRIPTION - LOCATION: LOCATION: ABDOMEN

## 2021-06-30 NOTE — CARE COORDINATION
6-30-Cm note: ( no covid testing) pt remains in ICU, on cardizem gtt, she is planning on going home at dc with no needs, pt on 2l nc , she believes she won't need this at discharge. Family is requesting a meeting today to talk about her ability to retrun home. Pt adamant she will return home .  Electronically signed by Nadia Epps RN on 6/30/2021 at 2:21 PM

## 2021-06-30 NOTE — PROGRESS NOTES
Pulmonary/Critical Care Progress Note    We are following patient for small bowel resection (180 cm) secondary to ischemia from internal herniation or adhesion of entrapped mid jejunal segment, atrial fibrillation on Cardizem drip, mild hypokalemia    SUBJECTIVE:  Patient is taking full liquids and will be starting on a soft diet soon. However, she remains in atrial fibrillation despite her Cardizem drip. Therefore, we will discontinue this and initiate amiodarone and use half of the usual loading dose (75 mg) and then begin the amiodarone protocol with careful watch of her heart rate. IV fluids will be decreased. Potassium will be supplemented. The patient was in a chair yesterday and we will continue the same today. She is experiencing minimal incisional pain and is otherwise doing well    MEDICATIONS:   docusate sodium  100 mg Oral Daily    senna  1 tablet Oral Nightly    enoxaparin  1 mg/kg Subcutaneous BID    sodium chloride flush  10 mL Intravenous 2 times per day    pantoprazole  40 mg Intravenous Daily    And    sodium chloride (PF)  10 mL Intravenous Daily    fluconazole  200 mg Intravenous Q24H      lactated ringers 85 mL/hr at 06/30/21 0818    sodium chloride       diclofenac sodium, traMADol, perflutren lipid microspheres, sodium chloride flush, sodium chloride, morphine **OR** morphine, ondansetron **OR** ondansetron      REVIEW OF SYSTEMS:  Constitutional: Denies fever, weight loss, night sweats, and fatigue  Skin: Denies pigmentation, dark lesions, and rashes   HEENT: Denies hearing loss, tinnitus, ear drainage, epistaxis, sore throat, and hoarseness. Cardiovascular: Denies palpitations, chest pain, and chest pressure. Respiratory: Denies cough, dyspnea at rest, hemoptysis, apnea, and choking.   Gastrointestinal: Denies nausea, vomiting, poor appetite, diarrhea, heartburn or reflux  Genitourinary: Denies dysuria, frequency, urgency or hematuria  Musculoskeletal: Denies myalgias, muscle weakness, and bone pain  Neurological: Denies dizziness, vertigo, headache, and focal weakness  Psychological: Denies anxiety and depression  Endocrine: Denies heat intolerance and cold intolerance  Hematopoietic/Lymphatic: Denies bleeding problems and blood transfusions    OBJECTIVE:  Vitals:    06/30/21 0932   BP:    Pulse: 105   Resp:    Temp:    SpO2:      FiO2 : 50 %  O2 Flow Rate (L/min): 2 L/min  O2 Device: Nasal cannula    PHYSICAL EXAM:  Constitutional: No fever, chills, diaphoresis  Skin: No skin rash, no skin breakdown  HEENT: Unremarkable  Neck: JVD, lymphadenopathy, thyromegaly  Cardiovascular: S1, S2 irregular. No S3 or rubs noted  Respiratory: Rare inspiratory crackles at both lung bases. There is no wheezes and no evidence of consolidation  Gastrointestinal: Postoperative. Incisional pain to palpation  Genitourinary: No CVA tenderness  Extremities: Clubbing, cyanosis, or edema  Neurological: Awake, alert, oriented x3. No evidence of focal motor or sensory deficits  Psychological: Appropriate affect.   In good spirits    LABS:  WBC   Date Value Ref Range Status   06/29/2021 11.5 4.5 - 11.5 E9/L Final   06/28/2021 14.2 (H) 4.5 - 11.5 E9/L Final   06/27/2021 15.1 (H) 4.5 - 11.5 E9/L Final     Hemoglobin   Date Value Ref Range Status   06/29/2021 9.7 (L) 11.5 - 15.5 g/dL Final   06/28/2021 11.5 11.5 - 15.5 g/dL Final   06/27/2021 15.0 11.5 - 15.5 g/dL Final     Hematocrit   Date Value Ref Range Status   06/29/2021 29.2 (L) 34.0 - 48.0 % Final   06/28/2021 34.5 34.0 - 48.0 % Final   06/27/2021 43.4 34.0 - 48.0 % Final     MCV   Date Value Ref Range Status   06/29/2021 95.7 80.0 - 99.9 fL Final   06/28/2021 94.0 80.0 - 99.9 fL Final   06/27/2021 91.8 80.0 - 99.9 fL Final     Platelets   Date Value Ref Range Status   06/29/2021 141 130 - 450 E9/L Final   06/28/2021 159 130 - 450 E9/L Final   06/27/2021 227 130 - 450 E9/L Final     Sodium   Date Value Ref Range Status   06/29/2021 131 (L) 132 - 146 mmol/L Final   06/29/2021 135 132 - 146 mmol/L Final   06/28/2021 136 132 - 146 mmol/L Final     Potassium   Date Value Ref Range Status   06/29/2021 3.7 3.5 - 5.0 mmol/L Final   06/29/2021 3.7 3.5 - 5.0 mmol/L Final     Potassium reflex Magnesium   Date Value Ref Range Status   06/28/2021 3.8 3.5 - 5.0 mmol/L Final   06/27/2021 3.9 3.5 - 5.0 mmol/L Final   06/27/2021 3.9 3.5 - 5.0 mmol/L Final     Chloride   Date Value Ref Range Status   06/29/2021 98 98 - 107 mmol/L Final   06/29/2021 102 98 - 107 mmol/L Final   06/28/2021 106 98 - 107 mmol/L Final     CO2   Date Value Ref Range Status   06/29/2021 25 22 - 29 mmol/L Final   06/29/2021 25 22 - 29 mmol/L Final   06/28/2021 24 22 - 29 mmol/L Final     BUN   Date Value Ref Range Status   06/29/2021 9 6 - 23 mg/dL Final   06/29/2021 12 6 - 23 mg/dL Final   06/28/2021 28 (H) 6 - 23 mg/dL Final     CREATININE   Date Value Ref Range Status   06/29/2021 0.5 0.5 - 1.0 mg/dL Final   06/29/2021 0.5 0.5 - 1.0 mg/dL Final   06/28/2021 0.9 0.5 - 1.0 mg/dL Final     Glucose   Date Value Ref Range Status   06/29/2021 134 (H) 74 - 99 mg/dL Final   06/29/2021 125 (H) 74 - 99 mg/dL Final   06/28/2021 147 (H) 74 - 99 mg/dL Final     Calcium   Date Value Ref Range Status   06/29/2021 8.1 (L) 8.6 - 10.2 mg/dL Final   06/29/2021 7.8 (L) 8.6 - 10.2 mg/dL Final   06/28/2021 7.9 (L) 8.6 - 10.2 mg/dL Final     Total Protein   Date Value Ref Range Status   06/29/2021 4.4 (L) 6.4 - 8.3 g/dL Final   06/27/2021 4.0 (L) 6.4 - 8.3 g/dL Final   06/26/2021 7.7 6.4 - 8.3 g/dL Final     Albumin   Date Value Ref Range Status   06/29/2021 2.4 (L) 3.5 - 5.2 g/dL Final   06/27/2021 2.5 (L) 3.5 - 5.2 g/dL Final   06/26/2021 4.8 3.5 - 5.2 g/dL Final     Total Bilirubin   Date Value Ref Range Status   06/29/2021 0.3 0.0 - 1.2 mg/dL Final   06/27/2021 0.3 0.0 - 1.2 mg/dL Final   06/26/2021 0.6 0.0 - 1.2 mg/dL Final     Alkaline Phosphatase   Date Value Ref Range Status   06/29/2021 61 35 - 104 U/L Final 06/27/2021 74 35 - 104 U/L Final   06/26/2021 112 (H) 35 - 104 U/L Final     AST   Date Value Ref Range Status   06/29/2021 24 0 - 31 U/L Final     Comment:     Specimen is slightly Hemolyzed. Result may be artificially increased. 06/27/2021 14 0 - 31 U/L Final   06/26/2021 22 0 - 31 U/L Final     ALT   Date Value Ref Range Status   06/29/2021 10 0 - 32 U/L Final   06/27/2021 10 0 - 32 U/L Final   06/26/2021 17 0 - 32 U/L Final     GFR Non-   Date Value Ref Range Status   06/29/2021 >60 >=60 mL/min/1.73 Final     Comment:     Chronic Kidney Disease: less than 60 ml/min/1.73 sq.m. Kidney Failure: less than 15 ml/min/1.73 sq.m. Results valid for patients 18 years and older. 06/29/2021 >60 >=60 mL/min/1.73 Final     Comment:     Chronic Kidney Disease: less than 60 ml/min/1.73 sq.m. Kidney Failure: less than 15 ml/min/1.73 sq.m. Results valid for patients 18 years and older. 06/28/2021 59 >=60 mL/min/1.73 Final     Comment:     Chronic Kidney Disease: less than 60 ml/min/1.73 sq.m. Kidney Failure: less than 15 ml/min/1.73 sq.m. Results valid for patients 18 years and older. GFR    Date Value Ref Range Status   06/29/2021 >60  Final   06/29/2021 >60  Final   06/28/2021 >60  Final     Magnesium   Date Value Ref Range Status   06/29/2021 1.9 1.6 - 2.6 mg/dL Final   06/29/2021 1.7 1.6 - 2.6 mg/dL Final     Phosphorus   Date Value Ref Range Status   06/29/2021 2.8 2.5 - 4.5 mg/dL Final   06/29/2021 1.6 (L) 2.5 - 4.5 mg/dL Final   06/28/2021 4.1 2.5 - 4.5 mg/dL Final     No results for input(s): PH, PO2, PCO2, HCO3, BE, O2SAT in the last 72 hours. RADIOLOGY:  XR CHEST PORTABLE   Final Result   Free air under the hemidiaphragms consistent with a recent surgical procedure. The lungs are clear. XR CHEST PORTABLE   Final Result   Endotracheal tube in good position. Left subclavian central venous catheter tip in the SVC.       NG tube in the stomach. No pneumothorax on the right on the left. No acute cardiopulmonary process. XR ABDOMEN FOR NG/OG/NE TUBE PLACEMENT   Final Result   1. Satisfactory position of the NG tube within stomach. CT ABDOMEN PELVIS W IV CONTRAST Additional Contrast? None   Final Result   Findings for segmental small bowel ischemia of entrapped mid jejunal segment   likely due internal herniation/adhesions, forming a closed loop   syndrome-likely with vascular compromise including severe mesenteric edema   and thickening of bowel wall. Preliminary report given to Dr. Simone Castañeda physician in [facility] at the time of the interpretation. XR CHEST PORTABLE    (Results Pending)           PROBLEM LIST:  Active Problems:    Small bowel ischemia (HCC)  Resolved Problems:    * No resolved hospital problems. *      IMPRESSION:  1. Status post small bowel resection with 180 cm removed of the jejunum secondary to ischemia caused by adhesions between which small bowel was incarcerated and nonviable  2. Atrial fibrillation, new onset  3. Minimal hypokalemia  4. Postoperative respiratory failure, now resolved    PLAN:  1. Decrease IV fluids slightly to 60 cc/h  2. Increase diet  3. Discontinue IV diltiazem and begin IV amiodarone with half the usual bolus of 75 mg, then per protocol  4. Continue IV Zosyn and fluconazole  5. Out of bed in chair  6. Labs in a.m.  7. Follow cardiac rhythm carefully    ATTESTATION:  ICU Staff Physician note of personal involvement in Care  As the attending physician, I certify that I personally reviewed the patients history and personally examined the patient to confirm the physical findings described above,  And that I reviewed the relevant imaging studies and available reports. I also discussed the differential diagnosis and all of the proposed management plans with the patient and individuals accompanying the patient to this visit.   They had the opportunity to ask questions about the proposed management plans and to have those questions answered. This patient has a high probability of sudden, clinically significant deterioration, which requires the highest level of physician preparedness to intervene urgently. I managed/supervised life or organ supporting interventions that required frequent physician assessment. I devoted my full attention to the direct care of this patient for the amount of time indicated below. Time I spent with the family or surrogate(s) is included only if the patient was incapable of providing the necessary information or participating in medical decisions - Time devoted to teaching and to any procedures I billed separately is not included.     CRITICAL CARE TIME:  33 minutes    Electronically signed by Farzana Camargo MD on 6/30/2021 at 9:41 AM

## 2021-06-30 NOTE — CARE COORDINATION
6-30-Cm note: ( no covid testing) met with pt for SNF choice for rehab as recommended by PT/OT , pt chose [facility] , referral called, will await response, pt remains in ICU, on cardize gtt.  Electronically signed by Leopoldo Knapp RN on 6/30/2021 at 3:42 PM

## 2021-06-30 NOTE — PROGRESS NOTES
Internal Medicine Progress Note    R[facility]    Harvis Leventhal. Liu Valladares., F.A.C.O.I. Evan Hopper D.O., STEVEOKAROL Sanford D.O. Aleyda Medeiros, MSN, APRN, NP-C  Rudolph Waters. Nicolas Cabrera, MSN, APRN-CNP     Primary Care Physician: Pasquale Baugh MD   Admitting Physician:  Rosetta Stevenson MD  Admission date and time: 6/26/2021  6:05 PM    Room:  [place] [address]  Admitting diagnosis: Small bowel ischemia [facility]) [K55.9]    Patient Name: Aminta Schuler  MRN: [id number]    Date of Service: 6/30/2021     Subjective:  Pavithra Pisano is a 80 y.o. female who was seen and examined today,6/30/2021, at the bedside. Pavithra Pisano was evaluated while seated in bed. She continues to improve on a daily basis and states she is feeling much better. She is now tolerating a regular diet. She would like to become more ambulatory. She has been transitioned to IV amiodarone in an attempt to obtain better rate control. Review of System:   Constitutional:   Denies fever or chills, weight loss or gain, fatigue or malaise. HEENT:   Denies ear pain, sore throat, sinus or eye problems. Nasal cannula oxygen is in place. Cardiovascular:   Intermittent periods of palpitations. Respiratory:   Shortness of breath is nearly resolved. Most noticeable with exertion. Gastrointestinal:   Ongoing improvement in postoperative abdominal pain. She is tolerating a regular diet. Genitourinary:    Denies any urgency, frequency, hematuria. Voiding with the use of a Torres catheter. Extremities:   Denies lower extremity swelling, edema or cyanosis. Neurology:    Denies any headache or focal neurological deficits, Denies generalized weakness or memory difficulty. Psch:   Denies being anxious or depressed. Musculoskeletal:    Denies  myalgias, joint complaints or back pain. Integumentary:   Denies any rashes, ulcers, or excoriations. Denies bruising.   Hematologic/Lymphatic:  Denies bruising or bleeding. Physical Exam:  I/O this shift: In: 460 [P.O.:360; IV Piggyback:100]  Out: -     Intake/Output Summary (Last 24 hours) at 6/30/2021 1328  Last data filed at 6/30/2021 1200  Gross per 24 hour   Intake 3544.75 ml   Output 125 ml   Net 3419.75 ml   I/O last 3 completed shifts: In: 3204.8 [P.O.:240; I.V.:2614.8; IV Piggyback:350]  Out: 350 [Urine:350]  Patient Vitals for the past 96 hrs (Last 3 readings):   Weight   06/26/21 1742 135 lb (61.2 kg)     Vital Signs:   Blood pressure 135/80, pulse 114, temperature 98.4 °F (36.9 °C), temperature source Oral, resp. rate 25, height 5' (1.524 m), weight 135 lb (61.2 kg), SpO2 93 %. General appearance:  Awake and alert. Very polite and pleasant. Head:  Normocephalic. No masses, lesions or tenderness. Eyes:  PERRLA. EOMI. Sclera clear. Buccal mucosa moist.  ENT:  Ears normal. Mucosa normal.  Nasal cannula oxygen is in place. Neck:    Supple. Trachea midline. No thyromegaly. No JVD. No bruits. Heart:    Atrial fibrillation with intermittent rate control. Lungs:    Better aeration throughout. Abdomen:   Postoperative dressings are in place. Bowel sounds remain hypoactive. Pain to palpation diffusely with voluntary guarding elicited. Extremities:    Peripheral pulses present. No peripheral edema. No ulcers. No cyanosis. No clubbing. Neurologic:    Alert x 3. No focal deficit. Cranial nerves grossly intact. No focal weakness. Psych:   Behavior is normal. Mood appears normal. Speech is not rapid and/or pressured. Musculoskeletal:   Spine ROM normal. Muscular strength intact. Gait not assessed. Integumentary:  No rashes  Skin normal color and texture. Genitalia/Breast:  Voiding with use of a Torres catheter.     Medication:  Scheduled Meds:   cholestyramine  1 packet Oral BID    enoxaparin  1 mg/kg Subcutaneous BID    sodium chloride flush  10 mL Intravenous 2 times per day    pantoprazole  40 mg Intravenous Daily    And    sodium chloride (PF)  10 mL Intravenous Daily    fluconazole  200 mg Intravenous Q24H     Continuous Infusions:   amiodarone 1 mg/min (06/30/21 1200)    Followed by   Sapphire Rojas amiodarone      sodium chloride         Objective Data:  CBC with Differential:    Lab Results   Component Value Date    WBC 9.3 06/30/2021    RBC 2.99 06/30/2021    HGB 9.4 06/30/2021    HCT 28.6 06/30/2021     06/30/2021    MCV 95.7 06/30/2021    MCH 31.4 06/30/2021    MCHC 32.9 06/30/2021    RDW 12.6 06/30/2021    LYMPHOPCT 10.5 06/30/2021    MONOPCT 8.3 06/30/2021    BASOPCT 0.1 06/30/2021    MONOSABS 0.77 06/30/2021    LYMPHSABS 0.97 06/30/2021    EOSABS 0.05 06/30/2021    BASOSABS 0.01 06/30/2021     BMP:    Lab Results   Component Value Date     06/30/2021    K 3.5 06/30/2021    K 3.8 06/28/2021    CL 99 06/30/2021    CO2 24 06/30/2021    BUN 6 06/30/2021    LABALBU 2.6 06/30/2021    CREATININE 0.4 06/30/2021    CALCIUM 7.9 06/30/2021    GFRAA >60 06/30/2021    LABGLOM >60 06/30/2021    GLUCOSE 157 06/30/2021       Assessment:  1. Severe sepsis secondary to ischemic small bowel from an internal hernia status post exploratory laparotomy  2. Postoperative respiratory failure with hypoxia status post extubation  3. New onset atrial fibrillation with rapid ventricular response    Plan:   Sonu Roblero has been escalated to a regular diet at this point. She is tolerating this without complication. She is becoming increasingly active and is nearly working with the therapy teams. Unfortunately, she continues to have variable rate control and IV Cardizem infusion has been substituted with IV amiodarone. Based on the patient's current clinical status and stabilization of blood count, anticipate transitioning to oral anticoagulation therapy soon. Greater than 40 minutes of critical care time was spent with the patient. This time included chart review, , and discussion with those consultants involved in the patient's care.     More than 50% of my  time was spent at the bedside counseling/coordinating care with the patient and/or family with face to face contact. This time was spent reviewing notes and laboratory data as well as instructing and counseling the patient. Time I spent with the family or surrogate(s) is included only if the patient was incapable of providing the necessary information or participating in medical decisions. I also discussed the differential diagnosis and all of the proposed management plans with the patient and individuals accompanying the patient. Serge Bain requires this high level of physician care and nursing in the ICU due to the complexity of decision management and chance of rapid decline or death. Continued cardiac monitoring and higher level of nursing are required. I am readily available for any further decision-making and intervention.      Madeline Cervantes DO, F.A.C.O.I.  6/30/2021  1:28 PM

## 2021-06-30 NOTE — PLAN OF CARE
Problem: Skin Integrity:  Goal: Will show no infection signs and symptoms  Description: Will show no infection signs and symptoms  Outcome: Met This Shift  Goal: Absence of new skin breakdown  Description: Absence of new skin breakdown  Outcome: Met This Shift     Problem: Anxiety/Stress:  Goal: Level of anxiety will decrease  Description: Level of anxiety will decrease  Outcome: Met This Shift     Problem: Aspiration:  Goal: Absence of aspiration  Description: Absence of aspiration  Outcome: Met This Shift

## 2021-06-30 NOTE — PROGRESS NOTES
GENERAL SURGERY  DAILY PROGRESS NOTE  6/30/2021    Subjective:  Doing well this morning. On cardizem gtt. HR controlled. Objective:  BP (!) 141/77   Pulse 101   Temp 98 °F (36.7 °C) (Oral)   Resp 30   Ht 5' (1.524 m)   Wt 135 lb (61.2 kg)   SpO2 94%   BMI 26.37 kg/m²     GENERAL:  Laying in bed, awake, alert, cooperative, no apparent distress  HEAD: Normocephalic, atraumatic  EYES: No sclera icterus, pupils equal  LUNGS:  No increased work of breathing  CARDIOVASCULAR:  Regular rate  ABDOMEN:  Soft, minimally distended, grossly NTTP throughout  EXTREMITIES: No edema or swelling  SKIN: Warm and dry    Assessment/Plan:  80 y.o. female oneal full liquids. No N/V. 240mL PO recorded yesterday. Having bowel function. Advance to regular diet today. Electronically signed by Kin Lopez MD on 6/30/2021 at 7:21 AM      Significant bowel function with multiple bowel movements  Intermittent A. fib RVR on amiodarone now  Diet as tolerated  Discontinue stool softeners  PT OT  Discharge planning  I had a long discussion with her family regarding the surgical procedure her recovery and ongoing clinical and medical problems.   Anticipate acute rehab    Zachariah Brower MD, MS  Minimally Invasive and Bariatric Surgery  [phone] (p)  6/30/2021  12:42 PM

## 2021-06-30 NOTE — PROGRESS NOTES
[address] [facility]  [place]. [place]        Date:2021                                                  Patient Name: Julisa Porter    MRN: [id number]    : 1933    Room: [place] [address]      Evaluating OT: Ashanti Lopez OTR/L; [id number]     Referring Provider and Specific Provider Orders/Date:      21  OT eval and treat Start: 21, End: 21 134, ONE TIME, Standing Count: 1 Occurrences, R      Vicky Mason, DO     Placement Recommendation: Subacute        Diagnosis:   1. Small bowel ischemia ([facility] [address]         Surgery:   EXPLORATORY LAPAROTOMY, SMALL BOWEL RESECTION, TRIPLE LUMEN CENTRAL VENOUS CATHETER INSERTION      Pertinent Medical History:     History reviewed. No pertinent past medical history.       Past Surgical History:   Procedure Laterality Date    CHOLECYSTECTOMY      COLECTOMY N/A 2021    EXPLORATORY LAPAROTOMY, SMALL BOWEL RESECTION, TRIPLE LUMEN CENTRAL VENOUS CATHETER INSERTION performed by Vidhi Trujillo MD at [facility]        Precautions:  Fall Risk, O2     Assessment of current deficits    [x] Functional mobility  [x]ADLs  [x] Strength               []Cognition    [x] Functional transfers   [x] IADLs         [] Safety Awareness   [x]Endurance    [] Fine Coordination              [x] Balance      [] Vision/perception   []Sensation     []Gross Motor Coordination  [] ROM  [] Delirium                   [] Motor Control     OT PLAN OF CARE   OT POC based on physician orders, patient diagnosis and results of clinical assessment    Frequency/Duration 1-3 days/wk for 2 weeks PRN     Specific OT Treatment Interventions to include:   * Instruction/training on adapted ADL techniques and AE recommendations to increase functional independence within precautions       * Training on energy conservation strategies, correct breathing pattern and techniques to improve independence/tolerance for self-care routine  * Functional transfer/mobility training/DME recommendations for increased independence, safety, and fall prevention  * Patient/Family education to increase follow through with safety techniques and functional independence  * Recommendation of environmental modifications for increased safety with functional transfers/mobility and ADLs  * Therapeutic exercise to improve motor endurance, ROM, and functional strength for ADLs/functional transfers  * Therapeutic activities to facilitate/challenge dynamic balance, stand tolerance for increased safety and independence with ADLs  * Positioning to improve skin integrity, interaction with environment and functional independence    Recommended Adaptive Equipment: TBD      Home Living: alone; single family home, 1 story, 3 steps to enter with no rail, 2 steps from garage with rail, walk in shower       Equipment owned: wheeled walker, grab bars, O2     Prior Level of Function: Independent with ADLs , Independent with IADLs; ambulated no device    Driving: yes  Occupation: involved in a [employer] in [place]     Pain Level: 10/10 pain in abdomin; Nursing notified.       Cognition: A&O: 4/4; Follows 1 step directions   Memory: good    Sequencing: good    Problem solving: good    Judgement/safety: good     [facility]   AM-PAC Daily Activity Inpatient   How much help for putting on and taking off regular lower body clothing?: A Lot  How much help for Bathing?: A Lot  How much help for Toileting?: A Lot  How much help for putting on and taking off regular upper body clothing?: A Little  How much help for taking care of personal grooming?: A Little  How much help for eating meals?: None  AM-[facility] Inpatient Daily Activity Raw Score: 16  AM-PAC Inpatient ADL T-Scale Score : 35.96  ADL Inpatient CMS 0-100% Score: 53.32  ADL Inpatient CMS G-Code Modifier : CK     Functional Assessment:    Initial Eval Status  Date: 6/30/21 Treatment Status  Date: [facility] = LTGs  Time frame: 10-14 days   Feeding Independent   Independent    Grooming Minimal Assist   Independent    UB Dressing Minimal Assist   Independent    LB Dressing Maximal Assist to don incontinent garment at edge of bed and stood with assistance to pull up around hips and waist  Supervision    Bathing Maximal Assist  Minimal Assist    Toileting Maximal Assist   Minimal Assist    Bed Mobility  Supine to sit: Moderate Assist   Sit to supine: N/T as pt was up in chair   Supine to sit: Independent   Sit to supine: Independent    Functional Transfers Moderate Assist from EOB to wheeled walker. Transfer training with verbal cues for hand placement throughout session to improve safety. Independent    Functional Mobility Minimal Assist with wheeled walker within the room, household distance, verbal cues for walker sequence and safety. Modified [place]    Balance Sitting:     Static: fair     Dynamic: fair   Standing: fair  with wheeled walker     Activity Tolerance fair   good    Visual/  Perceptual Glasses: yes                Hand Dominance: right      AROM (PROM) Strength Additional Info:    RUE  WFL with exception of limited shoulder flexion to 20* 2+/5 proximally  3/5 distally good  and wfl FMC/dexterity noted during ADL tasks     LUE WFL 4/5 good  and wfl FMC/dexterity noted during ADL tasks       Hearing: [employer] [facility]   Sensation:  No c/o numbness or tingling  Tone: WFL   Edema: no     Comments: Upon arrival patient supine. At end of session, patient was up in chair with call light and phone within reach, all lines and tubes intact. Overall patient demonstrated decreased independence and safety during completion of ADL/functional transfer/mobility tasks. Pt would benefit from continued skilled OT to increase safety and independence with completion of ADL/IADL tasks for functional independence and quality of life.     Treatment: OT treatment provided this date includes:    Instruction/training on safety and adapted techniques for completion of ADLs    Instruction/training on safe functional mobility/transfer techniques    Instruction/training on energy conservation/work simplification for completion of ADLs    Proper Positioning/Alignment    Assistance for line management. Rehab Potential: Good for established goals. Patient / Family Goal: go to rehab, family would like patient to attend rehab as well per darnell management     Patient and/or family were instructed on functional diagnosis, prognosis/goals and OT plan of care. Demonstrated good understanding. Eval Complexity: Low    Time In: 2:25pm  Time Out: 3:00pm    Total Treatment Time: 15      Min Units   OT Eval Low [address]  X  1    OT Eval Medium 08521      OT Eval High 55259      OT Re-Eval [id number]            ADL/Self Care 41845     Therapeutic Activities 94637  15     Therapeutic Ex 82671       Orthotic Management 60173       Manual [id number]     Neuro Re-Ed 63084       Non-Billable Time             Evaluation Time additionally includes thorough review of current medical information, gathering information on past medical history/social history and prior level of function, interpretation of standardized testing/informal observation of tasks, assessment of data and development of plan of care and goals.         Evaluating OT: Rangel Davies OTR/L; [id number]

## 2021-07-01 ENCOUNTER — APPOINTMENT (OUTPATIENT)
Dept: GENERAL RADIOLOGY | Age: 86
DRG: 853 | End: 2021-07-01
Payer: MEDICARE

## 2021-07-01 LAB
ALBUMIN SERPL-MCNC: 2.7 G/DL (ref 3.5–5.2)
ALP BLD-CCNC: 92 U/L (ref 35–104)
ALT SERPL-CCNC: 15 U/L (ref 0–32)
ANION GAP SERPL CALCULATED.3IONS-SCNC: 7 MMOL/L (ref 7–16)
AST SERPL-CCNC: 16 U/L (ref 0–31)
BASOPHILS ABSOLUTE: 0.01 E9/L (ref 0–0.2)
BASOPHILS RELATIVE PERCENT: 0.1 % (ref 0–2)
BILIRUB SERPL-MCNC: <0.2 MG/DL (ref 0–1.2)
BUN BLDV-MCNC: 4 MG/DL (ref 6–23)
CALCIUM SERPL-MCNC: 8 MG/DL (ref 8.6–10.2)
CHLORIDE BLD-SCNC: 101 MMOL/L (ref 98–107)
CO2: 27 MMOL/L (ref 22–29)
CREAT SERPL-MCNC: 0.4 MG/DL (ref 0.5–1)
EOSINOPHILS ABSOLUTE: 0.1 E9/L (ref 0.05–0.5)
EOSINOPHILS RELATIVE PERCENT: 1.3 % (ref 0–6)
GFR AFRICAN AMERICAN: >60
GFR NON-AFRICAN AMERICAN: >60 ML/MIN/1.73
GLUCOSE BLD-MCNC: 115 MG/DL (ref 74–99)
HCT VFR BLD CALC: 28.1 % (ref 34–48)
HEMOGLOBIN: 9.4 G/DL (ref 11.5–15.5)
IMMATURE GRANULOCYTES #: 0.03 E9/L
IMMATURE GRANULOCYTES %: 0.4 % (ref 0–5)
LYMPHOCYTES ABSOLUTE: 1.04 E9/L (ref 1.5–4)
LYMPHOCYTES RELATIVE PERCENT: 13.7 % (ref 20–42)
MAGNESIUM: 1.8 MG/DL (ref 1.6–2.6)
MCH RBC QN AUTO: 31.2 PG (ref 26–35)
MCHC RBC AUTO-ENTMCNC: 33.5 % (ref 32–34.5)
MCV RBC AUTO: 93.4 FL (ref 80–99.9)
MONOCYTES ABSOLUTE: 0.8 E9/L (ref 0.1–0.95)
MONOCYTES RELATIVE PERCENT: 10.6 % (ref 2–12)
NEUTROPHILS ABSOLUTE: 5.59 E9/L (ref 1.8–7.3)
NEUTROPHILS RELATIVE PERCENT: 73.9 % (ref 43–80)
PDW BLD-RTO: 12.4 FL (ref 11.5–15)
PHOSPHORUS: 2.6 MG/DL (ref 2.5–4.5)
PLATELET # BLD: 166 E9/L (ref 130–450)
PMV BLD AUTO: 10 FL (ref 7–12)
POTASSIUM SERPL-SCNC: 3.4 MMOL/L (ref 3.5–5)
RBC # BLD: 3.01 E12/L (ref 3.5–5.5)
SODIUM BLD-SCNC: 135 MMOL/L (ref 132–146)
TOTAL PROTEIN: 5.1 G/DL (ref 6.4–8.3)
WBC # BLD: 7.6 E9/L (ref 4.5–11.5)

## 2021-07-01 PROCEDURE — 1200000000 HC SEMI PRIVATE

## 2021-07-01 PROCEDURE — C9113 INJ PANTOPRAZOLE SODIUM, VIA: HCPCS | Performed by: SURGERY

## 2021-07-01 PROCEDURE — 2580000003 HC RX 258: Performed by: SURGERY

## 2021-07-01 PROCEDURE — 84100 ASSAY OF PHOSPHORUS: CPT

## 2021-07-01 PROCEDURE — 6370000000 HC RX 637 (ALT 250 FOR IP): Performed by: INTERNAL MEDICINE

## 2021-07-01 PROCEDURE — 97161 PT EVAL LOW COMPLEX 20 MIN: CPT | Performed by: PHYSICAL THERAPIST

## 2021-07-01 PROCEDURE — 80053 COMPREHEN METABOLIC PANEL: CPT

## 2021-07-01 PROCEDURE — 71045 X-RAY EXAM CHEST 1 VIEW: CPT

## 2021-07-01 PROCEDURE — 97530 THERAPEUTIC ACTIVITIES: CPT | Performed by: PHYSICAL THERAPIST

## 2021-07-01 PROCEDURE — 6370000000 HC RX 637 (ALT 250 FOR IP): Performed by: SURGERY

## 2021-07-01 PROCEDURE — 36592 COLLECT BLOOD FROM PICC: CPT

## 2021-07-01 PROCEDURE — 83735 ASSAY OF MAGNESIUM: CPT

## 2021-07-01 PROCEDURE — 6360000002 HC RX W HCPCS: Performed by: SURGERY

## 2021-07-01 PROCEDURE — 85025 COMPLETE CBC W/AUTO DIFF WBC: CPT

## 2021-07-01 PROCEDURE — 6360000002 HC RX W HCPCS: Performed by: INTERNAL MEDICINE

## 2021-07-01 PROCEDURE — 36415 COLL VENOUS BLD VENIPUNCTURE: CPT

## 2021-07-01 PROCEDURE — 2700000000 HC OXYGEN THERAPY PER DAY

## 2021-07-01 RX ORDER — POTASSIUM CHLORIDE 20 MEQ/1
40 TABLET, EXTENDED RELEASE ORAL ONCE
Status: COMPLETED | OUTPATIENT
Start: 2021-07-01 | End: 2021-07-01

## 2021-07-01 RX ORDER — AMIODARONE HYDROCHLORIDE 200 MG/1
200 TABLET ORAL 2 TIMES DAILY
Status: DISCONTINUED | OUTPATIENT
Start: 2021-07-01 | End: 2021-07-02 | Stop reason: HOSPADM

## 2021-07-01 RX ADMIN — DICLOFENAC SODIUM 2 G: 10 GEL TOPICAL at 10:08

## 2021-07-01 RX ADMIN — Medication 10 ML: at 20:02

## 2021-07-01 RX ADMIN — PANTOPRAZOLE SODIUM 40 MG: 40 INJECTION, POWDER, FOR SOLUTION INTRAVENOUS at 10:08

## 2021-07-01 RX ADMIN — AMIODARONE HYDROCHLORIDE 200 MG: 200 TABLET ORAL at 10:08

## 2021-07-01 RX ADMIN — APIXABAN 5 MG: 5 TABLET, FILM COATED ORAL at 20:01

## 2021-07-01 RX ADMIN — SODIUM CHLORIDE, PRESERVATIVE FREE 10 ML: 5 INJECTION INTRAVENOUS at 10:09

## 2021-07-01 RX ADMIN — CHOLESTYRAMINE 4 G: 4 POWDER, FOR SUSPENSION ORAL at 10:08

## 2021-07-01 RX ADMIN — DICLOFENAC SODIUM 2 G: 10 GEL TOPICAL at 20:13

## 2021-07-01 RX ADMIN — POTASSIUM CHLORIDE 40 MEQ: 1500 TABLET, EXTENDED RELEASE ORAL at 10:08

## 2021-07-01 RX ADMIN — ENOXAPARIN SODIUM 60 MG: 60 INJECTION SUBCUTANEOUS at 10:09

## 2021-07-01 RX ADMIN — FLUCONAZOLE IN SODIUM CHLORIDE 200 MG: 2 INJECTION, SOLUTION INTRAVENOUS at 15:04

## 2021-07-01 RX ADMIN — AMIODARONE HYDROCHLORIDE 200 MG: 200 TABLET ORAL at 20:01

## 2021-07-01 RX ADMIN — CHOLESTYRAMINE 4 G: 4 POWDER, FOR SUSPENSION ORAL at 20:01

## 2021-07-01 RX ADMIN — Medication 10 ML: at 10:10

## 2021-07-01 ASSESSMENT — PAIN SCALES - GENERAL
PAINLEVEL_OUTOF10: 0

## 2021-07-01 ASSESSMENT — PAIN DESCRIPTION - PROGRESSION
CLINICAL_PROGRESSION: NOT CHANGED

## 2021-07-01 NOTE — PROGRESS NOTES
GENERAL SURGERY  DAILY PROGRESS NOTE    Date:2021       Room:[place] [address]  Patient Yolis Sullivan     YOB: 1933     Age:87 y.o. Chief Complaint:  Chief Complaint   Patient presents with    Abdominal Pain     Sent here from  for Abdominal Pain, Back Pain and Bloating. Subjective:  No n/v. Has been to chair. Passed stool. Tolerating some diet (low appetite)    Objective:  BP (!) 141/88   Pulse 83   Temp 98 °F (36.7 °C) (Oral)   Resp 28   Ht 5' (1.524 m)   Wt 135 lb (61.2 kg)   SpO2 96%   BMI 26.37 kg/m²   Temp (24hrs), Av.1 °F (36.7 °C), Min:97.8 °F (36.6 °C), Max:98.4 °F (36.9 °C)      I/O (24Hr):  I/O last 3 completed shifts: In: 694.4 [P.O.:360; I.V.:234.4; IV Piggyback:100]  Out: 1000 [Urine:1000]     GENERAL:  No acute distress. Alert and interactive. LUNGS:  No cough. Nonlabored breathing 2LNC. CARDIOVASC:  Normal rate, no cyanosis. ABDOMEN:  Soft, non-distended, minimally-tender. Incision c/d/i. No guarding / rigidity / rebound. EXTREMITIES:  No edema, no deformities. Assessment:  80 y.o. female with small bowel ischemia s/p  exlap SBR.  intermittent afib RVR    Plan:  - continue diet as tolerated  - replaced hypoK and phos  - stable anemia, monitor daily  - appreciate cardiology recs, currently on amio gtt  - continue PTOT, plan for acute rehab    Electronically signed by Summer William MD on 2021 at 7:19 AM      As above  Diet as tolerated  Continue Questran for diarrhea  Transfer out of ICU when okay with others  Discharge planning anticipate this will take place till after the holiday weekend    Coty Zamarripa MD, MS  Minimally Invasive and Bariatric Surgery  [phone] (p)  2021  10:16 AM

## 2021-07-01 NOTE — PROGRESS NOTES
Internal Medicine Progress Note    [facility]    Almakim Thompsonza. Patricia Ness., F.A.JIMO.I. Veronica Vyas D.O., STEVEOPACO Pineda, MSN, APRN, NP-C  Zoltan Keith. Carol Pierre, MSN, APRN-CNP     Primary Care Physician: Coreen Melo MD   Admitting Physician:  Ronald Myers MD  Admission date and time: 6/26/2021  6:05 PM    Room:  [place] [address]  Admitting diagnosis: Small bowel ischemia [facility]) [K55.9]    Patient Name: Beatrice Beal  MRN: [id number]    Date of Service: 7/1/2021     Subjective:  Mckinley Batista is a 80 y.o. female who was seen and examined today,7/1/2021, at the bedside. Mckinley Batista continues to improve on a daily basis. She is seated at the bedside chair and was evaluated in the presence of her son today. She is feeling dramatically better and is tolerating a diet with adequate bowel movements. She remains in intermittent atrial fibrillation with rapid ventricular response. Review of System:   Constitutional:   Denies fever or chills, weight loss or gain, fatigue or malaise. HEENT:   Denies ear pain, sore throat, sinus or eye problems. Nasal cannula oxygen is in place. Cardiovascular:   Intermittent periods of palpitations. Respiratory:   Shortness of breath is nearly resolved. Most noticeable with exertion. Gastrointestinal:   Ongoing improvement in postoperative abdominal pain. She is tolerating a regular diet. Genitourinary:    Denies any urgency, frequency, hematuria. Voiding with the use of a Torres catheter. Extremities:   Denies lower extremity swelling, edema or cyanosis. Neurology:    Denies any headache or focal neurological deficits, Denies generalized weakness or memory difficulty. Psch:   Denies being anxious or depressed. Musculoskeletal:    Denies  myalgias, joint complaints or back pain. Integumentary:   Denies any rashes, ulcers, or excoriations. Denies bruising.   Hematologic/Lymphatic:   Denies bruising or bleeding. Physical Exam:  No intake/output data recorded. Intake/Output Summary (Last 24 hours) at 7/1/2021 1208  Last data filed at 7/1/2021 0658  Gross per 24 hour   Intake 234.36 ml   Output 1000 ml   Net -765.64 ml   I/O last 3 completed shifts: In: 694.4 [P.O.:360; I.V.:234.4; IV Piggyback:100]  Out: 1000 [Urine:1000]  No data found. Vital Signs:   Blood pressure 123/86, pulse 113, temperature 97.6 °F (36.4 °C), temperature source Oral, resp. rate 20, height 5' (1.524 m), weight 135 lb (61.2 kg), SpO2 93 %. General appearance:  Awake and alert. Very polite and pleasant. Head:  Normocephalic. No masses, lesions or tenderness. Eyes:  PERRLA. EOMI. Sclera clear. Buccal mucosa moist.  ENT:  Ears normal. Mucosa normal.  Nasal cannula oxygen is in place. Neck:    Supple. Trachea midline. No thyromegaly. No JVD. No bruits. Heart:    Atrial fibrillation with intermittent rate control. Lungs:    Better aeration throughout. Abdomen:   Postoperative dressings are in place. Bowel sounds remain hypoactive. Pain to palpation diffusely with voluntary guarding elicited. Extremities:    Peripheral pulses present. No peripheral edema. No ulcers. No cyanosis. No clubbing. Neurologic:    Alert x 3. No focal deficit. Cranial nerves grossly intact. No focal weakness. Psych:   Behavior is normal. Mood appears normal. Speech is not rapid and/or pressured. Musculoskeletal:   Spine ROM normal. Muscular strength intact. Gait not assessed. Integumentary:  No rashes  Skin normal color and texture. Genitalia/Breast:  Voiding with use of a Torres catheter.     Medication:  Scheduled Meds:   amiodarone  200 mg Oral BID    cholestyramine  1 packet Oral BID    enoxaparin  1 mg/kg Subcutaneous BID    sodium chloride flush  10 mL Intravenous 2 times per day    pantoprazole  40 mg Intravenous Daily    And    sodium chloride (PF)  10 mL Intravenous Daily    fluconazole  200 mg Intravenous Q24H     Continuous Infusions:   amiodarone 0.5 mg/min (06/30/21 1656)    sodium chloride         Objective Data:  CBC with Differential:    Lab Results   Component Value Date    WBC 7.6 07/01/2021    RBC 3.01 07/01/2021    HGB 9.4 07/01/2021    HCT 28.1 07/01/2021     07/01/2021    MCV 93.4 07/01/2021    MCH 31.2 07/01/2021    MCHC 33.5 07/01/2021    RDW 12.4 07/01/2021    LYMPHOPCT 13.7 07/01/2021    MONOPCT 10.6 07/01/2021    BASOPCT 0.1 07/01/2021    MONOSABS 0.80 07/01/2021    LYMPHSABS 1.04 07/01/2021    EOSABS 0.10 07/01/2021    BASOSABS 0.01 07/01/2021     BMP:    Lab Results   Component Value Date     07/01/2021    K 3.4 07/01/2021    K 3.8 06/28/2021     07/01/2021    CO2 27 07/01/2021    BUN 4 07/01/2021    LABALBU 2.7 07/01/2021    CREATININE 0.4 07/01/2021    CALCIUM 8.0 07/01/2021    GFRAA >60 07/01/2021    LABGLOM >60 07/01/2021    GLUCOSE 115 07/01/2021       Assessment:  1. Severe sepsis secondary to ischemic small bowel from an internal hernia status post exploratory laparotomy  2. Postoperative respiratory failure with hypoxia status post extubation  3. New onset atrial fibrillation with rapid ventricular response    Plan:   Oleg Arora looks fantastic this morning. She has become increasingly ambulatory. She continues with a variable rate control and is being transitioned to oral amiodarone today. Her son was updated extensively at the bedside. She is tolerating a diet. She would like to be discharged to a skilled nursing facility temporarily for ongoing rehabilitation and this is being arranged with the assistance of the discharge planning team. She is acceptable for transfer from the intensive care unit. Greater than 40 minutes of critical care time was spent with the patient. This time included chart review, , and discussion with those consultants involved in the patient's care.     More than 50% of my  time was spent at the bedside counseling/coordinating care with the patient and/or family with face to face contact. This time was spent reviewing notes and laboratory data as well as instructing and counseling the patient. Time I spent with the family or surrogate(s) is included only if the patient was incapable of providing the necessary information or participating in medical decisions. I also discussed the differential diagnosis and all of the proposed management plans with the patient and individuals accompanying the patient. Mckinley Batista requires this high level of physician care and nursing in the ICU due to the complexity of decision management and chance of rapid decline or death. Continued cardiac monitoring and higher level of nursing are required. I am readily available for any further decision-making and intervention.      Zainab Carver DO, ANEESH.C.O.I.  7/1/2021  12:08 PM

## 2021-07-01 NOTE — PROGRESS NOTES
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 She is able to eat a full diet now and is to be transferred when a bed becomes available. I do not believe she needs any further antibiotics or antimicrobials/antifungals and IV fluconazole will be discontinued. I believe we can also change her IV pantoprazole to oral.    MEDICATIONS:   amiodarone  200 mg Oral BID    apixaban  5 mg Oral BID    cholestyramine  1 packet Oral BID    sodium chloride flush  10 mL Intravenous 2 times per day    pantoprazole  40 mg Intravenous Daily    And    sodium chloride (PF)  10 mL Intravenous Daily    fluconazole  200 mg Intravenous Q24H      sodium chloride       diclofenac sodium, traMADol, perflutren lipid microspheres, sodium chloride flush, sodium chloride      REVIEW OF SYSTEMS:  Constitutional: Denies fever, weight loss, night sweats, and fatigue  Skin: Denies pigmentation, dark lesions, and rashes   HEENT: Denies hearing loss, tinnitus, ear drainage, epistaxis, sore throat, and hoarseness. Cardiovascular: Denies palpitations, chest pain, and chest pressure. Respiratory: Denies cough, dyspnea at rest, hemoptysis, apnea, and choking.   Gastrointestinal: Denies nausea, vomiting, poor appetite, diarrhea, heartburn or reflux  Genitourinary: Denies dysuria, frequency, urgency or hematuria  Musculoskeletal: Denies myalgias, muscle weakness, and bone pain  Neurological: Denies dizziness, vertigo, headache, and focal weakness  Psychological: Denies anxiety and depression  Endocrine: Denies heat intolerance and cold intolerance  Hematopoietic/Lymphatic: Denies bleeding problems and blood transfusions    OBJECTIVE:  Vitals: 07/01/21 1516   BP: 127/71   Pulse: 80   Resp: 18   Temp:    SpO2: 95%     FiO2 : 50 %  O2 Flow Rate (L/min): 2 L/min  O2 Device: None (Room air)    PHYSICAL EXAM:  Constitutional: No fever, chills, diaphoresis  Skin: No skin rash, no skin breakdown  HEENT: Unremarkable  Neck: No JVD, lymphadenopathy, thyromegaly  Cardiovascular: S1, S2 slightly irregular. No S3 murmurs rubs present  Respiratory: Clear to auscultation bilaterally  Gastrointestinal: Softer than yesterday, less pain on palpation  Genitourinary: No CVA tenderness  Extremities: No clubbing, cyanosis, or edema  Neurological: Awake, alert, oriented x3. No evidence of focal motor or sensory deficits  Psychological: In excellent spirits. Very appropriate affect.     LABS:  WBC   Date Value Ref Range Status   07/01/2021 7.6 4.5 - 11.5 E9/L Final   06/30/2021 9.3 4.5 - 11.5 E9/L Final   06/29/2021 11.5 4.5 - 11.5 E9/L Final     Hemoglobin   Date Value Ref Range Status   07/01/2021 9.4 (L) 11.5 - 15.5 g/dL Final   06/30/2021 9.4 (L) 11.5 - 15.5 g/dL Final   06/29/2021 9.7 (L) 11.5 - 15.5 g/dL Final     Hematocrit   Date Value Ref Range Status   07/01/2021 28.1 (L) 34.0 - 48.0 % Final   06/30/2021 28.6 (L) 34.0 - 48.0 % Final   06/29/2021 29.2 (L) 34.0 - 48.0 % Final     MCV   Date Value Ref Range Status   07/01/2021 93.4 80.0 - 99.9 fL Final   06/30/2021 95.7 80.0 - 99.9 fL Final   06/29/2021 95.7 80.0 - 99.9 fL Final     Platelets   Date Value Ref Range Status   07/01/2021 166 130 - 450 E9/L Final   06/30/2021 152 130 - 450 E9/L Final   06/29/2021 141 130 - 450 E9/L Final     Sodium   Date Value Ref Range Status   07/01/2021 135 132 - 146 mmol/L Final   06/30/2021 133 132 - 146 mmol/L Final   06/29/2021 131 (L) 132 - 146 mmol/L Final     Potassium   Date Value Ref Range Status   07/01/2021 3.4 (L) 3.5 - 5.0 mmol/L Final   06/30/2021 3.5 3.5 - 5.0 mmol/L Final   06/29/2021 3.7 3.5 - 5.0 mmol/L Final     Potassium reflex Magnesium   Date Value Ref Range Status   06/28/2021 3.8 3.5 - 5.0 mmol/L Final   06/27/2021 3.9 3.5 - 5.0 mmol/L Final   06/27/2021 3.9 3.5 - 5.0 mmol/L Final     Chloride   Date Value Ref Range Status   07/01/2021 101 98 - 107 mmol/L Final   06/30/2021 99 98 - 107 mmol/L Final   06/29/2021 98 98 - 107 mmol/L Final     CO2   Date Value Ref Range Status   07/01/2021 27 22 - 29 mmol/L Final   06/30/2021 24 22 - 29 mmol/L Final   06/29/2021 25 22 - 29 mmol/L Final     BUN   Date Value Ref Range Status   07/01/2021 4 (L) 6 - 23 mg/dL Final   06/30/2021 6 6 - 23 mg/dL Final   06/29/2021 9 6 - 23 mg/dL Final     CREATININE   Date Value Ref Range Status   07/01/2021 0.4 (L) 0.5 - 1.0 mg/dL Final   06/30/2021 0.4 (L) 0.5 - 1.0 mg/dL Final   06/29/2021 0.5 0.5 - 1.0 mg/dL Final     Glucose   Date Value Ref Range Status   07/01/2021 115 (H) 74 - 99 mg/dL Final   06/30/2021 157 (H) 74 - 99 mg/dL Final   06/29/2021 134 (H) 74 - 99 mg/dL Final     Calcium   Date Value Ref Range Status   07/01/2021 8.0 (L) 8.6 - 10.2 mg/dL Final   06/30/2021 7.9 (L) 8.6 - 10.2 mg/dL Final   06/29/2021 8.1 (L) 8.6 - 10.2 mg/dL Final     Total Protein   Date Value Ref Range Status   07/01/2021 5.1 (L) 6.4 - 8.3 g/dL Final   06/30/2021 4.9 (L) 6.4 - 8.3 g/dL Final   06/29/2021 4.4 (L) 6.4 - 8.3 g/dL Final     Albumin   Date Value Ref Range Status   07/01/2021 2.7 (L) 3.5 - 5.2 g/dL Final   06/30/2021 2.6 (L) 3.5 - 5.2 g/dL Final   06/29/2021 2.4 (L) 3.5 - 5.2 g/dL Final     Total Bilirubin   Date Value Ref Range Status   07/01/2021 <0.2 0.0 - 1.2 mg/dL Final   06/30/2021 0.3 0.0 - 1.2 mg/dL Final   06/29/2021 0.3 0.0 - 1.2 mg/dL Final     Alkaline Phosphatase   Date Value Ref Range Status   07/01/2021 92 35 - 104 U/L Final   06/30/2021 106 (H) 35 - 104 U/L Final   06/29/2021 61 35 - 104 U/L Final     AST   Date Value Ref Range Status   07/01/2021 16 0 - 31 U/L Final     Comment:     Specimen is slightly Hemolyzed. Result may be artificially increased.    06/30/2021 17 0 - 31 U/L PORTABLE   Final Result   Endotracheal tube in good position. Left subclavian central venous catheter tip in the SVC. NG tube in the stomach. No pneumothorax on the right on the left. No acute cardiopulmonary process. XR ABDOMEN FOR NG/OG/NE TUBE PLACEMENT   Final Result   1. Satisfactory position of the NG tube within stomach. CT ABDOMEN PELVIS W IV CONTRAST Additional Contrast? None   Final Result   Findings for segmental small bowel ischemia of entrapped mid jejunal segment   likely due internal herniation/adhesions, forming a closed loop   syndrome-likely with vascular compromise including severe mesenteric edema   and thickening of bowel wall. Preliminary report given to Dr. Kitty Mcclain physician in [facility] at the time of the interpretation. PROBLEM LIST:  Active Problems:    Small bowel ischemia (HCC)  Resolved Problems:    * No resolved hospital problems. *      IMPRESSION:  1. Small bowel resection secondary to internal hernia resulting in 180 cm small bowel resection  2. Atrial fibrillation, now controlled on oral amiodarone  3. Hypokalemia, treated    PLAN:  1. Ambulate further  2. Discontinue antifungal agents (fluconazole)  3. Continue oral amiodarone  4. Transfer to monitored floor  5. Continue Eliquis    ATTESTATION:  ICU Staff Physician note of personal involvement in Care  As the attending physician, I certify that I personally reviewed the patients history and personally examined the patient to confirm the physical findings described above,  And that I reviewed the relevant imaging studies and available reports. I also discussed the differential diagnosis and all of the proposed management plans with the patient and individuals accompanying the patient to this visit. They had the opportunity to ask questions about the proposed management plans and to have those questions answered.      This patient has a high probability of sudden, clinically significant deterioration, which requires the highest level of physician preparedness to intervene urgently. I managed/supervised life or organ supporting interventions that required frequent physician assessment. I devoted my full attention to the direct care of this patient for the amount of time indicated below. Time I spent with the family or surrogate(s) is included only if the patient was incapable of providing the necessary information or participating in medical decisions - Time devoted to teaching and to any procedures I billed separately is not included.     CRITICAL CARE TIME:  30 minutes    Electronically signed by Randy Valera MD on 7/1/2021 at 6:03 PM

## 2021-07-01 NOTE — PLAN OF CARE
Problem: Skin Integrity:  Goal: Will show no infection signs and symptoms  Description: Will show no infection signs and symptoms  7/1/2021 0121 by Lis Rivera RN  Outcome: Met This Shift  6/30/2021 1249 by Adele Romero RN  Outcome: Met This Shift  Goal: Absence of new skin breakdown  Description: Absence of new skin breakdown  7/1/2021 0121 by Lis Rivera RN  Outcome: Met This Shift  6/30/2021 1249 by Adele Romero RN  Outcome: Met This Shift     Problem: Airway Clearance - Ineffective:  Goal: Ability to maintain a clear airway will improve  Description: Ability to maintain a clear airway will improve  Outcome: Met This Shift     Problem: Anxiety/Stress:  Goal: Level of anxiety will decrease  Description: Level of anxiety will decrease  7/1/2021 0121 by Lis Rivera RN  Outcome: Met This Shift  6/30/2021 1249 by Adele Romero RN  Outcome: Met This Shift     Problem: Aspiration:  Goal: Absence of aspiration  Description: Absence of aspiration  7/1/2021 0121 by Lis Rivera RN  Outcome: Met This Shift  6/30/2021 1249 by Adele Romero RN  Outcome: Met This Shift     Problem: Cardiac Output - Decreased:  Goal: Hemodynamic stability will improve  Description: Hemodynamic stability will improve  7/1/2021 0121 by Lis Rivera RN  Outcome: Met This Shift  6/30/2021 1249 by Adele Romero RN  Outcome: Ongoing     Problem: Fluid Volume - Imbalance:  Goal: Absence of imbalanced fluid volume signs and symptoms  Description: Absence of imbalanced fluid volume signs and symptoms  Outcome: Met This Shift     Problem: Gas Exchange - Impaired:  Goal: Levels of oxygenation will improve  Description: Levels of oxygenation will improve  Outcome: Met This Shift     Problem: Mental Status - Impaired:  Goal: Mental status will be restored to baseline  Description: Mental status will be restored to baseline  Outcome: Met This Shift     Problem: Nutrition Deficit:  Goal: Ability to achieve adequate nutritional intake will improve  Description: Ability to achieve adequate nutritional intake will improve  7/1/2021 0121 by Mark Seay RN  Outcome: Met This Shift  6/30/2021 1249 by Araceli Betancourt RN  Outcome: Ongoing     Problem: Pain:  Goal: Pain level will decrease  Description: Pain level will decrease  Outcome: Met This Shift  Goal: Recognizes and communicates pain  Description: Recognizes and communicates pain  Outcome: Met This Shift  Goal: Control of acute pain  Description: Control of acute pain  Outcome: Met This Shift  Goal: Control of chronic pain  Description: Control of chronic pain  Outcome: Met This Shift

## 2021-07-01 NOTE — CARE COORDINATION
7-1-Cm note: ( no covid testing) pending response from [facility] on acceptance, per liaison this is testing day at the facility so they are behid on referrals, pt's son Micheline Kerntist aware.  Electronically signed by Herson Bertrand RN on 7/1/2021 at 3:36 PM

## 2021-07-01 NOTE — PROGRESS NOTES
Physical Therapy Initial Evaluation/Plan of Care    Room #:  IC05/IC05-01  Patient Name: Margareth Benitez  YOB: 1933  MRN: [id number]    Date of Service: 7/1/2021     Tentative placement recommendation: Subacute vs Home Health Physical Therapy if patient meets goals  And has adequate home support. Equipment recommendation: None      Evaluating Physical Therapist: Candace Hardwick, PT, DPT     Specific Provider Orders/Date/Referring Provider :   06/30/21 1345   PT eval and treat Start: 06/30/21 1345, End: 06/30/21 1345, ONE TIME, Standing Count: 1 Occurrences, R    Vicki Cruz DO Acknowledge New    Admitting Diagnosis:   Small bowel ischemia ([facility] [address] [K55.9]       Surgery: Exploratory Laparotomy, small bowel resection    Patient Active Problem List   Diagnosis    Small bowel ischemia ([facility] [address]       ASSESSMENT of Current Deficits Patient exhibits decreased strength, balance, endurance and range of motion impairing functional mobility, transfers, gait , gait distance and tolerance to activity Pt demonstrated decreased marcela and stride length during ambulation and was slightly unsteady throughout with mild SOB, no dizziness, or significant LOB. Pt required rest breaks in between activities but was able to have her O2 remain above 92 throughout function and ambulation on room air.         PHYSICAL THERAPY  PLAN OF CARE       Physical therapy plan of care is established based on physician order,  patient diagnosis and clinical assessment    Current Treatment Recommendations:    -Bed Mobility: Lower extremity exercises  and Trunk control activities   -Sitting Balance: Incorporate reaching activities to activate trunk muscles , Hands on support to maintain midline , Facilitate active trunk muscle engagement , Facilitate postural control in all planes  and Engage in core activities to allow for movement within base of support   -Standing Balance: Perform strengthening exercises in standing to promote motor control with or without upper extremity support , Instruct patient on adequate base of support to maintain balance and Challenge balance utilizing reaching  activities beyond center of gravity    -Transfers: Provide instruction on proper hand and foot position for adequate transfer of weight onto lower extremities and use of gait device, Cues for hand placement, technique and safety, Facilitate weight shift forward on to lower extremities and provide necessary stabilization of bilateral lower extremities , Support transfer of weight on to lower extremities, Assist with extension of knees trunk and hip to accept weight transfer  and Provide stabilization to prevent fall   -Gait: Gait training, Standing activities to improve: base of support, weight shift, weight bearing , Exercises to improve trunk control, Exercises to improve hip and knee control, Performance of protected weight bearing activities and Activities to increase weight bearing   -Endurance: Utilize Supervised activities to increase level of endurance to allow for safe functional mobility including transfers and gait  and Use graduated activities to promote good breathing techniques and provide support and education to maximize respiratory function    PT long term treatment goals are located in below grid    Patient and or family understand(s) diagnosis, prognosis, and plan of care. Frequency of treatments: Patient will be seen  1-2x daily. Prior Level of Function: Patient ambulated independently   Rehab Potential: fair  + for baseline    Past medical history:   History reviewed. No pertinent past medical history. Past Surgical History:   Procedure Laterality Date    CHOLECYSTECTOMY      COLECTOMY N/A 6/27/2021    EXPLORATORY LAPAROTOMY, SMALL BOWEL RESECTION, TRIPLE LUMEN CENTRAL VENOUS CATHETER INSERTION performed by Nicole Frazier MD at [address] [facility] [address]:    Precautions:  Up with assistance, falls   Social history: Patient lives alone in a ranch home  with 2 steps  to enter bilateral [employer] in shower grab bars    Equipment owned: Wheelchair, Daneil Fannin and [employer],    [address]   17/24    AM-[facility] Mobility Inpatient   How much difficulty turning over in bed?: A Little  How much difficulty sitting down on / standing up from a chair with arms?: A Little  How much difficulty moving from lying on back to sitting on side of bed?: A Little  How much help from another person moving to and from a bed to a chair?: A Little  How much help from another person needed to walk in hospital room?: A Little  How much help from another person for climbing 3-5 steps with a railing?: A Lot  AM-PAC Inpatient Mobility Raw Score : 17  AM-PAC Inpatient T-Scale Score : 42.13  Mobility Inpatient CMS 0-100% Score: 50.57  Mobility Inpatient CMS G-Code Modifier : CK    Nursing cleared patient for PT evaluation. The admitting diagnosis and active problem list as listed above have been reviewed prior to the initiation of this evaluation. OBJECTIVE;   Initial Evaluation  Date: 7/1/2021 Treatment Date:     Short Term/ Long Term   Goals   Was pt agreeable to Eval/treatment? Yes   To be met in 4 days   Pain level   1/10  abdomen     Bed Mobility    Rolling: Not assessed    Supine to sit: Not assessed    Sit to supine: Not assessed    Scooting: Not assessed    Rolling: Independent   Supine to sit: Independent   Sit to supine: Independent   Scooting: Independent    Transfers Sit to stand:  Moderate assist of 1   Sit to stand: Independent    Ambulation    45 feet using  wheeled walker with Minimal assist of 1      > 150 feet using  wheeled walker with Independent    Stair negotiation: ascended and descended   Not assessed      2 steps with 2 HR   ROM Within functional limits    Increase range of motion 10% of affected joints    Strength BUE:  refer to OT eval  RLE:  4-/5  LLE:  4-/5  Increase strength in affected mm groups by 1/3 grade   Balance Sitting EOB:  fair +  Dynamic Standing:  fair   Sitting EOB:  good   Dynamic Standing: fair +     Patient is Alert & Oriented x person, place, time and situation and follows directions   Sensation:  Patient  denies numbness/tingling   Edema:  none noted   Endurance: fair     Vitals: room air   Blood Pressure at rest  Blood Pressure during session    Heart Rate at rest 100-120 Heart Rate during session 100-120   SPO2 at rest 93 on RA, 97 on 2L of O2%  SPO2 during session 93-97%     Patient education  Patient educated on role of Physical Therapy, risks of immobility, safety and plan of care, energy conservation, importance of positional changes for oxygen exchange,  importance of mobility while in hospital , purse lip breathing, safety  and proper use/technique of incentive spirometer     Patient response to education:   Pt verbalized understanding Pt demonstrated skill Pt requires further education in this area   Yes Partial Yes      Treatment:  Patient practiced and was instructed/facilitated in the following treatment: Patient  Sat edge of bed 10 minutes with Supervision  to increase dynamic sitting balance and activity tolerance. Pt ambulated 45' x 3 with [employer] and Eugenia. Pt educated about PLB and importance of energy conservation. Therapeutic Exercises:  not performed      At end of session, patient in chair with call light and phone within reach, all lines and tubes intact, nursing notified. Patient would benefit from continued skilled Physical Therapy to improve functional independence and quality of life.          Patient's/ family goals   get stronger      Time in  849  Time out  925    Total Treatment Time  15 minutes    Evaluation time includes thorough review of current medical information, gathering information on past medical history/social history and prior level of function, completion of standardized testing/informal observation of tasks, assessment of data, and development of Plan of care and goals.     CPT codes:  Low Complexity PT evaluation (81971)  Therapeutic activities (33997)   15 minutes  1 unit(s)    Akanksha Edmonds PT

## 2021-07-02 VITALS
BODY MASS INDEX: 29.09 KG/M2 | SYSTOLIC BLOOD PRESSURE: 138 MMHG | HEIGHT: 60 IN | OXYGEN SATURATION: 95 % | RESPIRATION RATE: 18 BRPM | TEMPERATURE: 98.4 F | HEART RATE: 110 BPM | WEIGHT: 148.2 LBS | DIASTOLIC BLOOD PRESSURE: 74 MMHG

## 2021-07-02 LAB
ALBUMIN SERPL-MCNC: 2.8 G/DL (ref 3.5–5.2)
ALP BLD-CCNC: 106 U/L (ref 35–104)
ALT SERPL-CCNC: 20 U/L (ref 0–32)
ANION GAP SERPL CALCULATED.3IONS-SCNC: 10 MMOL/L (ref 7–16)
AST SERPL-CCNC: 21 U/L (ref 0–31)
BASOPHILS ABSOLUTE: 0.02 E9/L (ref 0–0.2)
BASOPHILS RELATIVE PERCENT: 0.2 % (ref 0–2)
BILIRUB SERPL-MCNC: 0.3 MG/DL (ref 0–1.2)
BUN BLDV-MCNC: 5 MG/DL (ref 6–23)
CALCIUM SERPL-MCNC: 8.2 MG/DL (ref 8.6–10.2)
CHLORIDE BLD-SCNC: 99 MMOL/L (ref 98–107)
CO2: 25 MMOL/L (ref 22–29)
CREAT SERPL-MCNC: 0.4 MG/DL (ref 0.5–1)
EOSINOPHILS ABSOLUTE: 0.07 E9/L (ref 0.05–0.5)
EOSINOPHILS RELATIVE PERCENT: 0.8 % (ref 0–6)
GFR AFRICAN AMERICAN: >60
GFR NON-AFRICAN AMERICAN: >60 ML/MIN/1.73
GLUCOSE BLD-MCNC: 108 MG/DL (ref 74–99)
HCT VFR BLD CALC: 30.5 % (ref 34–48)
HEMOGLOBIN: 9.9 G/DL (ref 11.5–15.5)
IMMATURE GRANULOCYTES #: 0.03 E9/L
IMMATURE GRANULOCYTES %: 0.3 % (ref 0–5)
LYMPHOCYTES ABSOLUTE: 0.94 E9/L (ref 1.5–4)
LYMPHOCYTES RELATIVE PERCENT: 10.6 % (ref 20–42)
MAGNESIUM: 1.8 MG/DL (ref 1.6–2.6)
MCH RBC QN AUTO: 31.1 PG (ref 26–35)
MCHC RBC AUTO-ENTMCNC: 32.5 % (ref 32–34.5)
MCV RBC AUTO: 95.9 FL (ref 80–99.9)
MONOCYTES ABSOLUTE: 0.99 E9/L (ref 0.1–0.95)
MONOCYTES RELATIVE PERCENT: 11.1 % (ref 2–12)
NEUTROPHILS ABSOLUTE: 6.84 E9/L (ref 1.8–7.3)
NEUTROPHILS RELATIVE PERCENT: 77 % (ref 43–80)
PDW BLD-RTO: 12.4 FL (ref 11.5–15)
PHOSPHORUS: 3.3 MG/DL (ref 2.5–4.5)
PLATELET # BLD: 231 E9/L (ref 130–450)
PMV BLD AUTO: 10.1 FL (ref 7–12)
POTASSIUM SERPL-SCNC: 3.4 MMOL/L (ref 3.5–5)
RBC # BLD: 3.18 E12/L (ref 3.5–5.5)
SARS-COV-2, NAAT: NOT DETECTED
SODIUM BLD-SCNC: 134 MMOL/L (ref 132–146)
TOTAL PROTEIN: 5.5 G/DL (ref 6.4–8.3)
WBC # BLD: 8.9 E9/L (ref 4.5–11.5)

## 2021-07-02 PROCEDURE — 80053 COMPREHEN METABOLIC PANEL: CPT

## 2021-07-02 PROCEDURE — 6370000000 HC RX 637 (ALT 250 FOR IP): Performed by: SURGERY

## 2021-07-02 PROCEDURE — 87635 SARS-COV-2 COVID-19 AMP PRB: CPT

## 2021-07-02 PROCEDURE — 97116 GAIT TRAINING THERAPY: CPT

## 2021-07-02 PROCEDURE — 84100 ASSAY OF PHOSPHORUS: CPT

## 2021-07-02 PROCEDURE — 6370000000 HC RX 637 (ALT 250 FOR IP): Performed by: INTERNAL MEDICINE

## 2021-07-02 PROCEDURE — 36415 COLL VENOUS BLD VENIPUNCTURE: CPT

## 2021-07-02 PROCEDURE — 85025 COMPLETE CBC W/AUTO DIFF WBC: CPT

## 2021-07-02 PROCEDURE — 99238 HOSP IP/OBS DSCHRG MGMT 30/<: CPT | Performed by: SURGERY

## 2021-07-02 PROCEDURE — 83735 ASSAY OF MAGNESIUM: CPT

## 2021-07-02 PROCEDURE — 2700000000 HC OXYGEN THERAPY PER DAY

## 2021-07-02 PROCEDURE — 36592 COLLECT BLOOD FROM PICC: CPT

## 2021-07-02 PROCEDURE — 97535 SELF CARE MNGMENT TRAINING: CPT

## 2021-07-02 PROCEDURE — 2580000003 HC RX 258: Performed by: SURGERY

## 2021-07-02 PROCEDURE — 6360000002 HC RX W HCPCS: Performed by: SURGERY

## 2021-07-02 RX ORDER — TRAMADOL HYDROCHLORIDE 50 MG/1
50 TABLET ORAL EVERY 6 HOURS PRN
Qty: 12 TABLET | Refills: 0 | Status: SHIPPED | DISCHARGE
Start: 2021-07-02 | End: 2021-07-05

## 2021-07-02 RX ORDER — CHOLESTYRAMINE 4 G/9G
1 POWDER, FOR SUSPENSION ORAL 2 TIMES DAILY
Qty: 90 PACKET | Refills: 3 | Status: SHIPPED | OUTPATIENT
Start: 2021-07-02 | End: 2021-07-02

## 2021-07-02 RX ORDER — MAGNESIUM SULFATE IN WATER 40 MG/ML
2000 INJECTION, SOLUTION INTRAVENOUS ONCE
Status: COMPLETED | OUTPATIENT
Start: 2021-07-02 | End: 2021-07-02

## 2021-07-02 RX ORDER — POTASSIUM CHLORIDE 20 MEQ/1
40 TABLET, EXTENDED RELEASE ORAL ONCE
Status: COMPLETED | OUTPATIENT
Start: 2021-07-02 | End: 2021-07-02

## 2021-07-02 RX ORDER — AMIODARONE HYDROCHLORIDE 200 MG/1
200 TABLET ORAL 2 TIMES DAILY
DISCHARGE
Start: 2021-07-02 | End: 2022-09-07

## 2021-07-02 RX ORDER — CHOLESTYRAMINE 4 G/9G
1 POWDER, FOR SUSPENSION ORAL 2 TIMES DAILY
Qty: 90 PACKET | Refills: 3 | DISCHARGE
Start: 2021-07-02 | End: 2022-09-07

## 2021-07-02 RX ADMIN — APIXABAN 5 MG: 5 TABLET, FILM COATED ORAL at 09:56

## 2021-07-02 RX ADMIN — AMIODARONE HYDROCHLORIDE 200 MG: 200 TABLET ORAL at 09:56

## 2021-07-02 RX ADMIN — Medication 10 ML: at 09:57

## 2021-07-02 RX ADMIN — POTASSIUM CHLORIDE 40 MEQ: 1500 TABLET, EXTENDED RELEASE ORAL at 09:56

## 2021-07-02 RX ADMIN — CHOLESTYRAMINE 4 G: 4 POWDER, FOR SUSPENSION ORAL at 09:56

## 2021-07-02 RX ADMIN — MAGNESIUM SULFATE HEPTAHYDRATE 2000 MG: 40 INJECTION, SOLUTION INTRAVENOUS at 09:57

## 2021-07-02 ASSESSMENT — PAIN SCALES - GENERAL
PAINLEVEL_OUTOF10: 0

## 2021-07-02 NOTE — PROGRESS NOTES
Physical Therapy Treatment Note/Plan of Care    Room #:  5378/8707-56  Patient Name: Estevan Ricks  YOB: 1933  MRN: [id number]    Date of Service: 7/2/2021     Tentative placement recommendation: Subacute vs Home Health Physical Therapy if patient meets goals  And has adequate home support. Equipment recommendation: None      Evaluating Physical Therapist: Akanksha Edmonds, PT, DPT     Specific Provider Orders/Date/Referring Provider :   06/30/21 1345   PT eval and treat Start: 06/30/21 1345, End: 06/30/21 1345, ONE TIME, Standing Count: 1 Occurrences, R    Nicole Salinas,  Acknowledge New    Admitting Diagnosis:   Small bowel ischemia ([facility] [address] [K55.9]       Surgery: Exploratory Laparotomy, small bowel resection    Patient Active Problem List   Diagnosis    Small bowel ischemia ([facility] [address]       ASSESSMENT of Current Deficits Patient exhibits decreased strength, balance, endurance and range of motion impairing functional mobility, transfers, gait , gait distance and tolerance to activity Pt demonstrated decreased base of support and scissoring gait at times with cueing to correct to improve safety. Pt able to progress with increased distance and no loss of balance using a wheeled walker.       PHYSICAL THERAPY  PLAN OF CARE       Physical therapy plan of care is established based on physician order,  patient diagnosis and clinical assessment    Current Treatment Recommendations:    -Bed Mobility: Lower extremity exercises  and Trunk control activities   -Sitting Balance: Incorporate reaching activities to activate trunk muscles , Hands on support to maintain midline , Facilitate active trunk muscle engagement , Facilitate postural control in all planes  and Engage in core activities to allow for movement within base of support   -Standing Balance: Perform strengthening exercises in standing to promote motor control with or without upper extremity support , Instruct patient on adequate base of support to maintain balance and Challenge balance utilizing reaching  activities beyond center of gravity    -Transfers: Provide instruction on proper hand and foot position for adequate transfer of weight onto lower extremities and use of gait device, Cues for hand placement, technique and safety, Facilitate weight shift forward on to lower extremities and provide necessary stabilization of bilateral lower extremities , Support transfer of weight on to lower extremities, Assist with extension of knees trunk and hip to accept weight transfer  and Provide stabilization to prevent fall   -Gait: Gait training, Standing activities to improve: base of support, weight shift, weight bearing , Exercises to improve trunk control, Exercises to improve hip and knee control, Performance of protected weight bearing activities and Activities to increase weight bearing   -Endurance: Utilize Supervised activities to increase level of endurance to allow for safe functional mobility including transfers and gait  and Use graduated activities to promote good breathing techniques and provide support and education to maximize respiratory function    PT long term treatment goals are located in below grid    Patient and or family understand(s) diagnosis, prognosis, and plan of care. Frequency of treatments: Patient will be seen  1-2x daily. Prior Level of Function: Patient ambulated independently   Rehab Potential: fair  + for baseline    Past medical history:   History reviewed. No pertinent past medical history. Past Surgical History:   Procedure Laterality Date    CHOLECYSTECTOMY      COLECTOMY N/A 6/27/2021    EXPLORATORY LAPAROTOMY, SMALL BOWEL RESECTION, TRIPLE LUMEN CENTRAL VENOUS CATHETER INSERTION performed by Elissa Helm MD at [address] [facility] [address]:    Precautions:  Up with assistance, falls   Social history: Patient lives alone in a ranch home  with 2 steps  to enter bilateral Rail  Walk in shower grab bars    Equipment owned: Wheelchair, Wheeled Walker and [employer],    [address] Basic Mobility   17/24    AM-PAC Mobility Inpatient   How much difficulty turning over in bed?: A Little  How much difficulty sitting down on / standing up from a chair with arms?: A Little  How much difficulty moving from lying on back to sitting on side of bed?: A Little  How much help from another person moving to and from a bed to a chair?: A Little  How much help from another person needed to walk in hospital room?: A Little  How much help from another person for climbing 3-5 steps with a railing?: A Lot  AM-PAC Inpatient Mobility Raw Score : 17  AM-PAC Inpatient T-Scale Score : 42.13  Mobility Inpatient CMS 0-100% Score: 50.57  Mobility Inpatient CMS G-Code Modifier : CK    Nursing cleared patient for PT treatment. .     OBJECTIVE;   Initial Evaluation  Date: 7/1/2021 Treatment Date:  7/2/2021       Short Term/ Long Term   Goals   Was pt agreeable to Eval/treatment? Yes  yes To be met in 4 days   Pain level   1/10  abdomen 0/10    Bed Mobility    Rolling: Not assessed    Supine to sit: Not assessed    Sit to supine: Not assessed    Scooting: Not assessed   Rolling: Not assessed    Supine to sit: Not assessed    Sit to supine: Not assessed    Scooting: Not assessed     Rolling: Independent   Supine to sit: Independent   Sit to supine: Independent   Scooting: Independent    Transfers Sit to stand: Moderate assist of 1  Sit to stand: Minimal assist of 1      Sit to stand: Independent    Ambulation    45 feet using  wheeled walker with Minimal assist of 1    2 x 110 feet using  wheeled walker with Minimal assist of 1   Cues for increased base of support, pacing, and safety.     > 150 feet using  wheeled walker with Independent    Stair negotiation: ascended and descended   Not assessed      2 steps with 2 HR   ROM Within functional limits    Increase range of motion 10% of affected joints    Strength BUE:  refer to OT eval  RLE: 4-/5  LLE:  4-/5  Increase strength in affected mm groups by 1/3 grade   Balance Sitting EOB:  fair +  Dynamic Standing:  fair  Sitting EOB: good   Dynamic Standing: fair wheeled walker   Sitting EOB:  good   Dynamic Standing: fair +     Patient is Alert & Oriented x person, place, time and situation and follows directions   Sensation:  Patient  denies numbness/tingling   Edema:  none noted   Endurance: fair     Vitals: room air   Blood Pressure at rest  Blood Pressure during session    Heart Rate at rest  Heart Rate during session    SPO2 at rest % SPO2 during session %     Patient education  Patient educated on role of Physical Therapy, risks of immobility, safety and plan of care, energy conservation, importance of positional changes for oxygen exchange,  importance of mobility while in hospital , purse lip breathing, safety  and proper use/technique of incentive spirometer     Patient response to education:   Pt verbalized understanding Pt demonstrated skill Pt requires further education in this area   Yes Partial Yes      Treatment:  Patient practiced and was instructed/facilitated in the following treatment: Patient  sitting in a chair at start of tx session. Pt stood, ambulated in the hallway, and back to the chair. Therapeutic Exercises:  not performed      At end of session, patient in chair with call light and phone within reach, all lines and tubes intact, nursing notified. Patient would benefit from continued skilled Physical Therapy to improve functional independence and quality of life. Patient's/ family goals   get stronger      Time in  1:29  Time out  1:39    Total Treatment Time  10 minutes        CPT codes:    Gait Training (62333) 10 minutes 1 unit(s)   Anup Lopez  [facility]  LIC # [id number]

## 2021-07-02 NOTE — CARE COORDINATION
Ss note:7/2/2021.2:57 PM Awaiting Rapid Covid test result. Per Sanjay An at [facility] has been obtained and is valid for 48 hours. Pt CAN admit to facility today once rapid test result returns, will await discharge order and covid result. SW met with pt to update her, she relays her son Ed will be able to transport her via car at discharge, he will be coming in to visit around 5 pm, charge nurse updated. Hens completed, JORGE has been signed.  PIPO Morel

## 2021-07-02 NOTE — PROGRESS NOTES
for self-care routine  * Functional transfer/mobility training/DME recommendations for increased independence, safety, and fall prevention  * Patient/Family education to increase follow through with safety techniques and functional independence  * Recommendation of environmental modifications for increased safety with functional transfers/mobility and ADLs  * Therapeutic exercise to improve motor endurance, ROM, and functional strength for ADLs/functional transfers  * Therapeutic activities to facilitate/challenge dynamic balance, stand tolerance for increased safety and independence with ADLs  * Positioning to improve skin integrity, interaction with environment and functional independence     Recommended Adaptive Equipment: TBD  - Possible LH AE for LE dressing, bathing, continue to assess     Home Living: alone; single family home, 1 story, 3 steps to enter with no rail, 2 steps from garage with rail, walk in shower        Equipment owned: wheeled walker, grab bars, O2      Prior Level of Function: Independent with ADLs , Independent with IADLs; ambulated no device     Driving: yes  Occupation: involved in a [employer]      Pain Level: \"a little bit\"  pain in abdomen; Nursing aware               Cognition: A&O: 4/4; Follows 1 step directions              Memory: good               Sequencing: good               Problem solving: good               Judgement/safety: good      [facility]   AM-PAC Daily Activity Inpatient   How much help for putting on and taking off regular lower body clothing?: A Lot  How much help for Bathing?: A Lot  How much help for Toileting?: A Lot  How much help for putting on and taking off regular upper body clothing?: A Little  How much help for taking care of personal grooming?: A Little  How much help for eating meals?: None  AM-[facility] Inpatient Daily Activity Raw Score: 16  AM-PAC Inpatient ADL T-Scale Score : 35.96  ADL Inpatient CMS 0-100% Score: 53.32  ADL Inpatient CMS G-Code Modifier : CK                Functional Assessment:     Initial Eval Status  Date: 6/30/21 Treatment Status  Date:7/2/2021 STGs = LTGs  Time frame: 10-14 days   Feeding Independent  N/T  Independent    Grooming Minimal Assist   pt able to flex head anteriorly to comb hair with pick; Min A to assist with tangles in back of head; decreased ROM in R shoulder; pt able to wash face with set up assist Independent    UB Dressing Minimal Assist   Min A to change gowns and tie/untie back of gown d/t limited ROM on R shoulder; pt able to thread B UE's through gown; assist for IV and monitor line management Independent    LB Dressing Maximal Assist to don incontinent garment at edge of bed and stood with assistance to pull up around hips and waist  Max A to change socks and to don incontinence garment and pants over B feet when seated in chair d/t abdominal discomfort; pt may benefit from [facility] for LE dressing ; continue to assess Supervision    Bathing Maximal Assist Mod A ; pt able to wash UB when seated in chair ; min A For balance as pt completed pericare and washed buttocks; pt able to wash proximal LE's when seated in chair; assist to wash distal LE's and feet d/t abdominal discomfort; assist to wash back when seated in chair  Minimal Assist    Toileting Maximal Assist   Mod A for sit to stand from chair for placement of bedpan on chair per pt request; slight incontinence of bowel upon standing; mod A for hygiene; pt wished to stay on bedpan for a little while; nsg notified and call light within reach Minimal Assist    Bed Mobility  Supine to sit: Moderate Assist   Sit to supine: N/T as pt was up in chair  Min A for supine to sit with increased time; min A for scooting with cuing for hand placement; sit to supine N/T as pt seated in chair with alarm on  Supine to sit: Independent   Sit to supine: Independent    Functional Transfers Moderate Assist from EOB to wheeled walker.    Transfer training with verbal cues for hand placement throughout session to improve safety.   Mod A for sit to stand from EOB with use of FWW; cuing for hand placement and upright position; transfer to/from chair x 3 reps during bathing, toileting and transfers with min A with FWW; cuing for hand placement Independent    Functional Mobility Minimal Assist with wheeled walker within the room, household distance, verbal cues for walker sequence and safety. MIn A with FWW for less than household distances from EOB to [place]    Balance Sitting:     Static: fair     Dynamic: fair   Standing: fair  with wheeled walker S itting:     Static: fair     Dynamic: fair   Standing: fair  with wheeled walker     Activity Tolerance fair  fair  good    Visual/  Perceptual Glasses: yes                       Hand Dominance: right      Hearing: WFL   Sensation:  No c/o numbness or tingling  Tone: WFL   Edema: no     Comments: Patient cleared by nursing staff. Upon arrival pt supine in bed. Pt agreeable to OT tx session. Pt educated with regards to bed mobility, hand placement, safety awareness, static sitting balance,  standing balance, transfer training, functional mobility, ADL retraining,  grooming tasks, UE/LE bathing, LE/UE dressing, toileting/hygiene, ECT's. At end of session pt seated in chair with alarm on  with all lines and tubes intact, call light within reach. Overall, pt demonstrated decreased independence and safety during completion of ADL/functional transfers/mobility tasks. Pt would benefit from continued skilled OT to increase safety and independence with completion of ADL/IADL tasks for functional independence and quality of life. Pt required cues and education as noted above for safe facilitation and completion of tasks. Therapist provided skilled monitoring of patient's response during treatment session.  Prior to and at the end of session, environmental modifications / line management completed for patients safety and efficiency of treatment session. Overall, patient demonstrates minimal/moderate difficulties with completion of BADLs and IADLs. Factors contributing to these difficulties include bowel incontinence, abdominal discomfort,  decreased endurance, and generalized weakness. As noted above, patient likely to benefit from further OT intervention to increase independence, safety, and overall quality of life. Treatment:     ? Bed mobility: Facilitated bed mobility with cues for proper body mechanics and sequencing to prepare for ADL completion. ? Functional transfers: Facilitated transfers from various surfaces with cues for body alignment, safety and hand placement. ? ADL completion: Self-care retraining for the above-mentioned ADLs; training on proper hand placement, safety technique, sequencing, and energy conservation techniques. ? Postural Balance: Sitting/standing balance retraining to improve righting reactions with postural changes during ADLs. · Pt has made fair progress towards set goals   · OT 1-3x/week for 5-7 days during hospitalization       Treatment Time also includes thorough review of current medical information, gathering information on past medical history/social history and prior level of function, informal observation of tasks, assessment of data and education on plan of care and goals.     Treatment Time In: 12:17 PM     Treatment Time Out:12:49 PM            Treatment Charges: Mins Units   ADL/Home Mgt     [id number]   Thera Activities     [id number] 7 0   Ther Ex                 [id number]       Manual Therapy    [id number]     Neuro Re-ed         85515     Orthotic manage/training                               [id number]     Non Billable Time     Total Timed Treatment 32 610 [address]

## 2021-07-02 NOTE — CARE COORDINATION
7-2- ( no covid testing) pt has been accepted to [facility] , she will require precert (started precert today) pt will require a negative Rapid Covid test prior to discharge , spoke to daughter in law Aníbal Patricio, family is aware. Will await precert, will need a signed JORGE, HENS, and updated PT/OT notes.  Electronically signed by Maybelle Sacks, RN on 7/2/2021 at 8:55 AM

## 2021-07-02 NOTE — DISCHARGE INSTR - COC
Continuity of Care Form    Patient Name: Alix Saba   :  1933  MRN:  [id number]    Admit date:  2021  Discharge date:  21    Code Status Order: Full Code   Advance Directives:   [facility] Documentation       Date/Time Healthcare Directive Type of Healthcare Directive Copy in [facility] [address] Agent's Name Healthcare Agent's Phone Number    21 1832  No, patient does not have an advance directive for healthcare treatment -- -- -- -- --            Admitting Physician:  Paulina Primrose, MD  PCP: Lindsey Ritchie MD    Discharging Nurse: [facility] Unit/Room#: 1493/0058-96  Discharging Unit Phone Number: [phone]    Emergency Contact:   Extended Emergency Contact Information  Primary Emergency Contact: Tk Hammond  Address: [address] [facility] [place], [address] [place] [address] Phone: [phone]  Mobile Phone: [phone]  Relation: Child  Secondary Emergency Contact: Diana Hammond   [address] Phone: [phone]  Mobile Phone: [phone]  Relation: Other    Past Surgical History:  Past Surgical History:   Procedure Laterality Date    CHOLECYSTECTOMY      COLECTOMY N/A 2021    EXPLORATORY LAPAROTOMY, SMALL BOWEL RESECTION, TRIPLE LUMEN CENTRAL VENOUS CATHETER INSERTION performed by Paulina Primrose, MD at [facility]         Immunization History:   Immunization History   Administered Date(s) Administered    COVID-19, 95 HANNA Lund, 100mcg/0.5mL 2021, 2021       Active Problems:  Patient Active Problem List   Diagnosis Code    Small bowel ischemia ([facility] [address] K55.9       Isolation/Infection:   Isolation            No Isolation          Patient Infection Status       Infection Onset Added Last Indicated Last Indicated By Review Planned Expiration Resolved Resolved By    None active    Resolved    C-diff Rule Out 21 CLOSTRIDIUM DIFFICILE EIA (Ordered)   06/30/21 Rule-Out Test Resulted            Nurse Assessment:  Last Vital Signs: /74   Pulse 110   Temp 98.4 °F (36.9 °C) (Oral)   Resp 18   Ht 5' (1.524 m)   Wt 148 lb 3.2 oz (67.2 kg)   SpO2 95%   BMI 28.94 kg/m²     Last documented pain score (0-10 scale): Pain Level: 0  Last Weight:   Wt Readings from Last 1 Encounters:   07/02/21 148 lb 3.2 oz (67.2 kg)     Mental Status:  oriented and alert    IV Access:  - None    Nursing Mobility/ADLs:  Walking   Assisted  Transfer  Assisted  Bathing  Assisted  Dressing  Assisted  Toileting  Assisted  Feeding  Assisted  Med Admin  Assisted  Med Delivery   whole    Wound Care Documentation and Therapy:        Elimination:  Continence:   · Bowel: No  · Bladder: No  Urinary Catheter: None   Colostomy/Ileostomy/Ileal Conduit: No       Date of Last BM: 7/2/21    Intake/Output Summary (Last 24 hours) at 7/2/2021 0856  Last data filed at 7/1/2021 2158  Gross per 24 hour   Intake 490 ml   Output --   Net 490 ml     I/O last 3 completed shifts: In: 56 [P.O.:490]  Out: -     Safety Concerns: At Risk for Falls    Impairments/Disabilities:      None    Nutrition Therapy:  Current Nutrition Therapy:   - Oral Diet:  General    Routes of Feeding: Oral  Liquids: Thin Liquids  Daily Fluid Restriction: no  Last Modified Barium Swallow with Video (Video Swallowing Test): not done    Treatments at the Time of Hospital Discharge:   Respiratory Treatments: n/a    Oxygen Therapy:  is not on home oxygen therapy.   Ventilator:    - No ventilator support    Rehab Therapies: Physical Therapy and Occupational Therapy  Weight Bearing Status/Restrictions: No weight bearing restirctions  Other Medical Equipment (for information only, NOT a DME order):  walker  Other Treatments: ***    Patient's personal belongings (please select all that are sent with patient):  Glasses, Hearing Aides bilateral    RN SIGNATURE:  Electronically signed by Nidhi Washington RN on 7/2/21 at 3:35 PM EDT    CASE MANAGEMENT/SOCIAL WORK SECTION    Inpatient Status Date: 6-    Readmission Risk Assessment Score:  Readmission Risk              Risk of Unplanned Readmission:  13           Discharging to Facility/ Agency   · Name: [facility] [address] [facility]   · Address: [facility] [address] [place], [place] [address]Carlos Sweet  · Phone: [phone]  · Fax:    Dialysis Facility (if applicable)   · Name:  · Address:  · Dialysis Schedule:  · Phone:  · Fax:    / signature: Electronically signed by Nhi Sánchez RN on 7/2/21 at 8:58 AM EDT    PHYSICIAN SECTION    Prognosis: Good    Condition at Discharge: Stable    Rehab Potential (if transferring to Rehab): Good    Recommended Labs or Other Treatments After Discharge: PTOT, oral nutritional supplements    Physician Certification: I certify the above information and transfer of Iva Shepard  is necessary for the continuing treatment of the diagnosis listed and that she requires [place] for less 30 days. Update Admission H&P: Changes in H&P as follows - small bowel ischemia leading to exploratory laparotomy and 180cm of small bowel resection (has 140cm of jejunum remaining, and about 15cm of distal ileum remaining before the ileocecal valve).     PHYSICIAN SIGNATURE:  Electronically signed by rFank Leon MD on 7/2/21 at 2:11 PM EDT

## 2021-07-02 NOTE — PROGRESS NOTES
Internal Medicine Progress Note    ROD=[facility]    Kristina Ann. Omid Pate., LISA.[email or web address]. Yves Poole D.O., HASEEB Sofia D.O. Quinn Sands, MSN, APRN, NP-C  Mague Tapia. Yadiel Amador, MSN, APRN-CNP     Primary Care Physician: Marianna Tovar MD   Admitting Physician:  Sandy Santos MD  Admission date and time: 6/26/2021  6:05 PM    Room:  [facility]  Admitting diagnosis: Small bowel ischemia [facility]) [K55.9]    Patient Name: Brian Morel  MRN: [id number]    Date of Service: 7/2/2021     Subjective:  Mohsen Bello is a 80 y.o. female who was seen and examined today,7/2/2021, at the bedside. Mohsen Bello was transferred from the intensive care unit and is resting very comfortably today. All medications have been transitioned to oral.  Her pain is well controlled and she has become increasingly ambulatory. She is tolerating a diet. We are awaiting precertification for transfer to the nursing home facility. Review of System:   Constitutional:   Denies fever or chills, weight loss or gain, fatigue or malaise. HEENT:   Denies ear pain, sore throat, sinus or eye problems. Nasal cannula oxygen is in place. Cardiovascular:   No chest pain or palpitations. Respiratory:   No further shortness of breath. Gastrointestinal:   Postoperative abdominal pain is well controlled. She is tolerating a diet. Genitourinary:    Denies any urgency, frequency, hematuria. Voiding with the use of a Torres catheter. Extremities:   Denies lower extremity swelling, edema or cyanosis. Neurology:    Denies any headache or focal neurological deficits, Denies generalized weakness or memory difficulty. Psch:   Denies being anxious or depressed. Musculoskeletal:    Denies  myalgias, joint complaints or back pain. Integumentary:   Denies any rashes, ulcers, or excoriations. Denies bruising. Hematologic/Lymphatic:   Denies bruising or bleeding.     Physical Exam:  No intake/output data recorded. Intake/Output Summary (Last 24 hours) at 7/2/2021 1057  Last data filed at 7/1/2021 2158  Gross per 24 hour   Intake 490 ml   Output --   Net 490 ml   I/O last 3 completed shifts: In: 56 [P.O.:490]  Out: -   Patient Vitals for the past 96 hrs (Last 3 readings):   Weight   07/02/21 0353 148 lb 3.2 oz (67.2 kg)     Vital Signs:   Blood pressure 138/74, pulse 110, temperature 98.4 °F (36.9 °C), temperature source Oral, resp. rate 18, height 5' (1.524 m), weight 148 lb 3.2 oz (67.2 kg), SpO2 95 %. General appearance:  Awake and alert. Very polite and pleasant. Head:  Normocephalic. No masses, lesions or tenderness. Eyes:  PERRLA. EOMI. Sclera clear. Buccal mucosa moist.  ENT:  Ears normal. Mucosa normal.  Nasal cannula oxygen is in place. Neck:    Supple. Trachea midline. No thyromegaly. No JVD. No bruits. Heart:    Atrial fibrillation with intermittent rate control. Lungs:    Better aeration throughout. Abdomen:   Postoperative dressings are in place. Bowel sounds remain hypoactive. Pain to palpation diffusely with voluntary guarding elicited. Extremities:    Peripheral pulses present. No peripheral edema. No ulcers. No cyanosis. No clubbing. Neurologic:    Alert x 3. No focal deficit. Cranial nerves grossly intact. No focal weakness. Psych:   Behavior is normal. Mood appears normal. Speech is not rapid and/or pressured. Musculoskeletal:   Spine ROM normal. Muscular strength intact. Gait not assessed. Integumentary:  No rashes  Skin normal color and texture. Genitalia/Breast:  Voiding with use of a Torres catheter.     Medication:  Scheduled Meds:   magnesium sulfate  2,000 mg Intravenous Once    amiodarone  200 mg Oral BID    apixaban  5 mg Oral BID    cholestyramine  1 packet Oral BID    sodium chloride flush  10 mL Intravenous 2 times per day    sodium chloride (PF)  10 mL Intravenous Daily     Continuous Infusions:   sodium chloride         Objective Data:  CBC with Differential:    Lab Results   Component Value Date    WBC 8.9 07/02/2021    RBC 3.18 07/02/2021    HGB 9.9 07/02/2021    HCT 30.5 07/02/2021     07/02/2021    MCV 95.9 07/02/2021    MCH 31.1 07/02/2021    MCHC 32.5 07/02/2021    RDW 12.4 07/02/2021    LYMPHOPCT 10.6 07/02/2021    MONOPCT 11.1 07/02/2021    BASOPCT 0.2 07/02/2021    MONOSABS 0.99 07/02/2021    LYMPHSABS 0.94 07/02/2021    EOSABS 0.07 07/02/2021    BASOSABS 0.02 07/02/2021     BMP:    Lab Results   Component Value Date     07/02/2021    K 3.4 07/02/2021    K 3.8 06/28/2021    CL 99 07/02/2021    CO2 25 07/02/2021    BUN 5 07/02/2021    LABALBU 2.8 07/02/2021    CREATININE 0.4 07/02/2021    CALCIUM 8.2 07/02/2021    GFRAA >60 07/02/2021    LABGLOM >60 07/02/2021    GLUCOSE 108 07/02/2021       Assessment:  1. Severe sepsis secondary to ischemic small bowel from an internal hernia status post exploratory laparotomy  2. Postoperative respiratory failure with hypoxia status post extubation  3. New onset atrial fibrillation with rapid ventricular response    Plan:   Violeta Urrutia has been transferred from the intensive care unit and is resting comfortably on the telemetry floor. Her heart rate is better controlled with transition to oral amiodarone therapy. All other medications have been transitioned to oral and antibiotic therapy has been discontinued. Her respiratory status is stable. She is tolerating oral anticoagulation therapy. She is becoming increasingly active. She is tolerating a diet with adequate bowel movements. We are awaiting precertification for transfer to the nursing home facility. She is acceptable for discharge from an internal medicine standpoint. More than 50% of my  time was spent at the bedside counseling/coordinating care with the patient and/or family with face to face contact. This time was spent reviewing notes and laboratory data as well as instructing and counseling the patient.  Time I spent with the family or surrogate(s) is included only if the patient was incapable of providing the necessary information or participating in medical decisions. I also discussed the differential diagnosis and all of the proposed management plans with the patient and individuals accompanying the patient. Rodrigo Browne requires this high level of physician care and nursing in the ICU due to the complexity of decision management and chance of rapid decline or death. Continued cardiac monitoring and higher level of nursing are required. I am readily available for any further decision-making and intervention.      Vicky Mason DO, F.A.C.O.I.  7/2/2021  10:57 AM

## 2021-07-02 NOTE — CARE COORDINATION
Ss note:7/2/2021 9:56 AM Rapid covid test ordered today, result pending. Pt accepted at [address], [place] is submitting for [facility], will await insurance approval. Baljeet raymundo, SW completed a HENS, will need signed JORGE.  Ishan Mcpherson, PIPO

## 2021-07-02 NOTE — PROGRESS NOTES
[facility] to give nurse to nurse report. Ramila Mckinney states she will call back in a few minutes. Call back number given.

## 2021-07-02 NOTE — CARE COORDINATION
Ss note:7/2/2021.12:13 PM Rapid covid test ordered today, result pending. Pt accepted at [facility] [address] [facility] and sw notified that liaison needs an OT note to submit for PRECERT, sw paged therapy, once submitted will need to await insurance approval. Hens completed. DIL Kat aware of plan, requires PRECERT, negative covid and signed JORGE.  Rosalina Cheadle, LSW

## 2021-07-02 NOTE — PROGRESS NOTES
GENERAL SURGERY  DAILY PROGRESS NOTE    Date:2021       Room:[place] [address]  Patient iLli Sanchez     YOB: 1933     Age:87 y.o. Chief Complaint:  Chief Complaint   Patient presents with    Abdominal Pain     Sent here from  for Abdominal Pain, Back Pain and Bloating. Subjective:  Antibiotics/diflucan ended yesterday. Amio drip was changed to PO. Transfer order was placed to Telemetry floor. No n/v. Has been AMBULATING. Passed stool. Tolerating diet. No pain. Overall much better. Objective:  BP (!) 134/91   Pulse 117   Temp 97.9 °F (36.6 °C) (Oral)   Resp 22   Ht 5' (1.524 m)   Wt 148 lb 3.2 oz (67.2 kg)   SpO2 97%   BMI 28.94 kg/m²   Temp (24hrs), Av.8 °F (36.6 °C), Min:97.6 °F (36.4 °C), Max:97.9 °F (36.6 °C)      I/O (24Hr):  I/O last 3 completed shifts: In: 56 [P.O.:490]  Out: -      GENERAL:  No acute distress. Alert and interactive. LUNGS:  No cough. Nonlabored breathing on RA. CARDIOVASC:  Normal rate, no cyanosis. ABDOMEN:  Soft, non-distended, minimally-tender. Incision c/d/i. No guarding / rigidity / rebound. EXTREMITIES:  No edema, no deformities. Assessment:  80 y.o. female with small bowel ischemia s/p  exlap SBR. intermittent afib RVR    Plan:  - continue diet as tolerated  - continue questran for diarrhea  - replaced hypoK and Mg  - stable anemia, monitor daily  - appreciate cardiology recs, now on oral amio   - ok to continue transfer out of ICU, continue PTOT, plan for acute rehab    Electronically signed by Jadyn Sanabria MD on 2021 at 7:40 AM    As above. Miracle Petty for discharge when precert obtained.

## 2021-07-02 NOTE — CARE COORDINATION
7/2/21 1755 CM note: Patients son arrived at the hospital and requested wheelchair transport to [facility]. Arranged for [facility] to transport patient with  time of 6:30. Transport fee was $69.71 and paid by credit card by pts son. Ambulette form on soft chart. Provided patient with sweatpants, tshirt, underwear, bra, socks and shoes to get to facility as she did not have any clothes. NIKI Wiggins and charge nurse [employer] updated.  Electronically signed by Terry Amaro RN on 7/2/2021 at 5:58 PM

## 2021-07-05 NOTE — DISCHARGE SUMMARY
Physician Discharge Summary     Patient ID:  Ariadna Amaya  [id number]  38 y.o.  8/9/1933    Admit date: 6/26/2021    Discharge date and time: 7/5/2021    Admitting Physician: Luis Alberto Mejia MD     Admission Diagnoses:   Patient Active Problem List   Diagnosis    Small bowel ischemia [facility])       Discharge Diagnoses:   Patient Active Problem List   Diagnosis    Small bowel ischemia [facility])       Admission Condition: serious    Discharged Condition: good    Indication for Admission: Small bowel obstruction    Hospital Course: Ariadna Amaya is a 80 y.o. female who presented to the emergency room with abdominal pain for several days. CT abdomen pelvis revealed a small bowel obstruction with bowel wall thickening. She had worsened vital signs and was taken to the operating room for exploratory laparotomy with small bowel resection and lysis of adhesions. She was admitted to the ICU postop and recovered appropriately. Her diet was advanced. She was transferred out of the ICU and continued to improve. She did well with physical therapy and ambulated. She was seen by internal medicine and all her chronic medical conditions were stabilized. She was discharged to a skilled nursing facility. with appropriate medication, instructions and follow up. Consults: Hospitalist    Significant Diagnostic Studies: As above    Treatments: As above    In process/preliminary results:  Outstanding Order Results     No orders found from 5/28/2021 to 6/27/2021. Patient Instructions:   Discharge Medication List as of 7/2/2021  3:43 PM      START taking these medications    Details   apixaban (ELIQUIS) 5 MG TABS tablet Take 1 tablet by mouth 2 times daily, Disp-60 tabletDC to SNF      amiodarone (CORDARONE) 200 MG tablet Take 1 tablet by mouth 2 times dailyDC to SNF      traMADol (ULTRAM) 50 MG tablet Take 1 tablet by mouth every 6 hours as needed for Pain for up to 3 days. Intended supply: 3 days.  Take lowest dose possible

## 2021-07-21 ENCOUNTER — OFFICE VISIT (OUTPATIENT)
Dept: SURGERY | Age: 86
End: 2021-07-21

## 2021-07-21 VITALS
HEIGHT: 60 IN | WEIGHT: 148 LBS | TEMPERATURE: 97.2 F | SYSTOLIC BLOOD PRESSURE: 112 MMHG | DIASTOLIC BLOOD PRESSURE: 72 MMHG | HEART RATE: 94 BPM | BODY MASS INDEX: 29.06 KG/M2 | RESPIRATION RATE: 18 BRPM | OXYGEN SATURATION: 98 %

## 2021-07-21 DIAGNOSIS — K55.9 SMALL BOWEL ISCHEMIA (HCC): Primary | ICD-10-CM

## 2021-07-21 PROCEDURE — 99024 POSTOP FOLLOW-UP VISIT: CPT | Performed by: SURGERY

## 2021-07-21 NOTE — PROGRESS NOTES
General Surgery Office Note  Juan Carlos Lovett MD, MS    Patient's Name/Date of Birth: Jennifer Sarmiento / 8/9/1933    Date: July 21, 2021     Surgeon: Magda Schuler MD    Chief Complaint: s/p exlap and SBR for ischemic bowel from closed loop SBO from adhesions    Patient Active Problem List   Diagnosis    Small bowel ischemia [facility])       Subjective: Doing well, appetite is improving she is tolerating the cholestyramine. States she has 2-4 bowel movements per day. She is not having any abdominal pain. She is tolerating a diet but she has lost a few pounds since her discharge. Objective:  /72 (Site: Right Upper Arm, Position: Sitting, Cuff Size: Medium Adult)   Pulse 94   Temp 97.2 °F (36.2 °C)   Resp 18   Ht 5' (1.524 m)   Wt 148 lb (67.1 kg)   SpO2 98%   BMI 28.90 kg/m²   Labs:        General appearance: AA, NAD  HEENT: NCAT, PERRLA, EOMI  Lungs: Clear, equal rise bilateral  Heart: Reg  Abdomen: soft, nondistended, nontender, incisions well healed, no signs of infection, no cellulitis, no hematoma  Skin: No lesions, incisions well healed  Psych: No distress, conversive, no hallucinations  : No ulcers or lesions  Rectal: No bleeding    A complete 10 system review was performed and are otherwise negative unless mentioned in the above HPI. Specific negatives are listed below but may not include all those reviewed.     General ROS: negative obtundation, AMS  ENT ROS: negative rhinorrhea, epistaxis  Allergy and Immunology ROS: negative itchy/watery eyes or nasal congestion  Hematological and Lymphatic ROS: negative spontaneous bleeding or bruising  Endocrine ROS: negative  lethargy, mood swings, palpitations or polydipsia/polyuria  Respiratory ROS: negative sputum changes, stridor, tachypnea or wheezing  Cardiovascular ROS: negative for - loss of consciousness, murmur or orthopnea  Gastrointestinal ROS: negative for - hematochezia or hematemesis  Genito-Urinary ROS: negative for -  genital discharge or hematuria  Musculoskeletal ROS: negative for - focal weakness, gangrene  Psych/Neuro ROS: negative for - visual or auditory hallucinations, suicidal ideation      Time spent reviewing past medical, surgical, social and family history, vitals, nursing assessment and images. Pathology:   Diagnosis: 112 cm of small bowel  Segment of small intestine, excision: Diffuse ischemic enteritis. Submucosal, subserosal, serosal and laura-enteric soft tissue vascular   congestion, edema and hemorrhage. Single benign hemorrhagic laura-enteric lymph node present in a background   of soft tissue hemorrhage, negative for malignancy (0/1). Viable small intestinal tissue at margins of resection. Assessment/Plan:  Margareth Benitez is a 80 y.o. female 2 weeks status post exploratory laparotomy and small bowel resection. Remaining 140 cm of small bowel with intact ileocecal valve. Doing well    Continue cholestyramine for 3 months  Encouraged high calorie intake and monitoring of her bowel habits  Continue multivitamin  High risk for vitamin deficiency and bile salt depletion secondary to the length the bowel that was resected.   Recommend follow-up with her primary care doctor regarding onset of A. fib and Eliquis treatment  Follow-up with me as needed  Resume activity gradually with refraining from lifting more than 20 pounds for 4 weeks    Physician Signature: Electronically signed by Dr. Decker Locus  [phone] (p)  7/21/2021  11:46 AM

## 2021-08-12 ENCOUNTER — OFFICE VISIT (OUTPATIENT)
Dept: SURGERY | Age: 86
End: 2021-08-12

## 2021-08-12 VITALS
BODY MASS INDEX: 23.41 KG/M2 | WEIGHT: 124 LBS | HEART RATE: 92 BPM | DIASTOLIC BLOOD PRESSURE: 72 MMHG | HEIGHT: 61 IN | SYSTOLIC BLOOD PRESSURE: 121 MMHG | RESPIRATION RATE: 18 BRPM | OXYGEN SATURATION: 97 % | TEMPERATURE: 96.7 F

## 2021-08-12 DIAGNOSIS — K59.1 FUNCTIONAL DIARRHEA: Primary | ICD-10-CM

## 2021-08-12 DIAGNOSIS — K55.9 SMALL BOWEL ISCHEMIA (HCC): ICD-10-CM

## 2021-08-12 PROCEDURE — 99024 POSTOP FOLLOW-UP VISIT: CPT | Performed by: SURGERY

## 2021-08-17 ENCOUNTER — TELEPHONE (OUTPATIENT)
Dept: SURGERY | Age: 86
End: 2021-08-17

## 2021-10-30 LAB
ALBUMIN SERPL-MCNC: NORMAL G/DL
ALP BLD-CCNC: NORMAL U/L
ALT SERPL-CCNC: NORMAL U/L
ANION GAP SERPL CALCULATED.3IONS-SCNC: NORMAL MMOL/L
AST SERPL-CCNC: NORMAL U/L
BILIRUB SERPL-MCNC: NORMAL MG/DL
BUN BLDV-MCNC: NORMAL MG/DL
CALCIUM SERPL-MCNC: NORMAL MG/DL
CHLORIDE BLD-SCNC: NORMAL MMOL/L
CHOLESTEROL, TOTAL: NORMAL
CHOLESTEROL/HDL RATIO: NORMAL
CO2: NORMAL
CREAT SERPL-MCNC: NORMAL MG/DL
GFR CALCULATED: NORMAL
GLUCOSE BLD-MCNC: NORMAL MG/DL
HDLC SERPL-MCNC: NORMAL MG/DL
LDL CHOLESTEROL CALCULATED: NORMAL
MAGNESIUM: NORMAL
NONHDLC SERPL-MCNC: NORMAL MG/DL
POTASSIUM SERPL-SCNC: NORMAL MMOL/L
SODIUM BLD-SCNC: NORMAL MMOL/L
TOTAL PROTEIN: NORMAL
TRIGL SERPL-MCNC: NORMAL MG/DL
VITAMIN B-12: NORMAL
VITAMIN D 25-HYDROXY: NORMAL
VITAMIN D2, 25 HYDROXY: NORMAL
VITAMIN D3,25 HYDROXY: NORMAL
VLDLC SERPL CALC-MCNC: NORMAL MG/DL

## 2022-06-28 VITALS
TEMPERATURE: 97.4 F | HEIGHT: 64 IN | OXYGEN SATURATION: 96 % | BODY MASS INDEX: 20.32 KG/M2 | HEART RATE: 83 BPM | DIASTOLIC BLOOD PRESSURE: 64 MMHG | SYSTOLIC BLOOD PRESSURE: 112 MMHG | WEIGHT: 119 LBS

## 2022-09-07 ENCOUNTER — OFFICE VISIT (OUTPATIENT)
Dept: PRIMARY CARE CLINIC | Age: 87
End: 2022-09-07
Payer: MEDICARE

## 2022-09-07 VITALS
WEIGHT: 114 LBS | SYSTOLIC BLOOD PRESSURE: 150 MMHG | BODY MASS INDEX: 19.46 KG/M2 | OXYGEN SATURATION: 98 % | HEIGHT: 64 IN | DIASTOLIC BLOOD PRESSURE: 79 MMHG | TEMPERATURE: 97.2 F | HEART RATE: 83 BPM

## 2022-09-07 DIAGNOSIS — E78.5 HYPERLIPIDEMIA, UNSPECIFIED HYPERLIPIDEMIA TYPE: Primary | ICD-10-CM

## 2022-09-07 DIAGNOSIS — B02.9 HERPES ZOSTER WITHOUT COMPLICATION: ICD-10-CM

## 2022-09-07 PROCEDURE — 99213 OFFICE O/P EST LOW 20 MIN: CPT | Performed by: FAMILY MEDICINE

## 2022-09-07 PROCEDURE — 1123F ACP DISCUSS/DSCN MKR DOCD: CPT | Performed by: FAMILY MEDICINE

## 2022-09-07 SDOH — ECONOMIC STABILITY: TRANSPORTATION INSECURITY
IN THE PAST 12 MONTHS, HAS LACK OF TRANSPORTATION KEPT YOU FROM MEETINGS, WORK, OR FROM GETTING THINGS NEEDED FOR DAILY LIVING?: NO

## 2022-09-07 SDOH — ECONOMIC STABILITY: TRANSPORTATION INSECURITY
IN THE PAST 12 MONTHS, HAS THE LACK OF TRANSPORTATION KEPT YOU FROM MEDICAL APPOINTMENTS OR FROM GETTING MEDICATIONS?: NO

## 2022-09-07 SDOH — ECONOMIC STABILITY: FOOD INSECURITY: WITHIN THE PAST 12 MONTHS, THE FOOD YOU BOUGHT JUST DIDN'T LAST AND YOU DIDN'T HAVE MONEY TO GET MORE.: NEVER TRUE

## 2022-09-07 SDOH — ECONOMIC STABILITY: FOOD INSECURITY: WITHIN THE PAST 12 MONTHS, YOU WORRIED THAT YOUR FOOD WOULD RUN OUT BEFORE YOU GOT MONEY TO BUY MORE.: NEVER TRUE

## 2022-09-07 SDOH — ECONOMIC STABILITY: INCOME INSECURITY: IN THE LAST 12 MONTHS, WAS THERE A TIME WHEN YOU WERE NOT ABLE TO PAY THE MORTGAGE OR RENT ON TIME?: NO

## 2022-09-07 SDOH — ECONOMIC STABILITY: HOUSING INSECURITY
IN THE LAST 12 MONTHS, WAS THERE A TIME WHEN YOU DID NOT HAVE A STEADY PLACE TO SLEEP OR SLEPT IN A SHELTER (INCLUDING NOW)?: NO

## 2022-09-07 ASSESSMENT — PATIENT HEALTH QUESTIONNAIRE - PHQ9
SUM OF ALL RESPONSES TO PHQ QUESTIONS 1-9: 0
SUM OF ALL RESPONSES TO PHQ QUESTIONS 1-9: 0
2. FEELING DOWN, DEPRESSED OR HOPELESS: 0
SUM OF ALL RESPONSES TO PHQ QUESTIONS 1-9: 0
SUM OF ALL RESPONSES TO PHQ9 QUESTIONS 1 & 2: 0
SUM OF ALL RESPONSES TO PHQ QUESTIONS 1-9: 0
1. LITTLE INTEREST OR PLEASURE IN DOING THINGS: 0

## 2022-09-07 ASSESSMENT — SOCIAL DETERMINANTS OF HEALTH (SDOH): HOW HARD IS IT FOR YOU TO PAY FOR THE VERY BASICS LIKE FOOD, HOUSING, MEDICAL CARE, AND HEATING?: NOT HARD AT ALL

## 2022-09-07 NOTE — PROGRESS NOTES
Sally Trejo (:  1933) is a 80 y.o. female,Established patient, here for evaluation of the following chief complaint(s):  Rash and Herpes Zoster (Has itching and burning x 2 weeks, painful /Weight loss of 5 lbs)         ASSESSMENT/PLAN:  1. Hyperlipidemia, unspecified hyperlipidemia type  -     CBC with Auto Differential; Future  -     Comprehensive Metabolic Panel, Fasting; Future  -     Lipid Panel; Future  -     TSH; Future  -     Vitamin D 25 Hydroxy; Future  2. Herpes zoster without complication    Return in about 3 months (around 2022). Subjective   SUBJECTIVE/OBJECTIVE:  HPI   here for  evaluation  of  painful  rash     BP (!) 150/79   Pulse 83   Temp 97.2 °F (36.2 °C) (Temporal)   Ht 5' 4\" (1.626 m)   Wt 114 lb (51.7 kg)   SpO2 98%   BMI 19.57 kg/m²     Review of Systems   Skin:  Positive for rash. For  2  weeks  lesspainful  now         Lab Results   Component Value Date     2021    K 3.4 (L) 2021    CL 99 2021    CO2 25 2021    BUN 5 (L) 2021    CREATININE 0.4 (L) 2021    GLUCOSE 108 (H) 2021    CALCIUM 8.2 (L) 2021    PROT 5.5 (L) 2021    LABALBU 2.8 (L) 2021    BILITOT 0.3 2021    ALKPHOS 106 (H) 2021    AST 21 2021    ALT 20 2021    LABGLOM >60 2021    GFRAA >60 2021     No results found for: CHOL, TRIG, HDL, LDLCHOLESTEROL, LDLCALC, LABVLDL, VLDL, CHOLHDLRATIO  Lab Results   Component Value Date    WBC 8.9 2021    HGB 9.9 (L) 2021    HCT 30.5 (L) 2021    MCV 95.9 2021     2021     No results found for: TSHFT4, TSH, TSHREFLEX, MEB2EVJ  No results found for: VITD25      Objective   Physical Exam  Constitutional:       Appearance: Normal appearance. She is normal weight. Cardiovascular:      Rate and Rhythm: Normal rate and regular rhythm. Heart sounds: Normal heart sounds. No murmur heard.   Pulmonary:      Effort: Pulmonary effort is normal.      Breath sounds: Normal breath sounds. Abdominal:      Tenderness: There is no abdominal tenderness. Skin:     Findings: Lesion and rash present. Comments: Painful  blistered  rash  thoracic dermatome  left  upper body    Neurological:      Mental Status: She is alert. An electronic signature was used to authenticate this note.     --Gideon Kamara MD

## 2022-10-08 LAB
ABSOLUTE BASO #: 0.05 K/UL (ref 0–0.2)
ABSOLUTE EOS #: 0.14 K/UL (ref 0–0.5)
ABSOLUTE LYMPH #: 1.37 K/UL (ref 1–4)
ABSOLUTE MONO #: 0.55 K/UL (ref 0.2–1)
ABSOLUTE NEUT #: 4.6 K/UL (ref 1.5–7.5)
ALBUMIN SERPL-MCNC: 4.6 G/DL (ref 3.5–5.2)
ALK PHOSPHATASE: 117 U/L (ref 40–142)
ALT SERPL-CCNC: 19 U/L (ref 5–40)
ANION GAP SERPL CALCULATED.3IONS-SCNC: 9 MEQ/L (ref 7–16)
AST SERPL-CCNC: 21 U/L (ref 9–40)
BASOPHILS RELATIVE PERCENT: 0.7 %
BILIRUB SERPL-MCNC: 0.6 MG/DL
BUN BLDV-MCNC: 15 MG/DL (ref 8–23)
CALCIUM SERPL-MCNC: 9.5 MG/DL (ref 8.5–10.5)
CHLORIDE BLD-SCNC: 103 MEQ/L (ref 95–107)
CHOLESTEROL/HDL RATIO: 2.1 RATIO
CHOLESTEROL: 177 MG/DL
CO2: 25 MEQ/L (ref 19–31)
CREAT SERPL-MCNC: 0.48 MG/DL (ref 0.6–1.3)
EGFR IF NONAFRICAN AMERICAN: 90 ML/MIN/1.73
EOSINOPHILS RELATIVE PERCENT: 2.1 %
GLUCOSE: 85 MG/DL (ref 70–99)
HCT VFR BLD CALC: 36.4 % (ref 34–45)
HDLC SERPL-MCNC: 85 MG/DL
HEMOGLOBIN: 12.2 G/DL (ref 11.5–15.5)
LDL CHOLESTEROL CALCULATED: 74 MG/DL
LDL/HDL RATIO: 0.9 RATIO
LYMPHOCYTE %: 20.4 %
MCH RBC QN AUTO: 31.2 PG (ref 25–33)
MCHC RBC AUTO-ENTMCNC: 33.5 G/DL (ref 31–36)
MCV RBC AUTO: 93.1 FL (ref 80–99)
MONOCYTES # BLD: 8.2 %
NEUTROPHILS RELATIVE PERCENT: 68.3 %
PDW BLD-RTO: 11.7 % (ref 11.5–15)
PLATELETS: 203 K/UL (ref 130–400)
PMV BLD AUTO: 11.2 FL (ref 9.3–13)
POTASSIUM SERPL-SCNC: 4.2 MEQ/L (ref 3.5–5.4)
RBC: 3.91 M/UL (ref 3.8–5.4)
SODIUM BLD-SCNC: 137 MEQ/L (ref 133–146)
TOTAL PROTEIN: 6.8 G/DL (ref 6.1–8.3)
TRIGL SERPL-MCNC: 89 MG/DL
TSH SERPL DL<=0.05 MIU/L-ACNC: 2.68 UIU/ML (ref 0.4–4.1)
VITAMIN D 25-HYDROXY: 32 NG/ML
VLDLC SERPL CALC-MCNC: 18 MG/DL
WBC: 6.7 K/UL (ref 3.5–11)

## 2022-11-10 ENCOUNTER — OFFICE VISIT (OUTPATIENT)
Dept: PRIMARY CARE CLINIC | Age: 87
End: 2022-11-10
Payer: MEDICARE

## 2022-11-10 VITALS
HEIGHT: 65 IN | OXYGEN SATURATION: 97 % | WEIGHT: 115 LBS | HEART RATE: 87 BPM | BODY MASS INDEX: 19.16 KG/M2 | DIASTOLIC BLOOD PRESSURE: 76 MMHG | TEMPERATURE: 97.8 F | SYSTOLIC BLOOD PRESSURE: 145 MMHG

## 2022-11-10 DIAGNOSIS — Z00.00 INITIAL MEDICARE ANNUAL WELLNESS VISIT: Primary | ICD-10-CM

## 2022-11-10 PROCEDURE — 1123F ACP DISCUSS/DSCN MKR DOCD: CPT | Performed by: FAMILY MEDICINE

## 2022-11-10 PROCEDURE — G0438 PPPS, INITIAL VISIT: HCPCS | Performed by: FAMILY MEDICINE

## 2022-11-10 ASSESSMENT — PATIENT HEALTH QUESTIONNAIRE - PHQ9
2. FEELING DOWN, DEPRESSED OR HOPELESS: 0
SUM OF ALL RESPONSES TO PHQ9 QUESTIONS 1 & 2: 0
SUM OF ALL RESPONSES TO PHQ QUESTIONS 1-9: 0
1. LITTLE INTEREST OR PLEASURE IN DOING THINGS: 0

## 2022-11-10 ASSESSMENT — LIFESTYLE VARIABLES
HOW OFTEN DO YOU HAVE A DRINK CONTAINING ALCOHOL: NEVER
HOW MANY STANDARD DRINKS CONTAINING ALCOHOL DO YOU HAVE ON A TYPICAL DAY: PATIENT DOES NOT DRINK

## 2022-11-10 ASSESSMENT — ENCOUNTER SYMPTOMS: ABDOMINAL PAIN: 0

## 2022-11-10 NOTE — PROGRESS NOTES
Kassandra Green (:  1933) is a 80 y.o. female,Established patient, here for evaluation of the following chief complaint(s):  Medicare AWV (Follow up for lab results and medicare wellness exam)         ASSESSMENT/PLAN:  1. Initial Medicare annual wellness visit    Return for Medicare Annual Wellness Visit in 1 year. Subjective   SUBJECTIVE/OBJECTIVE:        BP (!) 145/76   Pulse 87   Temp 97.8 °F (36.6 °C) (Temporal)   Ht 5' 4.8\" (1.646 m)   Wt 115 lb (52.2 kg)   SpO2 97%   BMI 19.26 kg/m²     Review of Systems   Constitutional:  Negative for activity change, fatigue and fever. Cardiovascular:  Negative for chest pain and leg swelling. Gastrointestinal:  Negative for abdominal pain. Lab Results   Component Value Date     10/07/2022    K 4.2 10/07/2022     10/07/2022    CO2 25 10/07/2022    BUN 15 10/07/2022    CREATININE 0.48 (L) 10/07/2022    GLUCOSE 85 10/07/2022    CALCIUM 9.5 10/07/2022    PROT 6.8 10/07/2022    LABALBU 4.6 10/07/2022    BILITOT 0.6 10/07/2022    ALKPHOS 117 10/07/2022    AST 21 10/07/2022    ALT 19 10/07/2022    LABGLOM >60 2021    GFRAA >60 2021     Lab Results   Component Value Date    CHOL 177 10/07/2022    TRIG 89 10/07/2022    HDL 85 10/07/2022    LDLCALC 74 10/07/2022    LABVLDL 18 10/07/2022    CHOLHDLRATIO 2.1 10/07/2022     Lab Results   Component Value Date    WBC 6.7 10/07/2022    HGB 12.2 10/07/2022    HCT 36.4 10/07/2022    MCV 93.1 10/07/2022     10/07/2022     Lab Results   Component Value Date    TSH 2.680 10/07/2022     Lab Results   Component Value Date/Time    VITD25 32 10/07/2022 09:53 AM         Objective   Physical Exam  Constitutional:       General: She is not in acute distress. Appearance: Normal appearance. She is not ill-appearing. HENT:      Head: Normocephalic.       Right Ear: Tympanic membrane normal.      Left Ear: Tympanic membrane normal.      Mouth/Throat:      Mouth: Mucous membranes are moist. Pharynx: Oropharynx is clear. Eyes:      Extraocular Movements: Extraocular movements intact. Pupils: Pupils are equal, round, and reactive to light. Cardiovascular:      Rate and Rhythm: Normal rate and regular rhythm. Heart sounds: Normal heart sounds. No murmur heard. Pulmonary:      Effort: Pulmonary effort is normal.   Abdominal:      General: Abdomen is flat. Bowel sounds are normal.      Palpations: Abdomen is soft. There is no mass. Tenderness: There is no abdominal tenderness. Musculoskeletal:      Cervical back: Normal range of motion and neck supple. Neurological:      Mental Status: She is alert and oriented to person, place, and time. Psychiatric:         Mood and Affect: Mood normal.         Behavior: Behavior normal.         Thought Content: Thought content normal.         Judgment: Judgment normal.                    An electronic signature was used to authenticate this note. --Windy Crouch MD   Medicare Annual Wellness Visit    Bhaskar Mina is here for Medicare AWV (Follow up for lab results and medicare wellness exam)    Assessment & Plan   Initial Medicare annual wellness visit    Recommendations for Preventive Services Due: see orders and patient instructions/AVS.  Recommended screening schedule for the next 5-10 years is provided to the patient in written form: see Patient Instructions/AVS.     Return for Medicare Annual Wellness Visit in 1 year. Subjective       Patient's complete Health Risk Assessment and screening values have been reviewed and are found in Flowsheets. The following problems were reviewed today and where indicated follow up appointments were made and/or referrals ordered. Positive Risk Factor Screenings with Interventions:                  No Positive Risk Factors identified today.           Objective   Vitals:    11/10/22 1051 11/10/22 1052   BP: (!) 153/71 (!) 145/76   Site: Left Upper Arm    Position: Sitting    Cuff Size: Small Adult    Pulse: 87    Temp: 97.8 °F (36.6 °C)    TempSrc: Temporal    SpO2: 97%    Weight: 115 lb (52.2 kg)    Height: 5' 4.8\" (1.646 m)       Body mass index is 19.26 kg/m². Allergies   Allergen Reactions    Nut [Peanut-Containing Drug Products] Swelling     WALNUTS/ PECANS/ TREE NUTS    Amoxicillin     Statins      Prior to Visit Medications    Medication Sig Taking?  Authorizing Provider   aspirin 81 MG chewable tablet Take 81 mg by mouth daily  Historical Provider, MD   Multiple Vitamins-Minerals (PRESERVISION AREDS 2 PO) Take by mouth  Historical Provider, MD   calcium carbonate (OSCAL) 500 MG TABS tablet Take 500 mg by mouth daily  Historical Provider, MD       CareTeam (Including outside providers/suppliers regularly involved in providing care):   Patient Care Team:  Nicholas Lynch MD as PCP - General (Family Medicine)  Nicholas Lynch MD as PCP - [facility] Empaneled Provider     Reviewed and updated this visit:  Tobacco  Allergies  Meds  Problems  Med Hx  Surg Hx  Soc Hx  Fam Hx

## 2022-11-10 NOTE — PATIENT INSTRUCTIONS
Personalized Preventive Plan for Shereen Garrido - 11/10/2022  Medicare offers a range of preventive health benefits. Some of the tests and screenings are paid in full while other may be subject to a deductible, co-insurance, and/or copay. Some of these benefits include a comprehensive review of your medical history including lifestyle, illnesses that may run in your family, and various assessments and screenings as appropriate. After reviewing your medical record and screening and assessments performed today your provider may have ordered immunizations, labs, imaging, and/or referrals for you. A list of these orders (if applicable) as well as your Preventive Care list are included within your After Visit Summary for your review. Other Preventive Recommendations:    A preventive eye exam performed by an eye specialist is recommended every 1-2 years to screen for glaucoma; cataracts, macular degeneration, and other eye disorders. A preventive dental visit is recommended every 6 months. Try to get at least 150 minutes of exercise per week or 10,000 steps per day on a pedometer . Order or download the FREE \"Exercise & Physical Activity: Your Everyday Guide\" from [employer]. Call 5-907.448.9119 or search [employer] online. You need 2921-5302 mg of calcium and 5063-3565 IU of vitamin D per day. It is possible to meet your calcium requirement with diet alone, but a vitamin D supplement is usually necessary to meet this goal.  When exposed to the sun, use a sunscreen that protects against both UVA and UVB radiation with an SPF of 30 or greater. Reapply every 2 to 3 hours or after sweating, drying off with a towel, or swimming. Always wear a seat belt when traveling in a car. Always wear a helmet when riding a bicycle or motorcycle.

## 2022-12-16 ENCOUNTER — TELEPHONE (OUTPATIENT)
Dept: PRIMARY CARE CLINIC | Age: 87
End: 2022-12-16

## 2022-12-16 DIAGNOSIS — U07.1 COVID-19: Primary | ICD-10-CM

## 2022-12-16 RX ORDER — METHYLPREDNISOLONE 4 MG/1
TABLET ORAL
Qty: 1 KIT | Refills: 0 | Status: SHIPPED | OUTPATIENT
Start: 2022-12-16 | End: 2022-12-22

## 2022-12-16 RX ORDER — NIRMATRELVIR AND RITONAVIR 300-100 MG
KIT ORAL
Qty: 30 TABLET | Refills: 0 | Status: SHIPPED | OUTPATIENT
Start: 2022-12-16 | End: 2022-12-21

## 2022-12-16 NOTE — TELEPHONE ENCOUNTER
Patient took a home test for covid and it came back positive, she is having headaches, sinusitis, diarrhea, coughing and congestion, no energy and is not able to drive anywhere. Is there anything that can be given to her prescription that can be sent in for her? Dr. Dwayne luther she is interested in the Paxlovid and said she will also quarantine for 5 days and will go to er if she has trouble breathing. She also said to say thank you.

## 2023-02-06 LAB
ALBUMIN SERPL-MCNC: NORMAL G/DL
ALP BLD-CCNC: NORMAL U/L
ALT SERPL-CCNC: NORMAL U/L
ANION GAP SERPL CALCULATED.3IONS-SCNC: NORMAL MMOL/L
AST SERPL-CCNC: NORMAL U/L
BILIRUB SERPL-MCNC: NORMAL MG/DL
BUN BLDV-MCNC: NORMAL MG/DL
CALCIUM SERPL-MCNC: NORMAL MG/DL
CHLORIDE BLD-SCNC: NORMAL MMOL/L
CO2: NORMAL
CREAT SERPL-MCNC: NORMAL MG/DL
EGFR: NORMAL
GLUCOSE BLD-MCNC: NORMAL MG/DL
POC INR: NORMAL
POTASSIUM SERPL-SCNC: NORMAL MMOL/L
PROTHROMBIN TIME, POC: NORMAL
SODIUM BLD-SCNC: NORMAL MMOL/L
TOTAL PROTEIN: NORMAL

## 2023-03-06 LAB

## 2024-06-09 ENCOUNTER — APPOINTMENT (OUTPATIENT)
Dept: GENERAL RADIOLOGY | Age: 89
End: 2024-06-09
Payer: MEDICARE

## 2024-06-09 ENCOUNTER — APPOINTMENT (OUTPATIENT)
Dept: CT IMAGING | Age: 89
End: 2024-06-09
Payer: MEDICARE

## 2024-06-09 ENCOUNTER — HOSPITAL ENCOUNTER (EMERGENCY)
Age: 89
Discharge: HOME | End: 2024-06-09
Attending: STUDENT IN AN ORGANIZED HEALTH CARE EDUCATION/TRAINING PROGRAM | Admitting: STUDENT IN AN ORGANIZED HEALTH CARE EDUCATION/TRAINING PROGRAM
Payer: MEDICARE

## 2024-06-09 VITALS
BODY MASS INDEX: 18.42 KG/M2 | HEART RATE: 95 BPM | TEMPERATURE: 97.4 F | OXYGEN SATURATION: 98 % | WEIGHT: 110 LBS | DIASTOLIC BLOOD PRESSURE: 66 MMHG | SYSTOLIC BLOOD PRESSURE: 143 MMHG | RESPIRATION RATE: 18 BRPM

## 2024-06-09 DIAGNOSIS — S81.812A LACERATION OF LEFT LOWER EXTREMITY, INITIAL ENCOUNTER: ICD-10-CM
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
## 2024-06-09 PROCEDURE — 70450 CT HEAD/BRAIN W/O DYE: CPT

## 2024-06-09 PROCEDURE — 6370000000 HC RX 637 (ALT 250 FOR IP): Performed by: STUDENT IN AN ORGANIZED HEALTH CARE EDUCATION/TRAINING PROGRAM

## 2024-06-09 PROCEDURE — 72170 X-RAY EXAM OF PELVIS: CPT

## 2024-06-09 PROCEDURE — 73590 X-RAY EXAM OF LOWER LEG: CPT

## 2024-06-09 PROCEDURE — 72125 CT NECK SPINE W/O DYE: CPT

## 2024-06-09 PROCEDURE — 71045 X-RAY EXAM CHEST 1 VIEW: CPT

## 2024-06-09 PROCEDURE — 99284 EMERGENCY DEPT VISIT MOD MDM: CPT

## 2024-06-09 RX ORDER — CEPHALEXIN 500 MG/1
500 CAPSULE ORAL 3 TIMES DAILY
Qty: 21 CAPSULE | Refills: 0 | Status: SHIPPED | OUTPATIENT
Start: 2024-06-09 | End: 2024-06-16

## 2024-06-09 RX ORDER — GINSENG 100 MG
CAPSULE ORAL
Qty: 14 G | Refills: 0 | Status: SHIPPED | OUTPATIENT
Start: 2024-06-09 | End: 2024-06-19

## 2024-06-09 RX ORDER — CEPHALEXIN 250 MG/1
500 CAPSULE ORAL ONCE
Status: COMPLETED | OUTPATIENT
Start: 2024-06-09 | End: 2024-06-09

## 2024-06-09 RX ORDER — BACITRACIN ZINC 500 [USP'U]/G
OINTMENT TOPICAL ONCE
Status: COMPLETED | OUTPATIENT
Start: 2024-06-09 | End: 2024-06-09

## 2024-06-09 RX ADMIN — CEPHALEXIN 500 MG: 250 CAPSULE ORAL at 19:26

## 2024-06-09 RX ADMIN — BACITRACIN ZINC: 500 OINTMENT TOPICAL at 16:04

## 2024-06-09 ASSESSMENT — LIFESTYLE VARIABLES: HOW OFTEN DO YOU HAVE A DRINK CONTAINING ALCOHOL: NEVER

## 2024-06-09 NOTE — DISCHARGE INSTRUCTIONS
Take all antibiotics as prescribed  Keep wound clean and dry  Follow-up with primary care doctor  If you notice any new worrisome symptoms please return to emergency department for evaluation

## 2024-06-09 NOTE — ED PROVIDER NOTES
[facility] EMERGENCY DEPARTMENT  EMERGENCY DEPARTMENT ENCOUNTER      Pt Name: Soraya Hammond  MRN: [id number]  Birthdate 8/9/1933  Date of evaluation: 6/9/2024  Provider: Juan Hicks DO  PCP: Damian Jenkins MD  Note Started: 3:31 PM EDT 6/9/24    CHIEF COMPLAINT       Chief Complaint   Patient presents with    Fall     Slip and fall in bathroom this am, c/o left lower leg pain, hit head but states no pain, no neck pain       HISTORY OF PRESENT ILLNESS: 1 or more Elements   History From: Patient  Limitations to history : None    Soraya Hammond is a 89 y.o. female past medical history of hyperlipidemia and vertigo.  Patient presents with chief complaint of fall with left leg pain.  Patient states that she was getting up to go to the shower when she slipped and fell and hit her left leg.  Patient notes pain to her left leg as well as a small laceration.  She is on Eliquis bleeding has been controlled.  Patient states that she may have struck her head she denies any loss of consciousness.  Patient denies any chest pain dizziness lightheadedness prior to or since the fall.  Patient denies any fevers, chills, chest pain, cough, Ronnie pain, constipation or diarrhea.    Nursing Notes were all reviewed and agreed with or any disagreements were addressed in the HPI.    REVIEW OF SYSTEMS :    Positives and Pertinent negatives as per HPI.     PAST MEDICAL HISTORY/Chronic Conditions Affecting Care    has a past medical history of Carpal tunnel syndrome, Cataract, Cobalamin deficiency, Contact dermatitis, Contusion, Hematoma, Hyperlipidemia, Hypomagnesemia, Perforation of intestine (HCC), Short bowel syndrome, Shoulder pain, Upper respiratory infection, Urinary tract infectious disease, Vertigo, and Vitamin D deficiency.     SURGICAL HISTORY     Past Surgical History:   Procedure Laterality Date    CHOLECYSTECTOMY      COLECTOMY N/A 6/27/2021    EXPLORATORY LAPAROTOMY, SMALL BOWEL RESECTION,

## 2024-09-21 ENCOUNTER — APPOINTMENT (OUTPATIENT)
Dept: CT IMAGING | Age: 89
End: 2024-09-21
Payer: MEDICARE

## 2024-09-21 ENCOUNTER — HOSPITAL ENCOUNTER (INPATIENT)
Age: 89
LOS: 2 days | Discharge: HOME | DRG: 083 | End: 2024-09-24
Attending: EMERGENCY MEDICINE | Admitting: SURGERY
Payer: MEDICARE

## 2024-09-21 ENCOUNTER — APPOINTMENT (OUTPATIENT)
Dept: GENERAL RADIOLOGY | Age: 89
End: 2024-09-21
Payer: MEDICARE

## 2024-09-21 ENCOUNTER — HOSPITAL ENCOUNTER (EMERGENCY)
Age: 89
Discharge: ANOTHER HOSPITAL | End: 2024-09-21
Attending: EMERGENCY MEDICINE
Payer: MEDICARE

## 2024-09-21 VITALS
TEMPERATURE: 97.4 F | DIASTOLIC BLOOD PRESSURE: 87 MMHG | HEART RATE: 92 BPM | OXYGEN SATURATION: 99 % | SYSTOLIC BLOOD PRESSURE: 128 MMHG | RESPIRATION RATE: 16 BRPM

## 2024-09-21 DIAGNOSIS — S02.92XA CLOSED FRACTURE OF FACIAL BONE, UNSPECIFIED FACIAL BONE, INITIAL ENCOUNTER: ICD-10-CM

## 2024-09-21 DIAGNOSIS — S06.5XAA SUBDURAL HEMATOMA: ICD-10-CM

## 2024-09-21 DIAGNOSIS — S09.90XA INJURY OF HEAD, INITIAL ENCOUNTER: ICD-10-CM
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
## 2024-09-21 DIAGNOSIS — I48.91 ATRIAL FIBRILLATION WITH RAPID VENTRICULAR RESPONSE (HCC): ICD-10-CM

## 2024-09-21 DIAGNOSIS — S06.5XAA SUBDURAL HEMATOMA: Primary | ICD-10-CM

## 2024-09-21 DIAGNOSIS — S09.90XA INJURY OF HEAD, INITIAL ENCOUNTER: Primary | ICD-10-CM

## 2024-09-21 LAB
ALBUMIN SERPL-MCNC: 4.4 G/DL (ref 3.5–5.2)
ALP SERPL-CCNC: 111 U/L (ref 35–104)
ALT SERPL-CCNC: 16 U/L (ref 0–32)
ANION GAP SERPL CALCULATED.3IONS-SCNC: 9 MMOL/L (ref 7–16)
AST SERPL-CCNC: 23 U/L (ref 0–31)
BACTERIA URNS QL MICRO: ABNORMAL
BASOPHILS # BLD: 0.03 K/UL (ref 0–0.2)
BASOPHILS NFR BLD: 1 % (ref 0–2)
BILIRUB SERPL-MCNC: 0.5 MG/DL (ref 0–1.2)
BILIRUB UR QL STRIP: NEGATIVE
BNP SERPL-MCNC: 1362 PG/ML (ref 0–450)
BUN SERPL-MCNC: 18 MG/DL (ref 6–23)
CALCIUM SERPL-MCNC: 9.5 MG/DL (ref 8.6–10.2)
CHLORIDE SERPL-SCNC: 100 MMOL/L (ref 98–107)
CLARITY UR: CLEAR
CO2 SERPL-SCNC: 28 MMOL/L (ref 22–29)
COLOR UR: YELLOW
CREAT SERPL-MCNC: 0.6 MG/DL (ref 0.5–1)
EOSINOPHIL # BLD: 0.05 K/UL (ref 0.05–0.5)
EOSINOPHILS RELATIVE PERCENT: 1 % (ref 0–6)
EPI CELLS #/AREA URNS HPF: ABNORMAL /HPF
ERYTHROCYTE [DISTWIDTH] IN BLOOD BY AUTOMATED COUNT: 12.7 % (ref 11.5–15)
GFR, ESTIMATED: 85 ML/MIN/1.73M2
GLUCOSE SERPL-MCNC: 113 MG/DL (ref 74–99)
GLUCOSE UR STRIP-MCNC: NEGATIVE MG/DL
HCT VFR BLD AUTO: 37.4 % (ref 34–48)
HGB BLD-MCNC: 12.6 G/DL (ref 11.5–15.5)
HGB UR QL STRIP.AUTO: ABNORMAL
IMM GRANULOCYTES # BLD AUTO: <0.03 K/UL (ref 0–0.58)
IMM GRANULOCYTES NFR BLD: 0 % (ref 0–5)
INR PPP: 1.1
KETONES UR STRIP-MCNC: NEGATIVE MG/DL
LEUKOCYTE ESTERASE UR QL STRIP: ABNORMAL
LYMPHOCYTES NFR BLD: 0.92 K/UL (ref 1.5–4)
LYMPHOCYTES RELATIVE PERCENT: 17 % (ref 20–42)
MCH RBC QN AUTO: 31.8 PG (ref 26–35)
MCHC RBC AUTO-ENTMCNC: 33.7 G/DL (ref 32–34.5)
MCV RBC AUTO: 94.4 FL (ref 80–99.9)
MONOCYTES NFR BLD: 0.52 K/UL (ref 0.1–0.95)
MONOCYTES NFR BLD: 9 % (ref 2–12)
NEUTROPHILS NFR BLD: 72 % (ref 43–80)
NEUTS SEG NFR BLD: 3.99 K/UL (ref 1.8–7.3)
NITRITE UR QL STRIP: NEGATIVE
PH UR STRIP: 6 [PH] (ref 5–9)
PLATELET # BLD AUTO: 208 K/UL (ref 130–450)
PMV BLD AUTO: 9.7 FL (ref 7–12)
POTASSIUM SERPL-SCNC: 4.5 MMOL/L (ref 3.5–5)
PROT SERPL-MCNC: 6.8 G/DL (ref 6.4–8.3)
PROT UR STRIP-MCNC: NEGATIVE MG/DL
PROTHROMBIN TIME: 11.8 SEC (ref 9.3–12.4)
RBC # BLD AUTO: 3.96 M/UL (ref 3.5–5.5)
RBC #/AREA URNS HPF: ABNORMAL /HPF
SODIUM SERPL-SCNC: 137 MMOL/L (ref 132–146)
SP GR UR STRIP: 1.01 (ref 1–1.03)
TROPONIN I SERPL HS-MCNC: 17 NG/L (ref 0–9)
TROPONIN I SERPL HS-MCNC: 19 NG/L (ref 0–9)
UROBILINOGEN UR STRIP-ACNC: 0.2 EU/DL (ref 0–1)
WBC #/AREA URNS HPF: ABNORMAL /HPF
WBC OTHER # BLD: 5.5 K/UL (ref 4.5–11.5)

## 2024-09-21 PROCEDURE — 2580000003 HC RX 258

## 2024-09-21 PROCEDURE — 6360000002 HC RX W HCPCS

## 2024-09-21 PROCEDURE — 99285 EMERGENCY DEPT VISIT HI MDM: CPT

## 2024-09-21 PROCEDURE — 72125 CT NECK SPINE W/O DYE: CPT

## 2024-09-21 PROCEDURE — 87086 URINE CULTURE/COLONY COUNT: CPT

## 2024-09-21 PROCEDURE — 80053 COMPREHEN METABOLIC PANEL: CPT

## 2024-09-21 PROCEDURE — 96365 THER/PROPH/DIAG IV INF INIT: CPT

## 2024-09-21 PROCEDURE — 85025 COMPLETE CBC W/AUTO DIFF WBC: CPT

## 2024-09-21 PROCEDURE — 85610 PROTHROMBIN TIME: CPT

## 2024-09-21 PROCEDURE — 96375 TX/PRO/DX INJ NEW DRUG ADDON: CPT

## 2024-09-21 PROCEDURE — 96366 THER/PROPH/DIAG IV INF ADDON: CPT

## 2024-09-21 PROCEDURE — 70486 CT MAXILLOFACIAL W/O DYE: CPT

## 2024-09-21 PROCEDURE — 83880 ASSAY OF NATRIURETIC PEPTIDE: CPT

## 2024-09-21 PROCEDURE — 70450 CT HEAD/BRAIN W/O DYE: CPT

## 2024-09-21 PROCEDURE — 84484 ASSAY OF TROPONIN QUANT: CPT

## 2024-09-21 PROCEDURE — 93005 ELECTROCARDIOGRAM TRACING: CPT

## 2024-09-21 PROCEDURE — 71046 X-RAY EXAM CHEST 2 VIEWS: CPT

## 2024-09-21 PROCEDURE — 6360000002 HC RX W HCPCS: Performed by: EMERGENCY MEDICINE

## 2024-09-21 PROCEDURE — 81001 URINALYSIS AUTO W/SCOPE: CPT

## 2024-09-21 PROCEDURE — 2500000003 HC RX 250 WO HCPCS

## 2024-09-21 RX ORDER — DILTIAZEM HYDROCHLORIDE 5 MG/ML
10 INJECTION INTRAVENOUS ONCE
Status: COMPLETED | OUTPATIENT
Start: 2024-09-21 | End: 2024-09-21

## 2024-09-21 RX ORDER — SODIUM CHLORIDE 9 MG/ML
INJECTION, SOLUTION INTRAVENOUS
Status: COMPLETED
Start: 2024-09-21 | End: 2024-09-21

## 2024-09-21 RX ORDER — SODIUM CHLORIDE 9 MG/ML
50 INJECTION, SOLUTION INTRAVENOUS ONCE
Status: COMPLETED | OUTPATIENT
Start: 2024-09-21 | End: 2024-09-21

## 2024-09-21 RX ADMIN — SODIUM CHLORIDE 50 ML: 9 INJECTION, SOLUTION INTRAVENOUS at 19:35

## 2024-09-21 RX ADMIN — WATER 1000 MG: 1 INJECTION INTRAMUSCULAR; INTRAVENOUS; SUBCUTANEOUS at 18:47

## 2024-09-21 RX ADMIN — PROTHROMBIN COMPLEX CONCENTRATE (HUMAN) 2000 UNITS: 25.5; 16.5; 24; 22; 22; 26 POWDER, FOR SOLUTION INTRAVENOUS at 19:32

## 2024-09-21 RX ADMIN — DILTIAZEM HYDROCHLORIDE 10 MG: 5 INJECTION, SOLUTION INTRAVENOUS at 22:37

## 2024-09-21 RX ADMIN — SODIUM CHLORIDE 2.5 MG/HR: 900 INJECTION, SOLUTION INTRAVENOUS at 22:48

## 2024-09-21 ASSESSMENT — PAIN DESCRIPTION - ORIENTATION
ORIENTATION: RIGHT
ORIENTATION: RIGHT;LEFT

## 2024-09-21 ASSESSMENT — PAIN - FUNCTIONAL ASSESSMENT
PAIN_FUNCTIONAL_ASSESSMENT: ACTIVITIES ARE NOT PREVENTED
PAIN_FUNCTIONAL_ASSESSMENT: 0-10
PAIN_FUNCTIONAL_ASSESSMENT: 0-10

## 2024-09-21 ASSESSMENT — PAIN DESCRIPTION - LOCATION
LOCATION: FACE
LOCATION: HEAD

## 2024-09-21 ASSESSMENT — PAIN SCALES - GENERAL
PAINLEVEL_OUTOF10: 3
PAINLEVEL_OUTOF10: 9

## 2024-09-21 ASSESSMENT — PAIN DESCRIPTION - PAIN TYPE: TYPE: ACUTE PAIN

## 2024-09-21 ASSESSMENT — LIFESTYLE VARIABLES
HOW MANY STANDARD DRINKS CONTAINING ALCOHOL DO YOU HAVE ON A TYPICAL DAY: PATIENT DOES NOT DRINK
HOW OFTEN DO YOU HAVE A DRINK CONTAINING ALCOHOL: NEVER

## 2024-09-21 ASSESSMENT — PAIN DESCRIPTION - ONSET: ONSET: SUDDEN

## 2024-09-21 ASSESSMENT — PAIN DESCRIPTION - DESCRIPTORS: DESCRIPTORS: ACHING;DULL

## 2024-09-21 ASSESSMENT — PAIN DESCRIPTION - FREQUENCY: FREQUENCY: CONTINUOUS

## 2024-09-22 ENCOUNTER — APPOINTMENT (OUTPATIENT)
Dept: CT IMAGING | Age: 89
DRG: 083 | End: 2024-09-22
Payer: MEDICARE

## 2024-09-22 PROBLEM — S09.90XA HEAD INJURY: Status: ACTIVE | Noted: 2024-09-22

## 2024-09-22 PROBLEM — S02.2XXA CLOSED FRACTURE OF NASAL BONE: Status: ACTIVE | Noted: 2024-09-22

## 2024-09-22 PROBLEM — T14.90XA TRAUMA: Status: ACTIVE | Noted: 2024-09-22

## 2024-09-22 PROBLEM — R55 SYNCOPE: Status: ACTIVE | Noted: 2024-09-22

## 2024-09-22 PROBLEM — S02.401A MAXILLARY FRACTURE: Status: ACTIVE | Noted: 2024-09-22

## 2024-09-22 PROBLEM — S06.5XAA SDH (SUBDURAL HEMATOMA): Status: ACTIVE | Noted: 2024-09-22

## 2024-09-22 LAB
ANION GAP SERPL CALCULATED.3IONS-SCNC: 11 MMOL/L (ref 7–16)
BUN SERPL-MCNC: 10 MG/DL (ref 6–23)
CALCIUM SERPL-MCNC: 8.4 MG/DL (ref 8.6–10.2)
CHLORIDE SERPL-SCNC: 99 MMOL/L (ref 98–107)
CLOT ANGLE.KAOLIN INDUCED BLD RES TEG: 75.2 DEG (ref 53–70)
CO2 SERPL-SCNC: 23 MMOL/L (ref 22–29)
CREAT SERPL-MCNC: 0.4 MG/DL (ref 0.5–1)
EKG ATRIAL RATE: 115 BPM
EKG Q-T INTERVAL: 332 MS
EKG QRS DURATION: 78 MS
EKG QTC CALCULATION (BAZETT): 436 MS
EKG R AXIS: -13 DEGREES
EKG T AXIS: 29 DEGREES
EKG VENTRICULAR RATE: 104 BPM
EPL-TEG: 1.2 % (ref 0–15)
ETHANOLAMINE SERPL-MCNC: <10 MG/DL (ref 0–0.08)
G-TEG: 8.6 KDYN/CM2 (ref 4.5–11)
GFR, ESTIMATED: >90 ML/MIN/1.73M2
GLUCOSE SERPL-MCNC: 111 MG/DL (ref 74–99)
KINETICS TEG: 1.1 MIN (ref 1–3)
LY30 (LYSIS) TEG: 1.2 % (ref 0–8)
MA (MAX CLOT) TEG: 63.3 MM (ref 50–70)
POTASSIUM SERPL-SCNC: 4.3 MMOL/L (ref 3.5–5)
REACTION TIME TEG: 3.2 MIN (ref 5–10)
SODIUM SERPL-SCNC: 133 MMOL/L (ref 132–146)
TROPONIN I SERPL HS-MCNC: 22 NG/L (ref 0–9)
TSH SERPL DL<=0.05 MIU/L-ACNC: 2.17 UIU/ML (ref 0.27–4.2)

## 2024-09-22 PROCEDURE — 6370000000 HC RX 637 (ALT 250 FOR IP): Performed by: STUDENT IN AN ORGANIZED HEALTH CARE EDUCATION/TRAINING PROGRAM

## 2024-09-22 PROCEDURE — 36415 COLL VENOUS BLD VENIPUNCTURE: CPT

## 2024-09-22 PROCEDURE — 2060000000 HC ICU INTERMEDIATE R&B

## 2024-09-22 PROCEDURE — 2580000003 HC RX 258

## 2024-09-22 PROCEDURE — 85384 FIBRINOGEN ACTIVITY: CPT

## 2024-09-22 PROCEDURE — G0480 DRUG TEST DEF 1-7 CLASSES: HCPCS

## 2024-09-22 PROCEDURE — 85390 FIBRINOLYSINS SCREEN I&R: CPT

## 2024-09-22 PROCEDURE — 6370000000 HC RX 637 (ALT 250 FOR IP)

## 2024-09-22 PROCEDURE — 2580000003 HC RX 258: Performed by: STUDENT IN AN ORGANIZED HEALTH CARE EDUCATION/TRAINING PROGRAM

## 2024-09-22 PROCEDURE — 80048 BASIC METABOLIC PNL TOTAL CA: CPT

## 2024-09-22 PROCEDURE — 85576 BLOOD PLATELET AGGREGATION: CPT

## 2024-09-22 PROCEDURE — 84484 ASSAY OF TROPONIN QUANT: CPT

## 2024-09-22 PROCEDURE — 6370000000 HC RX 637 (ALT 250 FOR IP): Performed by: SURGERY

## 2024-09-22 PROCEDURE — 99223 1ST HOSP IP/OBS HIGH 75: CPT | Performed by: SURGERY

## 2024-09-22 PROCEDURE — 93010 ELECTROCARDIOGRAM REPORT: CPT | Performed by: INTERNAL MEDICINE

## 2024-09-22 PROCEDURE — 84443 ASSAY THYROID STIM HORMONE: CPT

## 2024-09-22 PROCEDURE — 70450 CT HEAD/BRAIN W/O DYE: CPT

## 2024-09-22 PROCEDURE — 85347 COAGULATION TIME ACTIVATED: CPT

## 2024-09-22 PROCEDURE — 6360000002 HC RX W HCPCS

## 2024-09-22 RX ORDER — LABETALOL HYDROCHLORIDE 5 MG/ML
10 INJECTION, SOLUTION INTRAVENOUS EVERY 30 MIN PRN
Status: DISCONTINUED | OUTPATIENT
Start: 2024-09-22 | End: 2024-09-24 | Stop reason: HOSPADM

## 2024-09-22 RX ORDER — POLYETHYLENE GLYCOL 3350 17 G/17G
17 POWDER, FOR SOLUTION ORAL DAILY
Status: DISCONTINUED | OUTPATIENT
Start: 2024-09-22 | End: 2024-09-24 | Stop reason: HOSPADM

## 2024-09-22 RX ORDER — SODIUM CHLORIDE 9 MG/ML
INJECTION, SOLUTION INTRAVENOUS PRN
Status: DISCONTINUED | OUTPATIENT
Start: 2024-09-22 | End: 2024-09-24 | Stop reason: HOSPADM

## 2024-09-22 RX ORDER — SODIUM CHLORIDE 0.9 % (FLUSH) 0.9 %
10 SYRINGE (ML) INJECTION PRN
Status: DISCONTINUED | OUTPATIENT
Start: 2024-09-22 | End: 2024-09-24 | Stop reason: HOSPADM

## 2024-09-22 RX ORDER — SODIUM CHLORIDE 0.9 % (FLUSH) 0.9 %
10 SYRINGE (ML) INJECTION EVERY 12 HOURS SCHEDULED
Status: DISCONTINUED | OUTPATIENT
Start: 2024-09-22 | End: 2024-09-24 | Stop reason: HOSPADM

## 2024-09-22 RX ORDER — SODIUM CHLORIDE 9 MG/ML
INJECTION, SOLUTION INTRAVENOUS CONTINUOUS
Status: DISCONTINUED | OUTPATIENT
Start: 2024-09-22 | End: 2024-09-22

## 2024-09-22 RX ORDER — ONDANSETRON 2 MG/ML
4 INJECTION INTRAMUSCULAR; INTRAVENOUS EVERY 6 HOURS PRN
Status: DISCONTINUED | OUTPATIENT
Start: 2024-09-22 | End: 2024-09-24 | Stop reason: HOSPADM

## 2024-09-22 RX ORDER — HYDRALAZINE HYDROCHLORIDE 20 MG/ML
10 INJECTION INTRAMUSCULAR; INTRAVENOUS EVERY 30 MIN PRN
Status: DISCONTINUED | OUTPATIENT
Start: 2024-09-22 | End: 2024-09-24 | Stop reason: HOSPADM

## 2024-09-22 RX ORDER — LEVETIRACETAM 500 MG/1
500 TABLET ORAL 2 TIMES DAILY
Status: DISCONTINUED | OUTPATIENT
Start: 2024-09-22 | End: 2024-09-22

## 2024-09-22 RX ORDER — LEVETIRACETAM 500 MG/1
500 TABLET ORAL 2 TIMES DAILY
Status: DISCONTINUED | OUTPATIENT
Start: 2024-09-22 | End: 2024-09-24 | Stop reason: HOSPADM

## 2024-09-22 RX ORDER — ONDANSETRON 4 MG/1
4 TABLET, ORALLY DISINTEGRATING ORAL EVERY 8 HOURS PRN
Status: DISCONTINUED | OUTPATIENT
Start: 2024-09-22 | End: 2024-09-24 | Stop reason: HOSPADM

## 2024-09-22 RX ORDER — ACETAMINOPHEN 325 MG/1
650 TABLET ORAL EVERY 4 HOURS PRN
Status: DISCONTINUED | OUTPATIENT
Start: 2024-09-22 | End: 2024-09-24 | Stop reason: HOSPADM

## 2024-09-22 RX ORDER — ATENOLOL 25 MG/1
25 TABLET ORAL DAILY
Status: DISCONTINUED | OUTPATIENT
Start: 2024-09-22 | End: 2024-09-24 | Stop reason: HOSPADM

## 2024-09-22 RX ADMIN — LEVETIRACETAM 500 MG: 500 TABLET, FILM COATED ORAL at 22:10

## 2024-09-22 RX ADMIN — SODIUM CHLORIDE, PRESERVATIVE FREE 10 ML: 5 INJECTION INTRAVENOUS at 22:10

## 2024-09-22 RX ADMIN — WATER 1000 MG: 1 INJECTION INTRAMUSCULAR; INTRAVENOUS; SUBCUTANEOUS at 10:32

## 2024-09-22 RX ADMIN — SODIUM CHLORIDE: 9 INJECTION, SOLUTION INTRAVENOUS at 04:48

## 2024-09-22 RX ADMIN — ACETAMINOPHEN 650 MG: 325 TABLET ORAL at 14:55

## 2024-09-22 RX ADMIN — SALINE NASAL SPRAY 1 SPRAY: 1.5 SOLUTION NASAL at 22:11

## 2024-09-22 RX ADMIN — LEVETIRACETAM 500 MG: 500 TABLET, FILM COATED ORAL at 10:32

## 2024-09-22 RX ADMIN — SODIUM CHLORIDE, PRESERVATIVE FREE 10 ML: 5 INJECTION INTRAVENOUS at 10:33

## 2024-09-22 RX ADMIN — ATENOLOL 25 MG: 25 TABLET ORAL at 10:32

## 2024-09-22 ASSESSMENT — PAIN DESCRIPTION - ORIENTATION: ORIENTATION: OUTER

## 2024-09-22 ASSESSMENT — PAIN DESCRIPTION - LOCATION: LOCATION: CHEST

## 2024-09-22 ASSESSMENT — PAIN SCALES - GENERAL
PAINLEVEL_OUTOF10: 9
PAINLEVEL_OUTOF10: 5

## 2024-09-22 ASSESSMENT — PAIN - FUNCTIONAL ASSESSMENT: PAIN_FUNCTIONAL_ASSESSMENT: ACTIVITIES ARE NOT PREVENTED

## 2024-09-23 ENCOUNTER — TELEPHONE (OUTPATIENT)
Dept: ENT CLINIC | Age: 89
End: 2024-09-23

## 2024-09-23 LAB
ANION GAP SERPL CALCULATED.3IONS-SCNC: 16 MMOL/L (ref 7–16)
BASOPHILS # BLD: 0.03 K/UL (ref 0–0.2)
BASOPHILS NFR BLD: 1 % (ref 0–2)
BUN SERPL-MCNC: 11 MG/DL (ref 6–23)
CALCIUM SERPL-MCNC: 8.7 MG/DL (ref 8.6–10.2)
CHLORIDE SERPL-SCNC: 102 MMOL/L (ref 98–107)
CO2 SERPL-SCNC: 18 MMOL/L (ref 22–29)
CREAT SERPL-MCNC: 0.5 MG/DL (ref 0.5–1)
EOSINOPHIL # BLD: 0.12 K/UL (ref 0.05–0.5)
EOSINOPHILS RELATIVE PERCENT: 3 % (ref 0–6)
ERYTHROCYTE [DISTWIDTH] IN BLOOD BY AUTOMATED COUNT: 12.8 % (ref 11.5–15)
GFR, ESTIMATED: 89 ML/MIN/1.73M2
GLUCOSE SERPL-MCNC: 91 MG/DL (ref 74–99)
HCT VFR BLD AUTO: 34.1 % (ref 34–48)
HGB BLD-MCNC: 11 G/DL (ref 11.5–15.5)
IMM GRANULOCYTES # BLD AUTO: <0.03 K/UL (ref 0–0.58)
IMM GRANULOCYTES NFR BLD: 0 % (ref 0–5)
LYMPHOCYTES NFR BLD: 1.46 K/UL (ref 1.5–4)
LYMPHOCYTES RELATIVE PERCENT: 30 % (ref 20–42)
MCH RBC QN AUTO: 30.8 PG (ref 26–35)
MCHC RBC AUTO-ENTMCNC: 32.3 G/DL (ref 32–34.5)
MCV RBC AUTO: 95.5 FL (ref 80–99.9)
MONOCYTES NFR BLD: 0.52 K/UL (ref 0.1–0.95)
MONOCYTES NFR BLD: 11 % (ref 2–12)
NEUTROPHILS NFR BLD: 56 % (ref 43–80)
NEUTS SEG NFR BLD: 2.68 K/UL (ref 1.8–7.3)
PLATELET # BLD AUTO: 169 K/UL (ref 130–450)
PMV BLD AUTO: 10 FL (ref 7–12)
POTASSIUM SERPL-SCNC: 3.9 MMOL/L (ref 3.5–5)
RBC # BLD AUTO: 3.57 M/UL (ref 3.5–5.5)
SODIUM SERPL-SCNC: 136 MMOL/L (ref 132–146)
WBC OTHER # BLD: 4.8 K/UL (ref 4.5–11.5)

## 2024-09-23 PROCEDURE — 97530 THERAPEUTIC ACTIVITIES: CPT

## 2024-09-23 PROCEDURE — 6360000002 HC RX W HCPCS

## 2024-09-23 PROCEDURE — 80048 BASIC METABOLIC PNL TOTAL CA: CPT

## 2024-09-23 PROCEDURE — 6370000000 HC RX 637 (ALT 250 FOR IP): Performed by: SURGERY

## 2024-09-23 PROCEDURE — 2580000003 HC RX 258

## 2024-09-23 PROCEDURE — 2580000003 HC RX 258: Performed by: STUDENT IN AN ORGANIZED HEALTH CARE EDUCATION/TRAINING PROGRAM

## 2024-09-23 PROCEDURE — 85025 COMPLETE CBC W/AUTO DIFF WBC: CPT

## 2024-09-23 PROCEDURE — 6370000000 HC RX 637 (ALT 250 FOR IP)

## 2024-09-23 PROCEDURE — 97161 PT EVAL LOW COMPLEX 20 MIN: CPT

## 2024-09-23 PROCEDURE — 36415 COLL VENOUS BLD VENIPUNCTURE: CPT

## 2024-09-23 PROCEDURE — 97535 SELF CARE MNGMENT TRAINING: CPT

## 2024-09-23 PROCEDURE — 97165 OT EVAL LOW COMPLEX 30 MIN: CPT

## 2024-09-23 PROCEDURE — 2060000000 HC ICU INTERMEDIATE R&B

## 2024-09-23 PROCEDURE — 6370000000 HC RX 637 (ALT 250 FOR IP): Performed by: STUDENT IN AN ORGANIZED HEALTH CARE EDUCATION/TRAINING PROGRAM

## 2024-09-23 PROCEDURE — 99221 1ST HOSP IP/OBS SF/LOW 40: CPT | Performed by: OTOLARYNGOLOGY

## 2024-09-23 RX ORDER — LOPERAMIDE HCL 2 MG
2 CAPSULE ORAL 3 TIMES DAILY PRN
Status: DISCONTINUED | OUTPATIENT
Start: 2024-09-23 | End: 2024-09-24 | Stop reason: HOSPADM

## 2024-09-23 RX ORDER — LOPERAMIDE HCL 2 MG
2 CAPSULE ORAL
Status: DISCONTINUED | OUTPATIENT
Start: 2024-09-23 | End: 2024-09-23

## 2024-09-23 RX ORDER — METOPROLOL SUCCINATE 25 MG/1
25 TABLET, EXTENDED RELEASE ORAL DAILY
Status: DISCONTINUED | OUTPATIENT
Start: 2024-09-23 | End: 2024-09-23

## 2024-09-23 RX ORDER — LOPERAMIDE HCL 2 MG
2 CAPSULE ORAL
COMMUNITY

## 2024-09-23 RX ORDER — AMIODARONE HYDROCHLORIDE 200 MG/1
200 TABLET ORAL DAILY
Status: DISCONTINUED | OUTPATIENT
Start: 2024-09-23 | End: 2024-09-23

## 2024-09-23 RX ADMIN — SALINE NASAL SPRAY 1 SPRAY: 1.5 SOLUTION NASAL at 21:06

## 2024-09-23 RX ADMIN — LOPERAMIDE HYDROCHLORIDE 2 MG: 2 CAPSULE ORAL at 12:27

## 2024-09-23 RX ADMIN — WATER 1000 MG: 1 INJECTION INTRAMUSCULAR; INTRAVENOUS; SUBCUTANEOUS at 08:29

## 2024-09-23 RX ADMIN — SALINE NASAL SPRAY 1 SPRAY: 1.5 SOLUTION NASAL at 15:16

## 2024-09-23 RX ADMIN — LEVETIRACETAM 500 MG: 500 TABLET, FILM COATED ORAL at 21:05

## 2024-09-23 RX ADMIN — SALINE NASAL SPRAY 1 SPRAY: 1.5 SOLUTION NASAL at 08:37

## 2024-09-23 RX ADMIN — LEVETIRACETAM 500 MG: 500 TABLET, FILM COATED ORAL at 08:31

## 2024-09-23 RX ADMIN — SODIUM CHLORIDE, PRESERVATIVE FREE 10 ML: 5 INJECTION INTRAVENOUS at 21:16

## 2024-09-23 RX ADMIN — POTASSIUM BICARBONATE 20 MEQ: 782 TABLET, EFFERVESCENT ORAL at 11:32

## 2024-09-23 RX ADMIN — ATENOLOL 25 MG: 25 TABLET ORAL at 08:31

## 2024-09-23 RX ADMIN — LOPERAMIDE HYDROCHLORIDE 2 MG: 2 CAPSULE ORAL at 18:00

## 2024-09-24 VITALS
TEMPERATURE: 98.5 F | HEIGHT: 60 IN | DIASTOLIC BLOOD PRESSURE: 100 MMHG | WEIGHT: 110 LBS | HEART RATE: 82 BPM | BODY MASS INDEX: 21.6 KG/M2 | SYSTOLIC BLOOD PRESSURE: 154 MMHG | OXYGEN SATURATION: 96 % | RESPIRATION RATE: 16 BRPM

## 2024-09-24 LAB
ANION GAP SERPL CALCULATED.3IONS-SCNC: 14 MMOL/L (ref 7–16)
BASOPHILS # BLD: 0.05 K/UL (ref 0–0.2)
BASOPHILS NFR BLD: 1 % (ref 0–2)
BUN SERPL-MCNC: 12 MG/DL (ref 6–23)
CALCIUM SERPL-MCNC: 8.6 MG/DL (ref 8.6–10.2)
CHLORIDE SERPL-SCNC: 100 MMOL/L (ref 98–107)
CO2 SERPL-SCNC: 20 MMOL/L (ref 22–29)
CREAT SERPL-MCNC: 0.6 MG/DL (ref 0.5–1)
EOSINOPHIL # BLD: 0.12 K/UL (ref 0.05–0.5)
EOSINOPHILS RELATIVE PERCENT: 2 % (ref 0–6)
ERYTHROCYTE [DISTWIDTH] IN BLOOD BY AUTOMATED COUNT: 13 % (ref 11.5–15)
GFR, ESTIMATED: 86 ML/MIN/1.73M2
GLUCOSE SERPL-MCNC: 86 MG/DL (ref 74–99)
HCT VFR BLD AUTO: 35.5 % (ref 34–48)
HGB BLD-MCNC: 11.7 G/DL (ref 11.5–15.5)
IMM GRANULOCYTES # BLD AUTO: <0.03 K/UL (ref 0–0.58)
IMM GRANULOCYTES NFR BLD: 0 % (ref 0–5)
LYMPHOCYTES NFR BLD: 1.62 K/UL (ref 1.5–4)
LYMPHOCYTES RELATIVE PERCENT: 27 % (ref 20–42)
MCH RBC QN AUTO: 31.1 PG (ref 26–35)
MCHC RBC AUTO-ENTMCNC: 33 G/DL (ref 32–34.5)
MCV RBC AUTO: 94.4 FL (ref 80–99.9)
MONOCYTES NFR BLD: 0.69 K/UL (ref 0.1–0.95)
MONOCYTES NFR BLD: 11 % (ref 2–12)
NEUTROPHILS NFR BLD: 59 % (ref 43–80)
NEUTS SEG NFR BLD: 3.62 K/UL (ref 1.8–7.3)
PLATELET # BLD AUTO: 183 K/UL (ref 130–450)
PMV BLD AUTO: 10.4 FL (ref 7–12)
POTASSIUM SERPL-SCNC: 4.6 MMOL/L (ref 3.5–5)
RBC # BLD AUTO: 3.76 M/UL (ref 3.5–5.5)
SODIUM SERPL-SCNC: 134 MMOL/L (ref 132–146)
WBC OTHER # BLD: 6.1 K/UL (ref 4.5–11.5)

## 2024-09-24 PROCEDURE — 85025 COMPLETE CBC W/AUTO DIFF WBC: CPT

## 2024-09-24 PROCEDURE — 6370000000 HC RX 637 (ALT 250 FOR IP): Performed by: STUDENT IN AN ORGANIZED HEALTH CARE EDUCATION/TRAINING PROGRAM

## 2024-09-24 PROCEDURE — 6370000000 HC RX 637 (ALT 250 FOR IP): Performed by: SURGERY

## 2024-09-24 PROCEDURE — 99232 SBSQ HOSP IP/OBS MODERATE 35: CPT | Performed by: STUDENT IN AN ORGANIZED HEALTH CARE EDUCATION/TRAINING PROGRAM

## 2024-09-24 PROCEDURE — 80048 BASIC METABOLIC PNL TOTAL CA: CPT

## 2024-09-24 PROCEDURE — 2580000003 HC RX 258

## 2024-09-24 PROCEDURE — 6360000002 HC RX W HCPCS

## 2024-09-24 PROCEDURE — 36415 COLL VENOUS BLD VENIPUNCTURE: CPT

## 2024-09-24 RX ORDER — ENOXAPARIN SODIUM 100 MG/ML
30 INJECTION SUBCUTANEOUS 2 TIMES DAILY
Status: DISCONTINUED | OUTPATIENT
Start: 2024-09-24 | End: 2024-09-24 | Stop reason: HOSPADM

## 2024-09-24 RX ORDER — ENOXAPARIN SODIUM 100 MG/ML
40 INJECTION SUBCUTANEOUS DAILY
Status: DISCONTINUED | OUTPATIENT
Start: 2024-09-24 | End: 2024-09-24

## 2024-09-24 RX ORDER — ATENOLOL 25 MG/1
25 TABLET ORAL DAILY
Qty: 30 TABLET | Refills: 3 | Status: SHIPPED | OUTPATIENT
Start: 2024-09-25

## 2024-09-24 RX ORDER — SULFAMETHOXAZOLE/TRIMETHOPRIM 800-160 MG
1 TABLET ORAL 2 TIMES DAILY
Qty: 6 TABLET | Refills: 0 | Status: SHIPPED | OUTPATIENT
Start: 2024-09-24 | End: 2024-09-27

## 2024-09-24 RX ORDER — LEVETIRACETAM 500 MG/1
500 TABLET ORAL 2 TIMES DAILY
Qty: 9 TABLET | Refills: 0 | Status: SHIPPED | OUTPATIENT
Start: 2024-09-24 | End: 2024-09-29

## 2024-09-24 RX ADMIN — ATENOLOL 25 MG: 25 TABLET ORAL at 09:23

## 2024-09-24 RX ADMIN — ACETAMINOPHEN 650 MG: 325 TABLET ORAL at 03:30

## 2024-09-24 RX ADMIN — LEVETIRACETAM 500 MG: 500 TABLET, FILM COATED ORAL at 09:23

## 2024-09-24 RX ADMIN — WATER 1000 MG: 1 INJECTION INTRAMUSCULAR; INTRAVENOUS; SUBCUTANEOUS at 09:23

## 2024-09-24 ASSESSMENT — PAIN SCALES - GENERAL
PAINLEVEL_OUTOF10: 0
PAINLEVEL_OUTOF10: 4

## 2024-09-24 ASSESSMENT — PAIN DESCRIPTION - DESCRIPTORS: DESCRIPTORS: ACHING

## 2024-09-24 ASSESSMENT — PAIN DESCRIPTION - ONSET: ONSET: GRADUAL

## 2024-09-24 ASSESSMENT — PAIN - FUNCTIONAL ASSESSMENT: PAIN_FUNCTIONAL_ASSESSMENT: ACTIVITIES ARE NOT PREVENTED

## 2024-09-24 ASSESSMENT — PAIN DESCRIPTION - LOCATION: LOCATION: HEAD

## 2024-09-24 ASSESSMENT — PAIN DESCRIPTION - FREQUENCY: FREQUENCY: INTERMITTENT

## 2024-09-25 ENCOUNTER — CARE COORDINATION (OUTPATIENT)
Dept: CARE COORDINATION | Age: 89
End: 2024-09-25

## 2024-09-25 RX ORDER — LEVETIRACETAM 500 MG/1
TABLET ORAL
Qty: 9 TABLET | Refills: 0 | OUTPATIENT
Start: 2024-09-25

## 2024-09-27 ENCOUNTER — TELEPHONE (OUTPATIENT)
Dept: NEUROSURGERY | Age: 89
End: 2024-09-27

## 2024-09-27 DIAGNOSIS — S06.5XAA SDH (SUBDURAL HEMATOMA): Primary | ICD-10-CM

## 2024-09-27 NOTE — TELEPHONE ENCOUNTER
Patient has an acute SDH, would recommend postponing cataract surgery until after follow up.  She is okay to continue with weekly hair appointments

## 2024-09-27 NOTE — TELEPHONE ENCOUNTER
Patient's daughter in law called. Patient is scheduled for cataract surgery on October 9. She has hospital follow up with us on 10-30. She wants to know if she should postpone surgery due to patient's fall.  [phone] phone    Patient gets weekly hair appt with shampoo and set. She wants to know if she can do this?

## 2024-09-30 ENCOUNTER — TELEPHONE (OUTPATIENT)
Dept: NEUROSURGERY | Age: 89
End: 2024-09-30

## 2024-09-30 NOTE — TELEPHONE ENCOUNTER
Diana, patients daughter called regarding her eliqis Rx. Dr. Carranza took her off Eliquis due to her being a high risk factor for a stroke. Her cardiologist has shown concern that she needs to be on this medication. Diana would like a call if possible to [phone]. Thank you.

## 2024-10-01 ENCOUNTER — OFFICE VISIT (OUTPATIENT)
Dept: ENT CLINIC | Age: 89
End: 2024-10-01
Payer: MEDICARE

## 2024-10-01 VITALS
DIASTOLIC BLOOD PRESSURE: 81 MMHG | SYSTOLIC BLOOD PRESSURE: 125 MMHG | HEIGHT: 60 IN | HEART RATE: 88 BPM | WEIGHT: 114.4 LBS | BODY MASS INDEX: 22.46 KG/M2

## 2024-10-01 DIAGNOSIS — R09.81 CHRONIC NASAL CONGESTION: ICD-10-CM

## 2024-10-01 DIAGNOSIS — S02.2XXB OPEN FRACTURE OF NASAL BONE, INITIAL ENCOUNTER: ICD-10-CM

## 2024-10-01 DIAGNOSIS — J34.2 DNS (DEVIATED NASAL SEPTUM): Primary | ICD-10-CM

## 2024-10-01 PROCEDURE — 1123F ACP DISCUSS/DSCN MKR DOCD: CPT | Performed by: OTOLARYNGOLOGY

## 2024-10-01 PROCEDURE — 99203 OFFICE O/P NEW LOW 30 MIN: CPT | Performed by: OTOLARYNGOLOGY

## 2024-10-01 PROCEDURE — 21315 CLSD TX NSL FX MNPJ WO STBLJ: CPT | Performed by: OTOLARYNGOLOGY

## 2024-10-01 NOTE — TELEPHONE ENCOUNTER
Patients daughter called again. She said they were at the Cardiologist yesterday and the cardiologist is concerned about taking her off Eliquis due to her Afib. Please contact Diana at your earliest convenience.
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
## 2024-10-03 ENCOUNTER — CARE COORDINATION (OUTPATIENT)
Dept: CARE COORDINATION | Age: 89
End: 2024-10-03

## 2024-10-03 NOTE — CARE COORDINATION
Reports: CTn confirmed with patient DIL (Diana) that she is finished with Keppra and Bactrim that was ordered on hops d/c.  Do you have any questions related to your medications?: No  Do you currently have any active services?: No  Do you have any needs or concerns that I can assist you with?: No  Identified Barriers: None  Care Transitions Interventions  No Identified Needs  Other Interventions:              Follow Up Appointment:   STELLA appointment attended as scheduled   Future Appointments         Provider Specialty Dept Phone    10/30/2024 9:30 AM (Arrive by 9:15 AM) [facility] CT SCAN 3 Radiology [phone]    10/30/2024 10:45 AM Elisabeth Salinas PA-C Neurosurgery [phone]            Care Transition Nurse provided contact information.  Plan for follow-up call in 6-10 days based on severity of symptoms and risk factors.  Plan for next call:  Symptom check      MARTA Pedroza

## 2024-10-10 ASSESSMENT — ENCOUNTER SYMPTOMS
COUGH: 0
VOMITING: 0
SHORTNESS OF BREATH: 0

## 2024-10-11 ENCOUNTER — CARE COORDINATION (OUTPATIENT)
Dept: CARE COORDINATION | Age: 89
End: 2024-10-11

## 2024-10-11 NOTE — CARE COORDINATION
Care Transitions Note    Follow Up Call     Attempted to reach patient for transitions of care follow up.  Unable to reach patient.      Outreach Attempts:   HIPAA compliant voicemail left for patient.     Care Summary Note: 1st attempt     Follow Up Appointment:   Future Appointments         Provider Specialty Dept Phone    10/30/2024 9:30 AM (Arrive by 9:15 AM) [facility] CT SCAN 3 Radiology [phone]    10/30/2024 10:45 AM Elisabeth Salinas PA-C Neurosurgery [phone]            Plan for follow-up call in 2-5 days based on severity of symptoms and risk factors. Plan for next call:  Symptom check      Brenda Paez LPN

## 2024-10-16 ENCOUNTER — CARE COORDINATION (OUTPATIENT)
Dept: CARE COORDINATION | Age: 89
End: 2024-10-16

## 2024-10-16 NOTE — CARE COORDINATION
have any active services?: No  Do you have any needs or concerns that I can assist you with?: No  Identified Barriers: Other  Care Transitions Interventions  No Identified Needs  Other Interventions:              Follow Up Appointment:   Reviewed upcoming appointment(s).  Future Appointments         Provider Specialty Dept Phone    10/30/2024 9:30 AM (Arrive by 9:15 AM) [facility] CT SCAN 3 Radiology [phone]    10/30/2024 10:45 AM Elisabeth Salinas PA-C Neurosurgery [phone]            Care Transition Nurse provided contact information.  Plan for follow-up call in 6-10 days based on severity of symptoms and risk factors.  Plan for next call:  Symptom check and prepare for final call.      MARTA Pedroza

## 2024-10-23 ENCOUNTER — CARE COORDINATION (OUTPATIENT)
Dept: CARE COORDINATION | Age: 89
End: 2024-10-23

## 2024-10-23 NOTE — CARE COORDINATION
Appointments         Provider Specialty Dept Phone    10/30/2024 9:30 AM (Arrive by 9:15 AM) [facility] CT SCAN 3 Radiology [phone]    10/30/2024 10:45 AM Elisabeth Salinas PA-C Neurosurgery [phone]            Patient has agreed to contact primary care provider and/or specialist for any further questions, concerns, or needs.    Letha Estes, APRN

## 2024-10-30 ENCOUNTER — OFFICE VISIT (OUTPATIENT)
Dept: NEUROSURGERY | Age: 89
End: 2024-10-30

## 2024-10-30 ENCOUNTER — HOSPITAL ENCOUNTER (OUTPATIENT)
Dept: CT IMAGING | Age: 89
Discharge: HOME | End: 2024-11-01
Attending: NEUROLOGICAL SURGERY
Payer: MEDICARE

## 2024-10-30 VITALS
BODY MASS INDEX: 22.33 KG/M2 | TEMPERATURE: 97.3 F | DIASTOLIC BLOOD PRESSURE: 79 MMHG | SYSTOLIC BLOOD PRESSURE: 148 MMHG | HEIGHT: 60 IN | HEART RATE: 98 BPM | OXYGEN SATURATION: 100 %

## 2024-10-30 DIAGNOSIS — S06.5XAA SDH (SUBDURAL HEMATOMA): ICD-10-CM

## 2024-10-30 DIAGNOSIS — S06.5XAA SDH (SUBDURAL HEMATOMA): Primary | ICD-10-CM

## 2024-10-30 PROCEDURE — 70450 CT HEAD/BRAIN W/O DYE: CPT

## 2024-10-30 NOTE — PROGRESS NOTES
Hospital Follow-up     Subjective: Soraya Hammond is a 89 y.o.  female who was initially seen in the hospital 9/21/24 after falling. She was found to have suffered a subdural hematoma along the falx. Patient was previously taking Eliquis for Afib which was stopped at that time. She presents to the office today for a 1 month follow with her daughter.     Patient states she has been doing well. Slight headache but resolved on its own. Denies any other complaints or falls since discharge. Repeat head CT scan reviewed.     Physical Exam:              WDWN, no apparent distress              Non-labored breathing               Vitals Stable              Alert and oriented x3              CN 3-12 intact              PERRL              EOMI              TRACY well              Motor strength symmetric              Sensation to LT intact bilaterally                  Imaging: Head CT scan 10/30/24: Previous hemorrhage completely resolved. No other acute hemorrhages or acute abnormalities noted. Final report pending.      Assessment: This is a 89 y.o.  female presenting for a 1 month follow-up for SDH     Plan:  -Pain control and expectations discussed  -Repeat head CT scan reviewed. Hemorrhages resolved.   -Okay to restart Eliquis. Okay for any AP/AC/NSAIDs  -OARRS report reviewed  -Follow-up in neurosurgery clinic PRN  -Call or return to neurosurgery office sooner if symptoms worsen or if new issues arise in the interim.    Electronically signed by Elisabeth Salinas PA-C on 10/30/2024 at 10:26 AM

## 2024-12-26 ENCOUNTER — HOSPITAL ENCOUNTER (EMERGENCY)
Age: 88
Discharge: ANOTHER HOSPITAL | End: 2024-12-26
Payer: MEDICARE

## 2024-12-26 VITALS
RESPIRATION RATE: 18 BRPM | WEIGHT: 110 LBS | SYSTOLIC BLOOD PRESSURE: 146 MMHG | DIASTOLIC BLOOD PRESSURE: 86 MMHG | TEMPERATURE: 97.8 F | HEART RATE: 88 BPM | OXYGEN SATURATION: 98 % | BODY MASS INDEX: 21.47 KG/M2

## 2024-12-26 DIAGNOSIS — S09.90XA INJURY OF HEAD, INITIAL ENCOUNTER: Primary | ICD-10-CM

## 2024-12-26 DIAGNOSIS — R31.9 HEMATURIA, UNSPECIFIED TYPE: ICD-10-CM

## 2024-12-26 PROCEDURE — 99211 OFF/OP EST MAY X REQ PHY/QHP: CPT

## 2024-12-26 ASSESSMENT — PAIN DESCRIPTION - DESCRIPTORS: DESCRIPTORS: ACHING;DISCOMFORT;SORE

## 2024-12-26 ASSESSMENT — PAIN DESCRIPTION - FREQUENCY: FREQUENCY: INTERMITTENT

## 2024-12-26 ASSESSMENT — PAIN - FUNCTIONAL ASSESSMENT: PAIN_FUNCTIONAL_ASSESSMENT: 0-10

## 2024-12-26 ASSESSMENT — PAIN DESCRIPTION - LOCATION: LOCATION: BUTTOCKS

## 2024-12-26 ASSESSMENT — PAIN SCALES - GENERAL: PAINLEVEL_OUTOF10: 6

## 2024-12-26 ASSESSMENT — PAIN DESCRIPTION - ORIENTATION: ORIENTATION: RIGHT

## 2024-12-26 NOTE — DISCHARGE INSTRUCTIONS
Proceed directly to the nearest emergency department for reevaluation of hematuria and a closed head injury while on Eliquis.

## 2025-03-01 ENCOUNTER — HOSPITAL ENCOUNTER (INPATIENT)
Age: 89
LOS: 3 days | Discharge: HOME | DRG: 645 | End: 2025-03-04
Attending: EMERGENCY MEDICINE | Admitting: INTERNAL MEDICINE
Payer: MEDICARE

## 2025-03-01 ENCOUNTER — APPOINTMENT (OUTPATIENT)
Dept: CT IMAGING | Age: 89
DRG: 645 | End: 2025-03-01
Payer: MEDICARE

## 2025-03-01 ENCOUNTER — APPOINTMENT (OUTPATIENT)
Dept: GENERAL RADIOLOGY | Age: 89
DRG: 645 | End: 2025-03-01
Payer: MEDICARE

## 2025-03-01 DIAGNOSIS — E87.1 HYPONATREMIA: Primary | ICD-10-CM

## 2025-03-01 DIAGNOSIS — J10.1 INFLUENZA A: ICD-10-CM

## 2025-03-01 DIAGNOSIS — E86.0 DEHYDRATION: ICD-10-CM

## 2025-03-01 LAB
ALBUMIN SERPL-MCNC: 4.5 G/DL (ref 3.5–5.2)
ALP SERPL-CCNC: 110 U/L (ref 35–104)
ALT SERPL-CCNC: 19 U/L (ref 0–32)
ANION GAP SERPL CALCULATED.3IONS-SCNC: 13 MMOL/L (ref 7–16)
AST SERPL-CCNC: 29 U/L (ref 0–31)
B PARAP IS1001 DNA NPH QL NAA+NON-PROBE: NOT DETECTED
B PERT DNA SPEC QL NAA+PROBE: NOT DETECTED
BASOPHILS # BLD: 0 K/UL (ref 0–0.2)
BASOPHILS NFR BLD: 0 % (ref 0–2)
BILIRUB SERPL-MCNC: 0.7 MG/DL (ref 0–1.2)
BILIRUB UR QL STRIP: NEGATIVE
BUN SERPL-MCNC: 14 MG/DL (ref 6–23)
C PNEUM DNA NPH QL NAA+NON-PROBE: NOT DETECTED
CALCIUM SERPL-MCNC: 8.8 MG/DL (ref 8.6–10.2)
CHLORIDE SERPL-SCNC: 89 MMOL/L (ref 98–107)
CHLORIDE UR-SCNC: 71 MMOL/L
CLARITY UR: ABNORMAL
CO2 SERPL-SCNC: 21 MMOL/L (ref 22–29)
COLOR UR: YELLOW
CREAT SERPL-MCNC: 0.5 MG/DL (ref 0.5–1)
CREAT UR-MCNC: 71 MG/DL (ref 29–226)
EOSINOPHIL # BLD: 0 K/UL (ref 0.05–0.5)
EOSINOPHILS RELATIVE PERCENT: 0 % (ref 0–6)
ERYTHROCYTE [DISTWIDTH] IN BLOOD BY AUTOMATED COUNT: 12.5 % (ref 11.5–15)
FLUAV H1 2009 PAN RNA NPH NAA+NON-PROBE: DETECTED
FLUAV RNA NPH QL NAA+NON-PROBE: DETECTED
FLUBV RNA NPH QL NAA+NON-PROBE: NOT DETECTED
GFR, ESTIMATED: 87 ML/MIN/1.73M2
GLUCOSE SERPL-MCNC: 124 MG/DL (ref 74–99)
GLUCOSE UR STRIP-MCNC: NEGATIVE MG/DL
HADV DNA NPH QL NAA+NON-PROBE: NOT DETECTED
HCOV 229E RNA NPH QL NAA+NON-PROBE: NOT DETECTED
HCOV HKU1 RNA NPH QL NAA+NON-PROBE: NOT DETECTED
HCOV NL63 RNA NPH QL NAA+NON-PROBE: NOT DETECTED
HCOV OC43 RNA NPH QL NAA+NON-PROBE: NOT DETECTED
HCT VFR BLD AUTO: 34.4 % (ref 34–48)
HGB BLD-MCNC: 11.7 G/DL (ref 11.5–15.5)
HGB UR QL STRIP.AUTO: ABNORMAL
HMPV RNA NPH QL NAA+NON-PROBE: NOT DETECTED
HPIV1 RNA NPH QL NAA+NON-PROBE: NOT DETECTED
HPIV2 RNA NPH QL NAA+NON-PROBE: NOT DETECTED
HPIV3 RNA NPH QL NAA+NON-PROBE: NOT DETECTED
HPIV4 RNA NPH QL NAA+NON-PROBE: NOT DETECTED
IMM GRANULOCYTES # BLD AUTO: <0.03 K/UL (ref 0–0.58)
IMM GRANULOCYTES NFR BLD: 0 % (ref 0–5)
KETONES UR STRIP-MCNC: 15 MG/DL
LACTATE BLDV-SCNC: 1.2 MMOL/L (ref 0.5–2.2)
LEUKOCYTE ESTERASE UR QL STRIP: ABNORMAL
LIPASE SERPL-CCNC: 11 U/L (ref 13–60)
LYMPHOCYTES NFR BLD: 0.38 K/UL (ref 1.5–4)
LYMPHOCYTES RELATIVE PERCENT: 10 % (ref 20–42)
M PNEUMO DNA NPH QL NAA+NON-PROBE: NOT DETECTED
MCH RBC QN AUTO: 31.7 PG (ref 26–35)
MCHC RBC AUTO-ENTMCNC: 34 G/DL (ref 32–34.5)
MCV RBC AUTO: 93.2 FL (ref 80–99.9)
MONOCYTES NFR BLD: 0.55 K/UL (ref 0.1–0.95)
MONOCYTES NFR BLD: 14 % (ref 2–12)
NEUTROPHILS NFR BLD: 76 % (ref 43–80)
NEUTS SEG NFR BLD: 2.92 K/UL (ref 1.8–7.3)
NITRITE UR QL STRIP: NEGATIVE
PH UR STRIP: 6 [PH] (ref 5–8)
PLATELET # BLD AUTO: 147 K/UL (ref 130–450)
PMV BLD AUTO: 9.8 FL (ref 7–12)
POTASSIUM SERPL-SCNC: 3.8 MMOL/L (ref 3.5–5)
PROT SERPL-MCNC: 6.6 G/DL (ref 6.4–8.3)
PROT UR STRIP-MCNC: 30 MG/DL
RBC # BLD AUTO: 3.69 M/UL (ref 3.5–5.5)
RBC # BLD: NORMAL 10*6/UL
RBC #/AREA URNS HPF: ABNORMAL /HPF
RSV RNA NPH QL NAA+NON-PROBE: NOT DETECTED
RV+EV RNA NPH QL NAA+NON-PROBE: NOT DETECTED
SARS-COV-2 RNA NPH QL NAA+NON-PROBE: NOT DETECTED
SODIUM SERPL-SCNC: 123 MMOL/L (ref 132–146)
SODIUM UR-SCNC: 60 MMOL/L
SP GR UR STRIP: 1.02 (ref 1–1.03)
SPECIMEN DESCRIPTION: ABNORMAL
TROPONIN I SERPL HS-MCNC: 20 NG/L (ref 0–9)
TROPONIN I SERPL HS-MCNC: 21 NG/L (ref 0–9)
UROBILINOGEN UR STRIP-ACNC: 1 EU/DL (ref 0–1)
WBC #/AREA URNS HPF: ABNORMAL /HPF
WBC OTHER # BLD: 3.9 K/UL (ref 4.5–11.5)

## 2025-03-01 PROCEDURE — 2140000000 HC CCU INTERMEDIATE R&B

## 2025-03-01 PROCEDURE — 81001 URINALYSIS AUTO W/SCOPE: CPT

## 2025-03-01 PROCEDURE — 82570 ASSAY OF URINE CREATININE: CPT

## 2025-03-01 PROCEDURE — 83605 ASSAY OF LACTIC ACID: CPT

## 2025-03-01 PROCEDURE — 2580000003 HC RX 258

## 2025-03-01 PROCEDURE — 84484 ASSAY OF TROPONIN QUANT: CPT

## 2025-03-01 PROCEDURE — 83690 ASSAY OF LIPASE: CPT

## 2025-03-01 PROCEDURE — 71045 X-RAY EXAM CHEST 1 VIEW: CPT

## 2025-03-01 PROCEDURE — 85025 COMPLETE CBC W/AUTO DIFF WBC: CPT

## 2025-03-01 PROCEDURE — 96361 HYDRATE IV INFUSION ADD-ON: CPT

## 2025-03-01 PROCEDURE — 99285 EMERGENCY DEPT VISIT HI MDM: CPT

## 2025-03-01 PROCEDURE — 93005 ELECTROCARDIOGRAM TRACING: CPT

## 2025-03-01 PROCEDURE — 82436 ASSAY OF URINE CHLORIDE: CPT

## 2025-03-01 PROCEDURE — 83935 ASSAY OF URINE OSMOLALITY: CPT

## 2025-03-01 PROCEDURE — 6370000000 HC RX 637 (ALT 250 FOR IP)

## 2025-03-01 PROCEDURE — 80053 COMPREHEN METABOLIC PANEL: CPT

## 2025-03-01 PROCEDURE — 96360 HYDRATION IV INFUSION INIT: CPT

## 2025-03-01 PROCEDURE — 0202U NFCT DS 22 TRGT SARS-COV-2: CPT

## 2025-03-01 PROCEDURE — 70450 CT HEAD/BRAIN W/O DYE: CPT

## 2025-03-01 PROCEDURE — 84300 ASSAY OF URINE SODIUM: CPT

## 2025-03-01 RX ORDER — 0.9 % SODIUM CHLORIDE 0.9 %
1000 INTRAVENOUS SOLUTION INTRAVENOUS ONCE
Status: COMPLETED | OUTPATIENT
Start: 2025-03-01 | End: 2025-03-01

## 2025-03-01 RX ORDER — SODIUM CHLORIDE 9 MG/ML
INJECTION, SOLUTION INTRAVENOUS CONTINUOUS
Status: DISCONTINUED | OUTPATIENT
Start: 2025-03-01 | End: 2025-03-04 | Stop reason: HOSPADM

## 2025-03-01 RX ORDER — OSELTAMIVIR PHOSPHATE 75 MG/1
75 CAPSULE ORAL ONCE
Status: COMPLETED | OUTPATIENT
Start: 2025-03-01 | End: 2025-03-01

## 2025-03-01 RX ADMIN — SODIUM CHLORIDE: 9 INJECTION, SOLUTION INTRAVENOUS at 22:23

## 2025-03-01 RX ADMIN — OSELTAMIVIR PHOSPHATE 75 MG: 75 CAPSULE ORAL at 22:25

## 2025-03-01 RX ADMIN — SODIUM CHLORIDE 1000 ML: 0.9 INJECTION, SOLUTION INTRAVENOUS at 19:17

## 2025-03-01 NOTE — ED NOTES
Department of Emergency Medicine  FIRST PROVIDER TRIAGE NOTE             Independent MLP           3/1/25  6:23 PM EST    Date of Encounter: 3/1/25   MRN: [id number]      HPI: Soraya Hammond is a 89 y.o. female who presents to the ED for Fatigue (Per family she has not been able to eat or drink. Unable to keep medication down. Having blood in her urine, \"short bursts\" of headaches lasting less than a minute. ), Hematuria, Headache, and Vomiting       ROS: Negative for abd pain.    PE: Gen Appearance/Constitutional: alert  HEENT: Airway patent.    Initial Plan of Care: All treatment areas with department are currently occupied.      Plan to order/Initiate the following while awaiting opening in ED: Triage evaluation  .     Provider-Patient relationship only established for Provider In Triage (PIT).  Full assessment, HPI and examination not performed, therefore, it is not yet possible to state whether or not an emergency medical condition exists     Initial Plan of Care: Initiate Treatment-Testing, Proceed toTreatment Area When Bed Available for ED Attending/MLP to Continue Care  Secondary to high volume, low staffing, and/or boarding- patient to await bed availability.     This ends my PIT-Patient relationship.  Care of patient relinquished after triage         Electronically signed by MARTA Kaur NP   DD: 3/1/25    done

## 2025-03-01 NOTE — ED PROVIDER NOTES
the dictations but occasionally words are mis-transcribed.)    Vasquez Valentin,  (electronically signed)

## 2025-03-02 PROBLEM — J10.1 INFLUENZA A: Status: ACTIVE | Noted: 2025-03-02

## 2025-03-02 LAB
ALBUMIN SERPL-MCNC: 3.9 G/DL (ref 3.5–5.2)
ALP SERPL-CCNC: 97 U/L (ref 35–104)
ALT SERPL-CCNC: 22 U/L (ref 0–32)
ANION GAP SERPL CALCULATED.3IONS-SCNC: 10 MMOL/L (ref 7–16)
AST SERPL-CCNC: 34 U/L (ref 0–31)
BILIRUB DIRECT SERPL-MCNC: 0.2 MG/DL (ref 0–0.3)
BILIRUB INDIRECT SERPL-MCNC: 0.4 MG/DL (ref 0–1)
BILIRUB SERPL-MCNC: 0.6 MG/DL (ref 0–1.2)
BUN SERPL-MCNC: 13 MG/DL (ref 6–23)
CALCIUM SERPL-MCNC: 8.3 MG/DL (ref 8.6–10.2)
CHLORIDE SERPL-SCNC: 94 MMOL/L (ref 98–107)
CO2 SERPL-SCNC: 23 MMOL/L (ref 22–29)
CREAT SERPL-MCNC: 0.5 MG/DL (ref 0.5–1)
GFR, ESTIMATED: 90 ML/MIN/1.73M2
GLUCOSE SERPL-MCNC: 90 MG/DL (ref 74–99)
MAGNESIUM SERPL-MCNC: 1.6 MG/DL (ref 1.6–2.6)
OSMOLALITY UR: 516 MOSM/KG (ref 300–900)
POTASSIUM SERPL-SCNC: 3 MMOL/L (ref 3.5–5)
PROT SERPL-MCNC: 5.6 G/DL (ref 6.4–8.3)
SODIUM SERPL-SCNC: 127 MMOL/L (ref 132–146)
TROPONIN I SERPL HS-MCNC: 21 NG/L (ref 0–9)

## 2025-03-02 PROCEDURE — 82248 BILIRUBIN DIRECT: CPT

## 2025-03-02 PROCEDURE — 83735 ASSAY OF MAGNESIUM: CPT

## 2025-03-02 PROCEDURE — 6370000000 HC RX 637 (ALT 250 FOR IP): Performed by: NURSE PRACTITIONER

## 2025-03-02 PROCEDURE — 6370000000 HC RX 637 (ALT 250 FOR IP): Performed by: INTERNAL MEDICINE

## 2025-03-02 PROCEDURE — 2140000000 HC CCU INTERMEDIATE R&B

## 2025-03-02 PROCEDURE — 36415 COLL VENOUS BLD VENIPUNCTURE: CPT

## 2025-03-02 PROCEDURE — 84484 ASSAY OF TROPONIN QUANT: CPT

## 2025-03-02 PROCEDURE — 2500000003 HC RX 250 WO HCPCS: Performed by: NURSE PRACTITIONER

## 2025-03-02 PROCEDURE — 6360000002 HC RX W HCPCS: Performed by: INTERNAL MEDICINE

## 2025-03-02 PROCEDURE — 2580000003 HC RX 258: Performed by: NURSE PRACTITIONER

## 2025-03-02 PROCEDURE — 6370000000 HC RX 637 (ALT 250 FOR IP)

## 2025-03-02 PROCEDURE — 80053 COMPREHEN METABOLIC PANEL: CPT

## 2025-03-02 RX ORDER — OSELTAMIVIR PHOSPHATE 75 MG/1
75 CAPSULE ORAL ONCE
Status: DISCONTINUED | OUTPATIENT
Start: 2025-03-02 | End: 2025-03-02

## 2025-03-02 RX ORDER — POTASSIUM CHLORIDE 7.45 MG/ML
10 INJECTION INTRAVENOUS PRN
Status: DISCONTINUED | OUTPATIENT
Start: 2025-03-02 | End: 2025-03-02

## 2025-03-02 RX ORDER — SODIUM CHLORIDE 9 MG/ML
INJECTION, SOLUTION INTRAVENOUS PRN
Status: DISCONTINUED | OUTPATIENT
Start: 2025-03-02 | End: 2025-03-04 | Stop reason: HOSPADM

## 2025-03-02 RX ORDER — ATENOLOL 25 MG/1
25 TABLET ORAL DAILY
Status: DISCONTINUED | OUTPATIENT
Start: 2025-03-02 | End: 2025-03-03

## 2025-03-02 RX ORDER — LOPERAMIDE HYDROCHLORIDE 2 MG/1
2 CAPSULE ORAL
Status: DISCONTINUED | OUTPATIENT
Start: 2025-03-02 | End: 2025-03-04 | Stop reason: HOSPADM

## 2025-03-02 RX ORDER — ACETAMINOPHEN 325 MG/1
650 TABLET ORAL EVERY 6 HOURS PRN
Status: DISCONTINUED | OUTPATIENT
Start: 2025-03-02 | End: 2025-03-04 | Stop reason: HOSPADM

## 2025-03-02 RX ORDER — POLYETHYLENE GLYCOL 3350 17 G/17G
17 POWDER, FOR SOLUTION ORAL DAILY PRN
Status: DISCONTINUED | OUTPATIENT
Start: 2025-03-02 | End: 2025-03-04 | Stop reason: HOSPADM

## 2025-03-02 RX ORDER — OSELTAMIVIR PHOSPHATE 30 MG/1
30 CAPSULE ORAL 2 TIMES DAILY
Status: DISCONTINUED | OUTPATIENT
Start: 2025-03-02 | End: 2025-03-04 | Stop reason: HOSPADM

## 2025-03-02 RX ORDER — ONDANSETRON 4 MG/1
4 TABLET, ORALLY DISINTEGRATING ORAL EVERY 8 HOURS PRN
Status: DISCONTINUED | OUTPATIENT
Start: 2025-03-02 | End: 2025-03-04 | Stop reason: HOSPADM

## 2025-03-02 RX ORDER — OSELTAMIVIR PHOSPHATE 30 MG/1
30 CAPSULE ORAL 2 TIMES DAILY
Status: DISCONTINUED | OUTPATIENT
Start: 2025-03-02 | End: 2025-03-02

## 2025-03-02 RX ORDER — MAGNESIUM SULFATE IN WATER 40 MG/ML
2000 INJECTION, SOLUTION INTRAVENOUS ONCE
Status: COMPLETED | OUTPATIENT
Start: 2025-03-02 | End: 2025-03-02

## 2025-03-02 RX ORDER — SODIUM CHLORIDE 0.9 % (FLUSH) 0.9 %
5-40 SYRINGE (ML) INJECTION PRN
Status: DISCONTINUED | OUTPATIENT
Start: 2025-03-02 | End: 2025-03-04 | Stop reason: HOSPADM

## 2025-03-02 RX ORDER — POTASSIUM CHLORIDE 1500 MG/1
40 TABLET, EXTENDED RELEASE ORAL 2 TIMES DAILY WITH MEALS
Status: COMPLETED | OUTPATIENT
Start: 2025-03-02 | End: 2025-03-02

## 2025-03-02 RX ORDER — SODIUM CHLORIDE 0.9 % (FLUSH) 0.9 %
5-40 SYRINGE (ML) INJECTION EVERY 12 HOURS SCHEDULED
Status: DISCONTINUED | OUTPATIENT
Start: 2025-03-02 | End: 2025-03-04 | Stop reason: HOSPADM

## 2025-03-02 RX ORDER — FUROSEMIDE 20 MG/1
20 TABLET ORAL DAILY
Status: DISCONTINUED | OUTPATIENT
Start: 2025-03-02 | End: 2025-03-02

## 2025-03-02 RX ORDER — LOPERAMIDE HYDROCHLORIDE 2 MG/1
2 CAPSULE ORAL 4 TIMES DAILY PRN
Status: DISCONTINUED | OUTPATIENT
Start: 2025-03-02 | End: 2025-03-04 | Stop reason: HOSPADM

## 2025-03-02 RX ORDER — ACETAMINOPHEN 500 MG
1000 TABLET ORAL ONCE
Status: COMPLETED | OUTPATIENT
Start: 2025-03-02 | End: 2025-03-02

## 2025-03-02 RX ORDER — POTASSIUM CHLORIDE 1500 MG/1
40 TABLET, EXTENDED RELEASE ORAL PRN
Status: DISCONTINUED | OUTPATIENT
Start: 2025-03-02 | End: 2025-03-02

## 2025-03-02 RX ORDER — ONDANSETRON 2 MG/ML
4 INJECTION INTRAMUSCULAR; INTRAVENOUS EVERY 6 HOURS PRN
Status: DISCONTINUED | OUTPATIENT
Start: 2025-03-02 | End: 2025-03-04 | Stop reason: HOSPADM

## 2025-03-02 RX ORDER — ACETAMINOPHEN 650 MG/1
650 SUPPOSITORY RECTAL EVERY 6 HOURS PRN
Status: DISCONTINUED | OUTPATIENT
Start: 2025-03-02 | End: 2025-03-04 | Stop reason: HOSPADM

## 2025-03-02 RX ORDER — MAGNESIUM SULFATE IN WATER 40 MG/ML
2000 INJECTION, SOLUTION INTRAVENOUS PRN
Status: DISCONTINUED | OUTPATIENT
Start: 2025-03-02 | End: 2025-03-02

## 2025-03-02 RX ORDER — OSELTAMIVIR PHOSPHATE 75 MG/1
75 CAPSULE ORAL 2 TIMES DAILY
Status: DISCONTINUED | OUTPATIENT
Start: 2025-03-02 | End: 2025-03-02 | Stop reason: DRUGHIGH

## 2025-03-02 RX ADMIN — SODIUM CHLORIDE, PRESERVATIVE FREE 10 ML: 5 INJECTION INTRAVENOUS at 23:01

## 2025-03-02 RX ADMIN — SODIUM CHLORIDE: 9 INJECTION, SOLUTION INTRAVENOUS at 16:11

## 2025-03-02 RX ADMIN — LOPERAMIDE HYDROCHLORIDE 2 MG: 2 CAPSULE ORAL at 18:08

## 2025-03-02 RX ADMIN — ATENOLOL 25 MG: 25 TABLET ORAL at 10:25

## 2025-03-02 RX ADMIN — APIXABAN 2.5 MG: 2.5 TABLET, FILM COATED ORAL at 22:57

## 2025-03-02 RX ADMIN — ACETAMINOPHEN 1000 MG: 500 TABLET, FILM COATED ORAL at 01:08

## 2025-03-02 RX ADMIN — SODIUM CHLORIDE, PRESERVATIVE FREE 10 ML: 5 INJECTION INTRAVENOUS at 10:29

## 2025-03-02 RX ADMIN — POTASSIUM CHLORIDE 40 MEQ: 1500 TABLET, EXTENDED RELEASE ORAL at 10:26

## 2025-03-02 RX ADMIN — MAGNESIUM SULFATE HEPTAHYDRATE 2000 MG: 40 INJECTION, SOLUTION INTRAVENOUS at 10:28

## 2025-03-02 RX ADMIN — APIXABAN 2.5 MG: 2.5 TABLET, FILM COATED ORAL at 10:26

## 2025-03-02 RX ADMIN — POTASSIUM CHLORIDE 40 MEQ: 1500 TABLET, EXTENDED RELEASE ORAL at 18:08

## 2025-03-02 RX ADMIN — LOPERAMIDE HYDROCHLORIDE 2 MG: 2 CAPSULE ORAL at 13:28

## 2025-03-02 NOTE — H&P
* 127*   K 3.8 3.0*   CL 89* 94*   CO2 21* 23   BUN 14 13   CREATININE 0.5 0.5   CALCIUM 8.8 8.3*     Recent Labs     03/01/25  1856 03/02/25  0741   AST 29 34*   ALT 19 22   BILIDIR  --  0.2   BILITOT 0.7 0.6   ALKPHOS 110* 97     No results for input(s): \"INR\" in the last 72 hours.  No results for input(s): \"CKTOTAL\", \"TROPONINI\" in the last 72 hours.    Urinalysis:      Lab Results   Component Value Date/Time    NITRU NEGATIVE 03/01/2025 06:56 PM    WBCUA 0 TO 5 03/01/2025 06:56 PM    BACTERIA 1+ 09/21/2024 05:08 PM    RBCUA 51  03/01/2025 06:56 PM    BLOODU Negative 06/26/2021 05:05 PM    GLUCOSEU NEGATIVE 03/01/2025 06:56 PM       Radiology:           CT HEAD WO CONTRAST   Final Result   No acute intracranial abnormality.         XR CHEST PORTABLE   Final Result   Chronic coarse bilateral lung markings without interval consolidation.             ASSESSMENT:    Active Hospital Problems    Diagnosis Date Noted    Hyperlipidemia [E78.5] 09/07/2022     Priority: Medium    Influenza A [J10.1] 03/02/2025    Hyponatremia [E87.1] 03/01/2025         PLAN:  NEPHROLOGY   TAMIFLU  NORMAL SALINE      DVT Prophylaxis:  ELIQUIS  Diet: ADULT DIET; Regular; Low Fat/Low Chol/High Fiber/2 gm Na  Code Status: Full Code    PT/OT Eval Status: ORDERED    Dispo -  HOME    Electronically signed by Shahzad Cid DO on 3/2/2025 at 1:05 PM FACOI       Thank you Jamel Handy Jr., DO for the opportunity to be involved in this patient's care. If you have any questions or concerns please feel free to contact me at [phone]

## 2025-03-02 NOTE — CONSULTS
for: \"RUCREAT\"  Iron Saturation:  No components found for: \"PERCENTFE\"  TIBC:  No results found for: \"TIBC\"  FERRITIN:  No results found for: \"FERRITIN\"     Imaging:  CT HEAD WO CONTRAST   Final Result   No acute intracranial abnormality.         XR CHEST PORTABLE   Final Result   Chronic coarse bilateral lung markings without interval consolidation.             Assessment    -Hyponatremia hypovolemic according to the history and the improvement in sodium numbers with normal saline infusion.  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

## 2025-03-03 LAB
ANION GAP SERPL CALCULATED.3IONS-SCNC: 9 MMOL/L (ref 7–16)
BASOPHILS # BLD: 0.01 K/UL (ref 0–0.2)
BASOPHILS NFR BLD: 0 % (ref 0–2)
BUN SERPL-MCNC: 9 MG/DL (ref 6–23)
CALCIUM SERPL-MCNC: 8.2 MG/DL (ref 8.6–10.2)
CHLORIDE SERPL-SCNC: 98 MMOL/L (ref 98–107)
CO2 SERPL-SCNC: 23 MMOL/L (ref 22–29)
CREAT SERPL-MCNC: 0.4 MG/DL (ref 0.5–1)
EKG ATRIAL RATE: 54 BPM
EKG Q-T INTERVAL: 346 MS
EKG QRS DURATION: 88 MS
EKG QTC CALCULATION (BAZETT): 450 MS
EKG R AXIS: 48 DEGREES
EKG T AXIS: 27 DEGREES
EKG VENTRICULAR RATE: 102 BPM
EOSINOPHIL # BLD: 0.01 K/UL (ref 0.05–0.5)
EOSINOPHILS RELATIVE PERCENT: 0 % (ref 0–6)
ERYTHROCYTE [DISTWIDTH] IN BLOOD BY AUTOMATED COUNT: 12.7 % (ref 11.5–15)
GFR, ESTIMATED: >90 ML/MIN/1.73M2
GLUCOSE SERPL-MCNC: 83 MG/DL (ref 74–99)
HCT VFR BLD AUTO: 33.6 % (ref 34–48)
HGB BLD-MCNC: 11.2 G/DL (ref 11.5–15.5)
IMM GRANULOCYTES # BLD AUTO: <0.03 K/UL (ref 0–0.58)
IMM GRANULOCYTES NFR BLD: 0 % (ref 0–5)
LYMPHOCYTES NFR BLD: 0.93 K/UL (ref 1.5–4)
LYMPHOCYTES RELATIVE PERCENT: 33 % (ref 20–42)
MCH RBC QN AUTO: 31.5 PG (ref 26–35)
MCHC RBC AUTO-ENTMCNC: 33.3 G/DL (ref 32–34.5)
MCV RBC AUTO: 94.6 FL (ref 80–99.9)
MONOCYTES NFR BLD: 0.6 K/UL (ref 0.1–0.95)
MONOCYTES NFR BLD: 21 % (ref 2–12)
NEUTROPHILS NFR BLD: 44 % (ref 43–80)
NEUTS SEG NFR BLD: 1.24 K/UL (ref 1.8–7.3)
PLATELET # BLD AUTO: 123 K/UL (ref 130–450)
PMV BLD AUTO: 10.2 FL (ref 7–12)
POTASSIUM SERPL-SCNC: 3.6 MMOL/L (ref 3.5–5)
RBC # BLD AUTO: 3.55 M/UL (ref 3.5–5.5)
SODIUM SERPL-SCNC: 130 MMOL/L (ref 132–146)
WBC OTHER # BLD: 2.8 K/UL (ref 4.5–11.5)

## 2025-03-03 PROCEDURE — 2140000000 HC CCU INTERMEDIATE R&B

## 2025-03-03 PROCEDURE — 85025 COMPLETE CBC W/AUTO DIFF WBC: CPT

## 2025-03-03 PROCEDURE — 6370000000 HC RX 637 (ALT 250 FOR IP): Performed by: INTERNAL MEDICINE

## 2025-03-03 PROCEDURE — 6370000000 HC RX 637 (ALT 250 FOR IP): Performed by: NURSE PRACTITIONER

## 2025-03-03 PROCEDURE — 36415 COLL VENOUS BLD VENIPUNCTURE: CPT

## 2025-03-03 PROCEDURE — 2500000003 HC RX 250 WO HCPCS: Performed by: NURSE PRACTITIONER

## 2025-03-03 PROCEDURE — 2580000003 HC RX 258: Performed by: NURSE PRACTITIONER

## 2025-03-03 PROCEDURE — 93010 ELECTROCARDIOGRAM REPORT: CPT | Performed by: INTERNAL MEDICINE

## 2025-03-03 PROCEDURE — 80048 BASIC METABOLIC PNL TOTAL CA: CPT

## 2025-03-03 RX ORDER — POTASSIUM CHLORIDE 1500 MG/1
40 TABLET, EXTENDED RELEASE ORAL 2 TIMES DAILY WITH MEALS
Status: COMPLETED | OUTPATIENT
Start: 2025-03-03 | End: 2025-03-03

## 2025-03-03 RX ORDER — ATENOLOL 25 MG/1
25 TABLET ORAL NIGHTLY
Status: DISCONTINUED | OUTPATIENT
Start: 2025-03-04 | End: 2025-03-04 | Stop reason: HOSPADM

## 2025-03-03 RX ADMIN — APIXABAN 2.5 MG: 2.5 TABLET, FILM COATED ORAL at 20:10

## 2025-03-03 RX ADMIN — LOPERAMIDE HYDROCHLORIDE 2 MG: 2 CAPSULE ORAL at 08:57

## 2025-03-03 RX ADMIN — SODIUM CHLORIDE, PRESERVATIVE FREE 10 ML: 5 INJECTION INTRAVENOUS at 08:57

## 2025-03-03 RX ADMIN — LOPERAMIDE HYDROCHLORIDE 2 MG: 2 CAPSULE ORAL at 16:22

## 2025-03-03 RX ADMIN — OSELTAMIVIR PHOSPHATE 30 MG: 30 CAPSULE ORAL at 20:10

## 2025-03-03 RX ADMIN — OSELTAMIVIR PHOSPHATE 30 MG: 30 CAPSULE ORAL at 08:57

## 2025-03-03 RX ADMIN — ATENOLOL 25 MG: 25 TABLET ORAL at 08:57

## 2025-03-03 RX ADMIN — APIXABAN 2.5 MG: 2.5 TABLET, FILM COATED ORAL at 08:57

## 2025-03-03 RX ADMIN — POTASSIUM CHLORIDE 40 MEQ: 1500 TABLET, EXTENDED RELEASE ORAL at 16:22

## 2025-03-03 RX ADMIN — SODIUM CHLORIDE: 9 INJECTION, SOLUTION INTRAVENOUS at 11:42

## 2025-03-03 RX ADMIN — POTASSIUM CHLORIDE 40 MEQ: 1500 TABLET, EXTENDED RELEASE ORAL at 11:33

## 2025-03-03 NOTE — PLAN OF CARE
Problem: ABCDS Injury Assessment  Goal: Absence of physical injury  3/3/2025 0042 by Yasmeen Amanda RN  Outcome: Progressing  3/2/2025 1535 by Lc Lucero RN  Outcome: Progressing     Problem: Discharge Planning  Goal: Discharge to home or other facility with appropriate resources  3/3/2025 0042 by Yasmeen Amanda RN  Outcome: Progressing  3/2/2025 1535 by Lc Lucero RN  Outcome: Progressing     Problem: Pain  Goal: Verbalizes/displays adequate comfort level or baseline comfort level  3/3/2025 0042 by Yasmeen Amanda RN  Outcome: Progressing  3/2/2025 1535 by Lc Lucero RN  Outcome: Progressing     Problem: Safety - Adult  Goal: Free from fall injury  3/3/2025 0042 by Yasmeen Amanda RN  Outcome: Progressing  3/2/2025 1535 by Lc Lucero RN  Outcome: Progressing

## 2025-03-03 NOTE — CARE COORDINATION
Patient presented to the ED from home due to fatigue, hematuria, headache and vomiting; admitted for hyponatremia, dehydration and influenza A. Met with patient at bedside for transition of care planning. Patient reports she lives alone in a 1-story home, ambulates without a device, drives; has a cane at home. Uses [facility] pharmacy ([address] and PCP is Dr. Jamel Handy. Patient reports having supportive family and her children help out when needed. Patient plans to return home, is up in the room, no needs, declined home care services. Patient's son, Tk to transport home. Patient on room air, Tamiflu, sodium 130, PT/OT evals pending.     Case Management Assessment  Initial Evaluation    Date/Time of Evaluation: 3/3/2025 12:17 PM  Assessment Completed by: ALICJA Agrawal    If patient is discharged prior to next notation, then this note serves as note for discharge by case management.    Patient Name: Soraya Hammond                   YOB: 1933  Diagnosis: Dehydration [E86.0]  Hyponatremia [E87.1]  Influenza A [J10.1]                   Date / Time: 3/1/2025  9:49 PM    Patient Admission Status: Inpatient   Readmission Risk (Low < 19, Mod (19-27), High > 27): Readmission Risk Score: 11.4    Current PCP: Jamel Handy Jr., DO  PCP verified by ? Yes    Chart Reviewed: Yes      History Provided by: Patient  Patient Orientation: Alert and Oriented    Patient Cognition: Alert    Hospitalization in the last 30 days (Readmission):  No    If yes, Readmission Assessment in CM Navigator will be completed.    Advance Directives:      Code Status: Full Code   Patient's Primary Decision Maker is: Legal Next of Kin    Primary Decision Maker: Tk Hammond - Child - [phone]    Secondary Decision Maker: ManishShane - [phone]    Supplemental (Other) Decision Maker: Diana Hammond - Other - [phone]    Supplemental (Other) Decision Maker: Chelsey Hammond - [phone]    Discharge Planning:    Patient lives

## 2025-03-04 VITALS
BODY MASS INDEX: 22.31 KG/M2 | RESPIRATION RATE: 17 BRPM | HEIGHT: 59 IN | OXYGEN SATURATION: 97 % | TEMPERATURE: 97.9 F | SYSTOLIC BLOOD PRESSURE: 131 MMHG | HEART RATE: 94 BPM | WEIGHT: 110.67 LBS | DIASTOLIC BLOOD PRESSURE: 90 MMHG

## 2025-03-04 LAB
ANION GAP SERPL CALCULATED.3IONS-SCNC: 12 MMOL/L (ref 7–16)
BASOPHILS # BLD: 0.01 K/UL (ref 0–0.2)
BASOPHILS NFR BLD: 0 % (ref 0–2)
BUN SERPL-MCNC: 7 MG/DL (ref 6–23)
CALCIUM SERPL-MCNC: 8.4 MG/DL (ref 8.6–10.2)
CHLORIDE SERPL-SCNC: 96 MMOL/L (ref 98–107)
CHLORIDE UR-SCNC: 37 MMOL/L
CO2 SERPL-SCNC: 22 MMOL/L (ref 22–29)
CREAT SERPL-MCNC: 0.4 MG/DL (ref 0.5–1)
CREAT UR-MCNC: 19.9 MG/DL (ref 29–226)
EOSINOPHIL # BLD: 0.01 K/UL (ref 0.05–0.5)
EOSINOPHILS RELATIVE PERCENT: 0 % (ref 0–6)
ERYTHROCYTE [DISTWIDTH] IN BLOOD BY AUTOMATED COUNT: 12.6 % (ref 11.5–15)
GFR, ESTIMATED: >90 ML/MIN/1.73M2
GLUCOSE SERPL-MCNC: 91 MG/DL (ref 74–99)
HCT VFR BLD AUTO: 32.7 % (ref 34–48)
HGB BLD-MCNC: 11.4 G/DL (ref 11.5–15.5)
IMM GRANULOCYTES # BLD AUTO: <0.03 K/UL (ref 0–0.58)
IMM GRANULOCYTES NFR BLD: 0 % (ref 0–5)
LYMPHOCYTES NFR BLD: 0.83 K/UL (ref 1.5–4)
LYMPHOCYTES RELATIVE PERCENT: 24 % (ref 20–42)
MCH RBC QN AUTO: 32 PG (ref 26–35)
MCHC RBC AUTO-ENTMCNC: 34.9 G/DL (ref 32–34.5)
MCV RBC AUTO: 91.9 FL (ref 80–99.9)
MONOCYTES NFR BLD: 0.51 K/UL (ref 0.1–0.95)
MONOCYTES NFR BLD: 15 % (ref 2–12)
NEUTROPHILS NFR BLD: 60 % (ref 43–80)
NEUTS SEG NFR BLD: 2.08 K/UL (ref 1.8–7.3)
OSMOLALITY UR: 165 MOSM/KG (ref 300–900)
PLATELET, FLUORESCENCE: 130 K/UL (ref 130–450)
PMV BLD AUTO: 10.3 FL (ref 7–12)
POTASSIUM SERPL-SCNC: 4.1 MMOL/L (ref 3.5–5)
RBC # BLD AUTO: 3.56 M/UL (ref 3.5–5.5)
SODIUM SERPL-SCNC: 130 MMOL/L (ref 132–146)
SODIUM UR-SCNC: 38 MMOL/L
WBC OTHER # BLD: 3.5 K/UL (ref 4.5–11.5)

## 2025-03-04 PROCEDURE — 97530 THERAPEUTIC ACTIVITIES: CPT

## 2025-03-04 PROCEDURE — 2500000003 HC RX 250 WO HCPCS: Performed by: NURSE PRACTITIONER

## 2025-03-04 PROCEDURE — 97535 SELF CARE MNGMENT TRAINING: CPT

## 2025-03-04 PROCEDURE — 80048 BASIC METABOLIC PNL TOTAL CA: CPT

## 2025-03-04 PROCEDURE — 6370000000 HC RX 637 (ALT 250 FOR IP): Performed by: INTERNAL MEDICINE

## 2025-03-04 PROCEDURE — 97165 OT EVAL LOW COMPLEX 30 MIN: CPT

## 2025-03-04 PROCEDURE — 2580000003 HC RX 258: Performed by: NURSE PRACTITIONER

## 2025-03-04 PROCEDURE — 97161 PT EVAL LOW COMPLEX 20 MIN: CPT

## 2025-03-04 PROCEDURE — 36415 COLL VENOUS BLD VENIPUNCTURE: CPT

## 2025-03-04 PROCEDURE — 82570 ASSAY OF URINE CREATININE: CPT

## 2025-03-04 PROCEDURE — 83935 ASSAY OF URINE OSMOLALITY: CPT

## 2025-03-04 PROCEDURE — 85025 COMPLETE CBC W/AUTO DIFF WBC: CPT

## 2025-03-04 PROCEDURE — 82436 ASSAY OF URINE CHLORIDE: CPT

## 2025-03-04 PROCEDURE — 84300 ASSAY OF URINE SODIUM: CPT

## 2025-03-04 PROCEDURE — 6370000000 HC RX 637 (ALT 250 FOR IP): Performed by: NURSE PRACTITIONER

## 2025-03-04 RX ORDER — OSELTAMIVIR PHOSPHATE 30 MG/1
30 CAPSULE ORAL 2 TIMES DAILY
Qty: 6 CAPSULE | Refills: 0 | Status: SHIPPED | OUTPATIENT
Start: 2025-03-04 | End: 2025-03-07

## 2025-03-04 RX ADMIN — SODIUM CHLORIDE, PRESERVATIVE FREE 10 ML: 5 INJECTION INTRAVENOUS at 07:44

## 2025-03-04 RX ADMIN — APIXABAN 2.5 MG: 2.5 TABLET, FILM COATED ORAL at 07:44

## 2025-03-04 RX ADMIN — SODIUM CHLORIDE: 9 INJECTION, SOLUTION INTRAVENOUS at 04:30

## 2025-03-04 RX ADMIN — OSELTAMIVIR PHOSPHATE 30 MG: 30 CAPSULE ORAL at 07:45

## 2025-03-04 RX ADMIN — LOPERAMIDE HYDROCHLORIDE 2 MG: 2 CAPSULE ORAL at 07:44

## 2025-03-04 NOTE — DISCHARGE INSTRUCTIONS
Your information:  Name: Soraya Hammond  : 1933    Your instructions:    Hold Lasix on discharge and follow-up with nephrology outpatient for resumption  BMP ordered by nephro to be obtained prior to follow-up in the office  Follow-up with PCP within 1 week of discharge      What to do after you leave the hospital:    Recommended diet: low cholesterol/fat,  2g sodium diet    Recommended activity: activity as tolerated        The following personal items were collected during your admission and were returned to you:    Belongings  Dental Appliances: None  Vision - Corrective Lenses: Eyeglasses  Hearing Aid: Bilateral hearing aids  Clothing: Footwear, Shirt  Jewelry: Earrings, Watch  Body Piercings Removed: No  Electronic Devices: None  Weapons (Notify Protective Services/Security): None  Other Valuables: Purse  Home Medications: Other (Comment) (at home)  Valuables Given To: Family (Comment)  Provide Name(s) of Who Valuable(s) Were Given To: Ed Manish (youngest son)  Responsible person(s) in the waiting room: Hernan Hammond (youngest son)  Patient approves for provider to speak to responsible person post operatively: Yes    Information obtained by:  By signing below, I understand that if any problems occur once I leave the hospital I am to contact Dr. Handy or in the event of an emergency dial 911.  I understand and acknowledge receipt of the instructions indicated above.

## 2025-03-04 NOTE — CARE COORDINATION
Discharge order noted. Patient to discharge home today. Spoke with patient at bedside, she is still getting up independently without a device, reports no home going needs. Patient states her son will be picking her up when he gets off of work. Call made to patient's sonTk to provide update regarding discharge and check for any d/c needs, no answer; left message.    Electronically signed by ALICJA Agrawal on 3/4/2025 at 3:42 PM

## 2025-03-04 NOTE — PLAN OF CARE
Problem: ABCDS Injury Assessment  Goal: Absence of physical injury  Outcome: Progressing  Flowsheets (Taken 3/3/2025 1333 by Ade Mariee, RN)  Absence of Physical Injury: Implement safety measures based on patient assessment     Problem: Discharge Planning  Goal: Discharge to home or other facility with appropriate resources  Outcome: Progressing     Problem: Pain  Goal: Verbalizes/displays adequate comfort level or baseline comfort level  Outcome: Progressing     Problem: Safety - Adult  Goal: Free from fall injury  Outcome: Progressing  Flowsheets (Taken 3/3/2025 1333 by Ade Mariee, RN)  Free From Fall Injury: Instruct family/caregiver on patient safety     Problem: Infection - Adult  Goal: Absence of infection at discharge  Outcome: Progressing     Problem: Metabolic/Fluid and Electrolytes - Adult  Goal: Electrolytes maintained within normal limits  Outcome: Progressing  Goal: Hemodynamic stability and optimal renal function maintained  Outcome: Progressing  Goal: Glucose maintained within prescribed range  Outcome: Progressing     Problem: Chronic Conditions and Co-morbidities  Goal: Patient's chronic conditions and co-morbidity symptoms are monitored and maintained or improved  Outcome: Progressing     Problem: Neurosensory - Adult  Goal: Achieves stable or improved neurological status  Outcome: Progressing

## 2025-03-04 NOTE — DISCHARGE SUMMARY
masses  Extrem:  No clubbing, cyanosis, or edema    Disposition: home     Patient Condition at Discharge: Stable     Patient Instructions:      Medication List        START taking these medications      oseltamivir 30 MG capsule  Commonly known as: TAMIFLU  Take 1 capsule by mouth 2 times daily for 6 doses            CONTINUE taking these medications      atenolol 25 MG tablet  Commonly known as: TENORMIN  Take 1 tablet by mouth daily     calcium carbonate 500 MG Tabs tablet  Commonly known as: OSCAL     Eliquis 2.5 MG Tabs tablet  Generic drug: apixaban     loperamide 2 MG capsule  Commonly known as: IMODIUM     PRESERVISION AREDS 2 PO     sodium chloride 0.65 % nasal spray  Commonly known as: OCEAN, BABY AYR  1 spray by Nasal route as needed for Congestion            STOP taking these medications      LASIX PO     levETIRAcetam 500 MG tablet  Commonly known as: KEPPRA               Where to Get Your Medications        These medications were sent to [id number] - [place], [place] - [address] - P [phone] - F [phone]  [address], [place] [address]      Phone: [phone]   oseltamivir 30 MG capsule       Activity: activity as tolerated  Diet: regular diet    Pt has been advised to:    Follow-up with Jamel Handy Jr., DO in 1 week.  Follow-up with consultants as recommended by them      Signed:  MARTA Interiano CNP  3/4/2025  2:31 PM    Above note edited to reflect my thoughts     I personally provided care for the patient. Radiographs, labs and medication list were reviewed by me independently.  The case was discussed in detail and plans for care were established. Review of SOFI Interiano   , documentation was conducted and revisions were made as appropriate directly by me. I agree with the above documented exam, problem list, and plan of care.     Ivett Hurtado MD

## 2025-03-04 NOTE — PROGRESS NOTES
Hospitalist Progress Note      PCP: Jamel Handy Jr.,     Date of Admission: 3/1/2025        Hospital Course:  89 y.o. female presented with  WEAKNESS.  STATES SHE IS USUALLY ACTIVE, BUT HAS HAD NO ENERGY, AND SHE HAS BEEN COUGHING, NON PRODUCTIVE.  SHE STATES SHE HAS A SHORT BOWEL BC SHE HAD HER SMALL BOWEL REMOVED, AND TAKES IMMODIUM  TO PREVENT DIARRHEA . NO NV, NO FEVER OR CHILLS PER THE PATIENT         Subjective:  FEELING BETTER, WANTS TO GO HOME           Medications:  Reviewed    Infusion Medications    sodium chloride      sodium chloride 60 mL/hr at 03/03/25 1142     Scheduled Medications    potassium chloride  40 mEq Oral BID WC    atenolol  25 mg Oral Daily    apixaban  2.5 mg Oral BID    sodium chloride flush  5-40 mL IntraVENous 2 times per day    loperamide  2 mg Oral BID AC    oseltamivir  30 mg Oral BID     PRN Meds: sodium chloride flush, sodium chloride, ondansetron **OR** ondansetron, polyethylene glycol, acetaminophen **OR** acetaminophen, loperamide      Intake/Output Summary (Last 24 hours) at 3/3/2025 1251  Last data filed at 3/3/2025 0035  Gross per 24 hour   Intake 1062.77 ml   Output --   Net 1062.77 ml       Exam:    BP (!) 138/97   Pulse 90   Temp 98.6 °F (37 °C) (Axillary)   Resp 21   Ht 1.499 m (4' 11\")   Wt 49.9 kg (110 lb)   SpO2 99%   BMI 22.22 kg/m²       General appearance:  No apparent distress, appears stated age.  HEENT:  Normal cephalic,   Neck: Supple, with full range of motion. No jugular venous distention. Trachea midline.  Respiratory:  Normal respiratory effort. Clear to auscultation,   Cardiovascular:  RRR  Abdomen: Soft, non-tender, non-distended with normal bowel sounds.  Musculoskeletal:  No clubbing, cyanosis or edema bilaterally.    Skin: smooth and dry   Neurologic:   Cranial nerves: II-XII intact,   Psychiatric:  Alert and oriented x 3           Labs:   Recent Labs     03/01/25  1856 03/03/25  0623   WBC 3.9* 2.8*   HGB 11.7 11.2*   HCT 34.4 
[facility]  MD Walter Toscano, MD Charleen Kulkarni, MD Arlyn Rausch, CNP   Stephanie Epps, ANP  Janny Jerez, CNP  Progress Note    3/3/2025    SUBJECTIVE:   3/3: Seen while sitting up in bed, eating cookies. Reports that she is feeling much better. Admits to loose stools at home as she was out of her imodium. Vital signs are stable.     PROBLEM LIST:    Principal Problem:    Hyponatremia  Active Problems:    Hyperlipidemia    Influenza A  Resolved Problems:    * No resolved hospital problems. *         DIET:    ADULT DIET; Regular; Low Fat/Low Chol/High Fiber/2 gm Na     MEDS (scheduled):    potassium chloride  40 mEq Oral BID WC    atenolol  25 mg Oral Daily    apixaban  2.5 mg Oral BID    sodium chloride flush  5-40 mL IntraVENous 2 times per day    loperamide  2 mg Oral BID AC    oseltamivir  30 mg Oral BID       MEDS (infusions):   sodium chloride      sodium chloride 60 mL/hr at 03/03/25 1142       MEDS (prn):  sodium chloride flush, sodium chloride, ondansetron **OR** ondansetron, polyethylene glycol, acetaminophen **OR** acetaminophen, loperamide    PHYSICAL EXAM:     Patient Vitals for the past 24 hrs:   BP Temp Temp src Pulse Resp SpO2 Weight   03/03/25 1130 (!) 138/97 98.6 °F (37 °C) Axillary 90 21 99 % --   03/03/25 0830 (!) 121/92 98.4 °F (36.9 °C) Temporal 98 20 98 % --   03/03/25 0600 -- -- -- -- -- -- 49.9 kg (110 lb)   03/03/25 0345 135/89 97 °F (36.1 °C) Tympanic 92 18 99 % --   03/02/25 2245 138/89 97.4 °F (36.3 °C) Temporal 90 22 99 % --   03/02/25 1922 133/85 97.4 °F (36.3 °C) Temporal 94 26 98 % --   03/02/25 1534 128/73 98.4 °F (36.9 °C) Oral 89 18 95 % --   @      Intake/Output Summary (Last 24 hours) at 3/3/2025 1533  Last data filed at 3/3/2025 0035  Gross per 24 hour   Intake 1062.77 ml   Output --   Net 1062.77 ml         Wt Readings from Last 3 Encounters:   03/03/25 49.9 kg (110 lb)   12/26/24 49.9 kg (110 lb)   10/01/24 51.9 kg (114 lb 6.4 oz) 
[facility]  MD Walter Toscano, MD Charleen Kulkarni, MD Arlyn Rausch, CNP   Stephanie Epps, SRINI Jerez, CNP  Progress Note    3/4/2025    SUBJECTIVE:   3/3: Seen while sitting up in bed, eating cookies. Reports that she is feeling much better. Admits to loose stools at home as she was out of her imodium. Vital signs are stable.     3/4: Seen while sitting up in the chair. Anxious to go home. Denies dyspnea, chest pain, or palpitations. Diarrhea has slowed down. Her intake is good.     PROBLEM LIST:    Principal Problem:    Hyponatremia  Active Problems:    Hyperlipidemia    Influenza A  Resolved Problems:    * No resolved hospital problems. *         DIET:    ADULT DIET; Regular; Low Fat/Low Chol/High Fiber/2 gm Na     MEDS (scheduled):    atenolol  25 mg Oral Nightly    apixaban  2.5 mg Oral BID    sodium chloride flush  5-40 mL IntraVENous 2 times per day    loperamide  2 mg Oral BID AC    oseltamivir  30 mg Oral BID       MEDS (infusions):   sodium chloride      sodium chloride 60 mL/hr at 03/04/25 0554       MEDS (prn):  sodium chloride flush, sodium chloride, ondansetron **OR** ondansetron, polyethylene glycol, acetaminophen **OR** acetaminophen, loperamide    PHYSICAL EXAM:     Patient Vitals for the past 24 hrs:   BP Temp Temp src Pulse Resp SpO2 Weight   03/04/25 1153 (!) 131/90 97.9 °F (36.6 °C) Oral 94 17 97 % --   03/04/25 0730 134/85 97.8 °F (36.6 °C) Oral 90 17 99 % --   03/04/25 0309 (!) 139/99 98.1 °F (36.7 °C) Oral 91 19 96 % --   03/04/25 0200 -- -- -- -- -- -- 50.2 kg (110 lb 10.7 oz)   03/03/25 2345 (!) 133/91 98.1 °F (36.7 °C) Oral 98 20 97 % --   03/03/25 1944 (!) 141/80 98.1 °F (36.7 °C) Oral 91 19 100 % --   03/03/25 1615 137/83 98.1 °F (36.7 °C) Oral 98 20 98 % --   @      Intake/Output Summary (Last 24 hours) at 3/4/2025 1420  Last data filed at 3/4/2025 0554  Gross per 24 hour   Intake 1972.34 ml   Output 1450 ml   Net 522.34 ml         Wt 
Called Mami Najera in regards to patient requesting her atenolol being given at night instead of morning. Since she got her morning dose, NP stated that she will adjust the order for tomorrow evening.   
Database done.  
Renal Dose Adjustment Policy (Generic)     This patient is on medication that requires renal, weight, and/or indication dose adjustment.      Date Body Weight IBW  Adjusted BW SCr  CrCl Dialysis status   3/2/2025 49.9 kg (110 lb) Ideal body weight: 45.5 kg (100 lb 6.6 oz)  Adjusted ideal body weight: 47.3 kg (104 lb 3.9 oz) Serum creatinine: 0.5 mg/dL 03/01/25 1856  Estimated creatinine clearance: 53 mL/min N/a       Pharmacy has dose-adjusted the following medication(s):    Date Previous Order Adjusted Order   3/2/2025 Tamiflu 75mg BID Tamiflu 75mg once then Tamiflu 30mg BID       These changes were made per protocol according to the [facility]   Automatic Renal Dose Adjustment Policy.     *Please note this dose may need readjusted if patient's condition changes.    Please contact pharmacy with any questions regarding these changes.    Jabari Lao RPH  3/2/2025  1:10 AM    
Updates provided to daughter Diana  
abnormalities  Edema:  none noted    Vitals:  BP seated EOB: 133/96, HR: 98bpm  SPO2 on room air: 97%      Patient education  Pt educated on role of PT, safety during mobility    Patient response to education:   Pt verbalized understanding Pt demonstrated skill Pt requires further education in this area   yes yes yes     ASSESSMENT:    Conditions Requiring Skilled Therapeutic Intervention:    [x]Decreased strength     [x]Decreased ROM  [x]Decreased functional mobility  [x]Decreased balance   [x]Decreased endurance   []Decreased posture  []Decreased sensation  []Decreased coordination   []Decreased vision  []Decreased safety awareness   []Increased pain       Comments:  pt standing in room upon entry and agreeable to PT evaluation. Pt returned to seated for subjective questioning at EOB. After rest, pt able to stand and ambulate in room with no AD and light balance assist d/t unsteadiness with frequent scissor stepping. Pt occasionally reaching for environment for external support. After seated rest, ambulation to and from restroom in room with similar assist. Pt positioned in chair at end of session.   All needs met and call light in reach. All lines remained intact.     Patient to benefit from home skilled PT at [facility] to improve functional mobility and decrease fall risk.     Treatment:  Patient practiced and was instructed in the following treatment:    Transfer Training: Verbal and tactile cueing provided for sequencing and safety during mobility.   Gait Training: Ambulation with no AD and verbal cues for proper technique and safety. Manual assist provided for completion of task     Pt's/ family goals   1. Return home    Prognosis is good for reaching above PT goals.    Patient and or family understand(s) diagnosis, prognosis, and plan of care.  yes    PHYSICAL THERAPY PLAN OF CARE:    PT POC is established based on physician order and patient diagnosis     Referring provider/PT Order:    03/04/25 0900  PT rebeccaal and 
tasks at sink while cuing on modified techniques, posture, safety and energy conservation techniques. Skilled monitoring of HR, O2 sats and pts response to treatment.        Rehab Potential:  Good for established goals     Patient / Family Goal: Regain [place]     Patient and/or family were instructed on functional diagnosis, prognosis/goals and OT plan of care. Demonstrated fair understanding.     Eval Complexity: Low    Time In: 945  Time Out: 1010  Total Treatment Time: 10 minutes    Min Units   OT Eval Low 97165  x  1   OT Eval Medium 65649      OT Eval High 22892      OT Re-Eval 85331       Therapeutic Ex 01436       Therapeutic Activities 99137  2     ADL/Self Care 33902  8  1   Orthotic Management 33259       Manual 14562     Neuro Re-Ed 42599       Non-Billable Time          Evaluation Time additionally includes thorough review of current medical information, gathering information on past medical history/social history and prior level of function, interpretation of standardized testing/informal observation of tasks, assessment of data and development of plan of care and goals.          Walter Banegas OTR/L #[id number]

## 2025-03-26 ENCOUNTER — APPOINTMENT (OUTPATIENT)
Dept: GENERAL RADIOLOGY | Age: 89
End: 2025-03-26
Payer: MEDICARE

## 2025-03-26 ENCOUNTER — HOSPITAL ENCOUNTER (EMERGENCY)
Age: 89
Discharge: HOME | End: 2025-03-27
Attending: STUDENT IN AN ORGANIZED HEALTH CARE EDUCATION/TRAINING PROGRAM
Payer: MEDICARE

## 2025-03-26 DIAGNOSIS — R07.9 CHEST PAIN, UNSPECIFIED TYPE: Primary | ICD-10-CM

## 2025-03-26 LAB
ALBUMIN SERPL-MCNC: 4.6 G/DL (ref 3.5–5.2)
ALP SERPL-CCNC: 121 U/L (ref 35–104)
ALT SERPL-CCNC: 20 U/L (ref 0–32)
ANION GAP SERPL CALCULATED.3IONS-SCNC: 14 MMOL/L (ref 7–16)
AST SERPL-CCNC: 24 U/L (ref 0–31)
BASOPHILS # BLD: 0.02 K/UL (ref 0–0.2)
BASOPHILS NFR BLD: 0 % (ref 0–2)
BILIRUB SERPL-MCNC: 0.6 MG/DL (ref 0–1.2)
BUN SERPL-MCNC: 8 MG/DL (ref 6–23)
CALCIUM SERPL-MCNC: 9.8 MG/DL (ref 8.6–10.2)
CHLORIDE SERPL-SCNC: 100 MMOL/L (ref 98–107)
CO2 SERPL-SCNC: 22 MMOL/L (ref 22–29)
CREAT SERPL-MCNC: 0.5 MG/DL (ref 0.5–1)
D-DIMER QUANTITATIVE: <200 NG/ML DDU (ref 0–230)
EOSINOPHIL # BLD: 0.14 K/UL (ref 0.05–0.5)
EOSINOPHILS RELATIVE PERCENT: 3 % (ref 0–6)
ERYTHROCYTE [DISTWIDTH] IN BLOOD BY AUTOMATED COUNT: 12.9 % (ref 11.5–15)
FLUAV RNA RESP QL NAA+PROBE: NOT DETECTED
FLUBV RNA RESP QL NAA+PROBE: NOT DETECTED
GFR, ESTIMATED: 88 ML/MIN/1.73M2
GLUCOSE SERPL-MCNC: 105 MG/DL (ref 74–99)
HCT VFR BLD AUTO: 36.8 % (ref 34–48)
HGB BLD-MCNC: 12.4 G/DL (ref 11.5–15.5)
IMM GRANULOCYTES # BLD AUTO: <0.03 K/UL (ref 0–0.58)
IMM GRANULOCYTES NFR BLD: 0 % (ref 0–5)
LYMPHOCYTES NFR BLD: 1.03 K/UL (ref 1.5–4)
LYMPHOCYTES RELATIVE PERCENT: 19 % (ref 20–42)
MAGNESIUM SERPL-MCNC: 1.7 MG/DL (ref 1.6–2.6)
MCH RBC QN AUTO: 31.6 PG (ref 26–35)
MCHC RBC AUTO-ENTMCNC: 33.7 G/DL (ref 32–34.5)
MCV RBC AUTO: 93.6 FL (ref 80–99.9)
MONOCYTES NFR BLD: 0.69 K/UL (ref 0.1–0.95)
MONOCYTES NFR BLD: 13 % (ref 2–12)
NEUTROPHILS NFR BLD: 66 % (ref 43–80)
NEUTS SEG NFR BLD: 3.63 K/UL (ref 1.8–7.3)
PLATELET # BLD AUTO: 192 K/UL (ref 130–450)
PMV BLD AUTO: 9.8 FL (ref 7–12)
POTASSIUM SERPL-SCNC: 3.9 MMOL/L (ref 3.5–5)
PROT SERPL-MCNC: 7.2 G/DL (ref 6.4–8.3)
RBC # BLD AUTO: 3.93 M/UL (ref 3.5–5.5)
SARS-COV-2 RNA RESP QL NAA+PROBE: NOT DETECTED
SODIUM SERPL-SCNC: 136 MMOL/L (ref 132–146)
SOURCE: NORMAL
SPECIMEN DESCRIPTION: NORMAL
TROPONIN I SERPL HS-MCNC: 22 NG/L (ref 0–9)
WBC OTHER # BLD: 5.5 K/UL (ref 4.5–11.5)

## 2025-03-26 PROCEDURE — 93005 ELECTROCARDIOGRAM TRACING: CPT

## 2025-03-26 PROCEDURE — 80053 COMPREHEN METABOLIC PANEL: CPT

## 2025-03-26 PROCEDURE — 99285 EMERGENCY DEPT VISIT HI MDM: CPT

## 2025-03-26 PROCEDURE — 71045 X-RAY EXAM CHEST 1 VIEW: CPT

## 2025-03-26 PROCEDURE — 87636 SARSCOV2 & INF A&B AMP PRB: CPT

## 2025-03-26 PROCEDURE — 84484 ASSAY OF TROPONIN QUANT: CPT

## 2025-03-26 PROCEDURE — 85379 FIBRIN DEGRADATION QUANT: CPT

## 2025-03-26 PROCEDURE — 85025 COMPLETE CBC W/AUTO DIFF WBC: CPT

## 2025-03-26 PROCEDURE — 83735 ASSAY OF MAGNESIUM: CPT

## 2025-03-26 RX ORDER — 0.9 % SODIUM CHLORIDE 0.9 %
1000 INTRAVENOUS SOLUTION INTRAVENOUS ONCE
Status: COMPLETED | OUTPATIENT
Start: 2025-03-26 | End: 2025-03-27

## 2025-03-26 ASSESSMENT — PAIN SCALES - GENERAL: PAINLEVEL_OUTOF10: 4

## 2025-03-26 ASSESSMENT — PAIN - FUNCTIONAL ASSESSMENT: PAIN_FUNCTIONAL_ASSESSMENT: 0-10

## 2025-03-27 VITALS
HEART RATE: 88 BPM | WEIGHT: 110 LBS | OXYGEN SATURATION: 94 % | SYSTOLIC BLOOD PRESSURE: 145 MMHG | TEMPERATURE: 97.5 F | BODY MASS INDEX: 22.22 KG/M2 | RESPIRATION RATE: 17 BRPM | DIASTOLIC BLOOD PRESSURE: 88 MMHG

## 2025-03-27 LAB — TROPONIN I SERPL HS-MCNC: 22 NG/L (ref 0–9)

## 2025-03-27 PROCEDURE — 2580000003 HC RX 258

## 2025-03-27 PROCEDURE — 84484 ASSAY OF TROPONIN QUANT: CPT

## 2025-03-27 RX ADMIN — SODIUM CHLORIDE 1000 ML: 0.9 INJECTION, SOLUTION INTRAVENOUS at 01:12

## 2025-03-27 NOTE — ED PROVIDER NOTES
Pulmonary effort is normal. No respiratory distress.      Breath sounds: No stridor.   Abdominal:      General: Abdomen is flat. There is no distension.      Palpations: Abdomen is soft. There is no mass.      Tenderness: There is no abdominal tenderness.      Hernia: No hernia is present.   Musculoskeletal:         General: No swelling, tenderness, deformity or signs of injury. Normal range of motion.      Cervical back: Normal range of motion and neck supple. No rigidity or tenderness.   Skin:     General: Skin is warm.      Capillary Refill: Capillary refill takes less than 2 seconds.      Coloration: Skin is not jaundiced or pale.      Findings: No bruising or erythema.   Neurological:      General: No focal deficit present.      Mental Status: She is alert and oriented to person, place, and time.      Cranial Nerves: No cranial nerve deficit.             -------------------------------------------------- RESULTS -------------------------------------------------  I have personally reviewed all laboratory and imaging results for this patient. Results are listed below.     LABS:  Results for orders placed or performed during the hospital encounter of 03/26/25   COVID-19 & Influenza Combo    Specimen: Nasopharyngeal Swab   Result Value Ref Range    Specimen Description .NASOPHARYNGEAL SWAB     Source .NASOPHARYNGEAL SWAB     SARS-CoV-2 RNA, RT PCR Not Detected Not Detected    Influenza A Not Detected Not Detected    Influenza B Not Detected Not Detected   CBC with Auto Differential   Result Value Ref Range    WBC 5.5 4.5 - 11.5 k/uL    RBC 3.93 3.50 - 5.50 m/uL    Hemoglobin 12.4 11.5 - 15.5 g/dL    Hematocrit 36.8 34.0 - 48.0 %    MCV 93.6 80.0 - 99.9 fL    MCH 31.6 26.0 - 35.0 pg    MCHC 33.7 32.0 - 34.5 g/dL    RDW 12.9 11.5 - 15.0 %    Platelets 192 130 - 450 k/uL    MPV 9.8 7.0 - 12.0 fL    Neutrophils % 66 43.0 - 80.0 %    Lymphocytes % 19 (L) 20.0 - 42.0 %    Monocytes % 13 (H) 2.0 - 12.0 %    Eosinophils %  has a follow-up appointment with PCP, and cardiologist.  All questions and concerns were addressed with patient.  Patient instructed to follow-up with the PCP.    DDx includes but is not limited to ACS, pulmonary embolism, viral illness, pneumonia, costochondritis    On reevaluation patient had significant symptomatic relief. Patient would like to go home.     Pt will be d/c and will follow up with her PCP. She is educated on signs and symptoms that require emergent evaluation. Pt is advised to return to the ED if her symptoms change or worsen. If her pain persists, pt may need further evaluation. Pt is agreeable to plan and all questions have been answered at this time.      1. Chest pain, unspecified type      None    Please see ED course for more details:    ED Course as of 03/27/25 0353   Wed Mar 26, 2025   2109 EKG read interpreted by me.  Rate 101 bpm.  Normal axis.  Irregularly irregular rhythm.  No ST elevations or depressions.  Atrial fibrillation.  Compared to prior EKG on March 1, 2025 no significant change [JM]   u Mar 27, 2025   0350 The patient was reevaluated. Repeat troponin of 22.  Patient states that she is not having any chest pain at this moment.  Patient states that she has a follow-up appointment with her cardiologist, and primary care doctor.  Patient had a reassuring workup in the ED and was stable for discharge.  Strict return precautions were given.  Patient has full capacity to make her medical decisions.  Patient agrees to all plans recommendation.  All questions and concerns were addressed. [HR]      ED Course User Index  [HR] Cristhian Fine MD  [JM] Juice De Souza MD       Medications   sodium chloride 0.9 % bolus 1,000 mL (1,000 mLs IntraVENous New Bag 3/27/25 0112)       New Prescriptions    No medications on file         Counseling:   The emergency provider has spoken with the patient and discussed today’s results, in addition to providing specific details for

## 2025-03-28 LAB
EKG ATRIAL RATE: 93 BPM
EKG Q-T INTERVAL: 362 MS
EKG QRS DURATION: 78 MS
EKG QTC CALCULATION (BAZETT): 469 MS
EKG R AXIS: 22 DEGREES
EKG T AXIS: 37 DEGREES
EKG VENTRICULAR RATE: 101 BPM

## 2025-03-28 PROCEDURE — 93010 ELECTROCARDIOGRAM REPORT: CPT | Performed by: INTERNAL MEDICINE

## 2025-04-01 PROBLEM — J10.1 INFLUENZA A: Status: RESOLVED | Noted: 2025-03-02 | Resolved: 2025-04-01

## 2025-07-20 ENCOUNTER — HOSPITAL ENCOUNTER (EMERGENCY)
Age: 89
Discharge: HOME | End: 2025-07-20
Attending: EMERGENCY MEDICINE
Payer: MEDICARE

## 2025-07-20 ENCOUNTER — APPOINTMENT (OUTPATIENT)
Dept: CT IMAGING | Age: 89
End: 2025-07-20
Payer: MEDICARE

## 2025-07-20 ENCOUNTER — APPOINTMENT (OUTPATIENT)
Dept: GENERAL RADIOLOGY | Age: 89
End: 2025-07-20
Payer: MEDICARE

## 2025-07-20 VITALS
SYSTOLIC BLOOD PRESSURE: 128 MMHG | OXYGEN SATURATION: 94 % | TEMPERATURE: 98.1 F | HEART RATE: 72 BPM | RESPIRATION RATE: 16 BRPM | DIASTOLIC BLOOD PRESSURE: 78 MMHG
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
## 2025-07-20 DIAGNOSIS — S22.41XA CLOSED FRACTURE OF MULTIPLE RIBS OF RIGHT SIDE, INITIAL ENCOUNTER: ICD-10-CM

## 2025-07-20 DIAGNOSIS — S09.90XA INJURY OF HEAD, INITIAL ENCOUNTER: ICD-10-CM

## 2025-07-20 PROCEDURE — 73030 X-RAY EXAM OF SHOULDER: CPT

## 2025-07-20 PROCEDURE — 72125 CT NECK SPINE W/O DYE: CPT

## 2025-07-20 PROCEDURE — 70450 CT HEAD/BRAIN W/O DYE: CPT

## 2025-07-20 PROCEDURE — 71101 X-RAY EXAM UNILAT RIBS/CHEST: CPT

## 2025-07-20 PROCEDURE — 99284 EMERGENCY DEPT VISIT MOD MDM: CPT

## 2025-07-20 RX ORDER — HYDROCODONE BITARTRATE AND ACETAMINOPHEN 5; 325 MG/1; MG/1
1 TABLET ORAL EVERY 6 HOURS PRN
Qty: 12 TABLET | Refills: 0 | Status: SHIPPED | OUTPATIENT
Start: 2025-07-20 | End: 2025-07-23

## 2025-07-20 RX ORDER — HYDROCODONE BITARTRATE AND ACETAMINOPHEN 5; 325 MG/1; MG/1
1 TABLET ORAL ONCE
Status: DISCONTINUED | OUTPATIENT
Start: 2025-07-20 | End: 2025-07-20 | Stop reason: HOSPADM

## 2025-07-20 NOTE — PROGRESS NOTES
Patient hearing aides removed, placed back in ears after scan. Patient earrings removed, placed in envelope sent back to ER with patient following the scans.

## 2025-07-20 NOTE — ED PROVIDER NOTES
HPI:  7/20/25,   Time: 12:48 PM EDT       Soraya Hammond is a 89 y.o. female presenting to the ED for fall/rt rib pain/hit head, beginning 30  min ago.  The complaint has been persistent, mild in severity, and worsened by deep breath.  Brought in by EMS.  Mechanical fall at [facility].  Lost balance.  Landed on right side.  Did hit head but no LOC.  Is on Eliquis.  Complains of right rib pain with deep inspiration.  No other extremity pain.    Review of Systems:   Pertinent positives and negatives are stated within HPI, all other systems reviewed and are negative.          --------------------------------------------- PAST HISTORY ---------------------------------------------  Past Medical History:  has a past medical history of Carpal tunnel syndrome, Cataract, Cobalamin deficiency, Contact dermatitis, Contusion, Hematoma, Hyperlipidemia, Hypomagnesemia, Perforation of intestine (HCC), Short bowel syndrome, Shoulder pain, Upper respiratory infection, Urinary tract infectious disease, Vertigo, and Vitamin D deficiency.    Past Surgical History:  has a past surgical history that includes Hysterectomy; Cholecystectomy; and colectomy (N/A, 6/27/2021).    Social History:  reports that she has never smoked. She has never used smokeless tobacco. She reports that she does not drink alcohol and does not use drugs.    Family History: family history includes Heart Disease in her father; Stroke in her mother.     The patient’s home medications have been reviewed.    Allergies: Nut [peanut-containing drug products] and Statins        ---------------------------------------------------PHYSICAL EXAM--------------------------------------    Constitutional/General: Alert and oriented x3, well appearing, non toxic in NAD  Head: Normocephalic and atraumatic  Eyes: PERRL, EOMI, conjunctive normal, sclera non icteric  Mouth: Oropharynx clear, handling secretions, no trismus, no asymmetry of the posterior oropharynx or uvular edema  Neck:

## 2025-07-20 NOTE — ED NOTES
Attempted to give pt medicine, pt stated \"I don't want pain meds right now because pain isn't that bad that I need medicine.\"

## (undated) DEVICE — GARMENT,MEDLINE,DVT,INT,CALF,MED, GEN2: Brand: MEDLINE

## (undated) DEVICE — [HIGH FLOW INSUFFLATOR,  DO NOT USE IF PACKAGE IS DAMAGED,  KEEP DRY,  KEEP AWAY FROM SUNLIGHT,  PROTECT FROM HEAT AND RADIOACTIVE SOURCES.]: Brand: PNEUMOSURE

## (undated) DEVICE — SHEET,DRAPE,40X58,STERILE: Brand: MEDLINE

## (undated) DEVICE — Device

## (undated) DEVICE — PACK SURG LAP CHOLE CUSTOM

## (undated) DEVICE — SEALER ENDOSCP NANO COAT OPN DIV CRV L JAW LIGASURE IMPACT

## (undated) DEVICE — ELECTRODE PT RET AD L9FT HI MOIST COND ADH HYDRGEL CORDED

## (undated) DEVICE — CAMERA STRYKER 1488 HD GEN

## (undated) DEVICE — PMI PTFE COATED LAPAROSCOPIC WIRE L-HOOK 44 CM: Brand: PMI

## (undated) DEVICE — TOTAL TRAY, 16FR 10ML SIL FOLEY, URN: Brand: MEDLINE

## (undated) DEVICE — YANKAUER,BULB TIP,W/O VENT,RIGID,STERILE: Brand: MEDLINE

## (undated) DEVICE — TOWEL,OR,DSP,ST,BLUE,STD,6/PK,12PK/CS: Brand: MEDLINE

## (undated) DEVICE — TUBING, SUCTION, 1/4" X 10', STRAIGHT: Brand: MEDLINE

## (undated) DEVICE — GLOVE ORANGE PI 7 1/2   MSG9075

## (undated) DEVICE — DOUBLE BASIN SET: Brand: MEDLINE INDUSTRIES, INC.

## (undated) DEVICE — PUMP SUC IRR TBNG L10FT W/ HNDPC ASSEMB STRYKEFLOW 2

## (undated) DEVICE — TROCAR: Brand: KII SLEEVE

## (undated) DEVICE — SET MAJOR INSTR HOUSE

## (undated) DEVICE — TRAY PROCED CUSTOM GASTROINTESTINAL

## (undated) DEVICE — RELOAD STPL L75MM OPN H3.8MM CLS 1.5MM WIRE DIA0.2MM REG

## (undated) DEVICE — INSUFFLATION NEEDLE TO ESTABLISH PNEUMOPERITONEUM.: Brand: INSUFFLATION NEEDLE

## (undated) DEVICE — SYRINGE MED 10ML TRNSLUC BRL PLUNG BLK MRK POLYPR CTRL

## (undated) DEVICE — MARKER,SKIN,WI/RULER AND LABELS: Brand: MEDLINE

## (undated) DEVICE — KIT CVC AD 7FR L20CM POLYUR BLU FLEXTIP ANTIMIC MULTILUMEN

## (undated) DEVICE — NEEDLE HYPO 25GA L1.5IN BLU POLYPR HUB S STL REG BVL STR

## (undated) DEVICE — PAD GZ BORD ST 4INX10IN 2X8IN

## (undated) DEVICE — COVER,LIGHT HANDLE,FLX,1/PK: Brand: MEDLINE INDUSTRIES, INC.

## (undated) DEVICE — SPONGE LAP W18XL18IN WHT COT 4 PLY FLD STRUNG RADPQ DISP ST

## (undated) DEVICE — BLADE ES ELASTOMERIC COAT INSUL DURABLE BEND UPTO 90DEG

## (undated) DEVICE — STAPLER INT L75MM CUT LN L73MM STPL LN L77MM BLU B FRM 8

## (undated) DEVICE — BASIC SINGLE BASIN 1-LF: Brand: MEDLINE INDUSTRIES, INC.

## (undated) DEVICE — SYRINGE IRRIG 60ML SFT PLIABLE BLB EZ TO GRP 1 HND USE W/

## (undated) DEVICE — TROCAR: Brand: KII FIOS FIRST ENTRY

## (undated) DEVICE — APPLICATOR MEDICATED 26 CC SOLUTION HI LT ORNG CHLORAPREP

## (undated) DEVICE — Z INACTIVE USE 2660664 SOLUTION IRRIG 3000ML 0.9% SOD CHL USP UROMATIC PLAS CONT

## (undated) DEVICE — PLUMEPORT LAPAROSCOPIC SMOKE FILTRATION DEVICE: Brand: PLUMEPORT ACTIV

## (undated) DEVICE — GOWN,SIRUS,NONRNF,SETINSLV,XL,20/CS: Brand: MEDLINE